# Patient Record
Sex: MALE | Race: WHITE | NOT HISPANIC OR LATINO | Employment: OTHER | URBAN - NONMETROPOLITAN AREA
[De-identification: names, ages, dates, MRNs, and addresses within clinical notes are randomized per-mention and may not be internally consistent; named-entity substitution may affect disease eponyms.]

---

## 2018-06-18 ENCOUNTER — TRANSCRIBE ORDERS (OUTPATIENT)
Dept: ADMINISTRATIVE | Facility: HOSPITAL | Age: 63
End: 2018-06-18

## 2018-06-18 ENCOUNTER — HOSPITAL ENCOUNTER (OUTPATIENT)
Dept: CT IMAGING | Facility: HOSPITAL | Age: 63
Discharge: HOME | End: 2018-06-18
Admitting: FAMILY MEDICINE

## 2018-06-18 DIAGNOSIS — N20.0 KIDNEY STONES: Primary | ICD-10-CM

## 2018-06-18 DIAGNOSIS — N20.0 KIDNEY STONES: ICD-10-CM

## 2018-06-18 PROCEDURE — 74176 CT ABD & PELVIS W/O CONTRAST: CPT

## 2023-05-02 ENCOUNTER — OFFICE VISIT (OUTPATIENT)
Dept: UROLOGY | Facility: CLINIC | Age: 68
End: 2023-05-02
Payer: MEDICARE

## 2023-05-02 VITALS
BODY MASS INDEX: 42.66 KG/M2 | SYSTOLIC BLOOD PRESSURE: 140 MMHG | HEIGHT: 72 IN | WEIGHT: 315 LBS | DIASTOLIC BLOOD PRESSURE: 86 MMHG

## 2023-05-02 DIAGNOSIS — R35.0 BENIGN PROSTATIC HYPERPLASIA WITH URINARY FREQUENCY: Primary | ICD-10-CM

## 2023-05-02 DIAGNOSIS — N40.1 BENIGN PROSTATIC HYPERPLASIA WITH URINARY FREQUENCY: Primary | ICD-10-CM

## 2023-05-02 LAB
BILIRUB BLD-MCNC: NEGATIVE MG/DL
CLARITY, POC: CLEAR
COLOR UR: ABNORMAL
EXPIRATION DATE: ABNORMAL
GLUCOSE UR STRIP-MCNC: ABNORMAL MG/DL
KETONES UR QL: NEGATIVE
LEUKOCYTE EST, POC: NEGATIVE
Lab: ABNORMAL
NITRITE UR-MCNC: NEGATIVE MG/ML
PH UR: 5.5 [PH] (ref 5–8)
PROT UR STRIP-MCNC: ABNORMAL MG/DL
RBC # UR STRIP: NEGATIVE /UL
SP GR UR: 1.02 (ref 1–1.03)
UROBILINOGEN UR QL: NORMAL

## 2023-05-02 RX ORDER — OFLOXACIN 3 MG/ML
SOLUTION/ DROPS OPHTHALMIC
COMMUNITY
Start: 2023-02-08

## 2023-05-02 RX ORDER — LISINOPRIL 20 MG/1
20 TABLET ORAL EVERY MORNING
COMMUNITY
Start: 2023-04-08

## 2023-05-02 RX ORDER — TAMSULOSIN HYDROCHLORIDE 0.4 MG/1
1 CAPSULE ORAL EVERY 12 HOURS SCHEDULED
COMMUNITY
Start: 2023-04-08

## 2023-05-02 RX ORDER — METHOCARBAMOL 500 MG/1
500 TABLET, FILM COATED ORAL
COMMUNITY
Start: 2022-11-17

## 2023-05-02 RX ORDER — PRAVASTATIN SODIUM 80 MG/1
80 TABLET ORAL
COMMUNITY
Start: 2023-04-08

## 2023-05-02 RX ORDER — CIPROFLOXACIN 500 MG/1
TABLET, FILM COATED ORAL
COMMUNITY
Start: 2023-04-13 | End: 2023-05-02

## 2023-05-02 RX ORDER — FINASTERIDE 5 MG/1
1 TABLET, FILM COATED ORAL DAILY
COMMUNITY
Start: 2023-04-08

## 2023-05-02 RX ORDER — VIBEGRON 75 MG/1
75 TABLET, FILM COATED ORAL DAILY
Qty: 90 TABLET | Refills: 3 | COMMUNITY
Start: 2023-05-02

## 2023-05-02 RX ORDER — FLUCONAZOLE 150 MG/1
TABLET ORAL
COMMUNITY
Start: 2023-04-13

## 2023-05-02 NOTE — PROGRESS NOTES
Office Visit Males LUTS      Patient Name: Dale Alas  : 1955   MRN: [id number]     Chief Complaint:   Chief Complaint   Patient presents with   • Urinary Urgency   • Urinary Incontinence     Frequent uti       Referring Provider: Guillermo Enciso*    History of Present Illness: Dale Alas is a 67 y.o. male who presents today with history of BPH who presents with dysuria frequent UTI and urinary incontinence.  Patient was treated for urinary tract infection on  UA was benign at that time patient's symptoms were back pain, burning with urination frequency and urgency.  He was given Rocephin and Cipro as well as methocarbamol and tamsulosin.  He he does report with the methocarbamol his back pain subsided and he is concerned his symptoms are related to his kidneys.  Patient had CT stone protocol in 2022 and in 2022 I do not have imaging but reports show no nephrolithiasis, hydronephrosis or ureteral stones.    Primary symptom includes: Urgency and frequency  Patient denies intermittency and straining  Onset was 6 to 7 months.  Patient desires conception in the Future no  Previous treatments include: Tamsulosin and finasteride    IPSS Questionnaire (AUA-7):  Incomplete emptying  Over the past month, how often have you had a sensation of not emptying your bladder completely after you finish?: Less than half the time (23)  Frequency  Over the past month, how often have you had to urinate again less than two hours after you finishing urinating ?: About half the time (23)  Intermittency  Over the past month, how often have you found you stopped and started again several time when you urinated ?: Not at all (23)  Urgency  Over the last month, how difficult  have you found it to postpone urination ?: more than half the time (23)  Weak Stream  Over the past month, how often have you had a weak urinary stream ?: Less than half the  time (05/02/23 1331)  Straining  Over the past month, how often have you had to push or strain to begin urination ?: Not at all (05/02/23 1331)  Nocturia  Over the past month, how many times did you most typically get up to urinate from the time you went to bed until the time you got up in the morning ?: Less than half the time (05/02/23 1331)  Quality of life due to urinary symptoms  If you were to spend the rest of your life with your urinary condition the way it is now, how would feel about that?: Mostly Satisfied (05/02/23 1331)    Scores  Total IPSS Score: 13 (05/02/23 1331)  Total Score = Symtomatic Level: moderately symptomatic: 8-19 (05/02/23 1331)       Subjective      Review of System:   As noted in HPI    Past Medical History: History reviewed. No pertinent past medical history.    Past Surgical History: History reviewed. No pertinent surgical history.    Family History: History reviewed. No pertinent family history.    Social History:   Social History     Socioeconomic History   • Marital status:        Medications:     Current Outpatient Medications:   •  methocarbamol (ROBAXIN) 500 MG tablet, Take 1 tablet by mouth., Disp: , Rfl:   •  carbidopa-levodopa (SINEMET)  MG per tablet, Take 1 tablet by mouth every night at bedtime., Disp: , Rfl:   •  finasteride (PROSCAR) 5 MG tablet, Take 1 tablet by mouth Daily., Disp: , Rfl:   •  fluconazole (DIFLUCAN) 150 MG tablet, , Disp: , Rfl:   •  lisinopril (PRINIVIL,ZESTRIL) 20 MG tablet, Take 1 tablet by mouth Every Morning., Disp: , Rfl:   •  metoprolol tartrate (LOPRESSOR) 25 MG tablet, Take 1 tablet by mouth Every 12 (Twelve) Hours., Disp: , Rfl:   •  ofloxacin (OCUFLOX) 0.3 % ophthalmic solution, INSTILL 1 DROP INTO EACH EYE 4 TIMES DAILY FOR 5 DAYS, Disp: , Rfl:   •  pravastatin (PRAVACHOL) 80 MG tablet, Take 1 tablet by mouth every night at bedtime., Disp: , Rfl:   •  tamsulosin (FLOMAX) 0.4 MG capsule 24 hr capsule, Take 1 capsule by mouth  "Every 12 (Twelve) Hours., Disp: , Rfl:     Allergies:   Allergies   Allergen Reactions   • Other Hives     \"Some kind of shot\" cannot recall name       Objective     Physical Exam:   Vital Signs:   Vitals:    05/02/23 1333   BP: 140/86   BP Location: Right arm   Patient Position: Sitting   Cuff Size: Adult   Weight: (!) 152 kg (336 lb)   Height: 182.9 cm (72\")     Body mass index is 45.57 kg/m².     Constitutional: NAD, WDWN.   Neurological: A + O x 3.    Skin: Pink, warm and dry.  No rashes noted.  Psych: Normal mood and affect     Genitourinary  Penis: circumcised penis, glans normal, no penile discharge.  No rashes/lesions.        Labs  No results found for: PSA    Brief Urine Lab Results  (Last result in the past 365 days)      Color   Clarity   Blood   Leuk Est   Nitrite   Protein   CREAT   Urine HCG        05/02/23 1336 Dark Yellow   Clear   Negative   Negative   Negative   Trace                 PVR  Post-void residual performed by staff - 0ml      Assessment / Plan      Assessment  Mr. Alas is a 67 y.o. male who presents with LUTS, primarily frequency urgency.  We reviewed previous CT reports from October and November 2022 neither show kidney stones or any abnormalities in the urinary tract.  It is unlikely his back pain is related to an underlying urologic cause I recommend he discuss back pain further with his PCP to rule out arthritis and spine causes versus muscular causes.  We are awaiting urine culture to be faxed over from PCPs office UA from visit on 4/13/2023 was benign and today UA is benign no concerns for UTI.  Patient does report mild dysuria we had an in-depth discussion about prostate symptoms and bladder health.  I recommend patient follow-up with BPH work-up to rule out prostate as an underlying cause of his urinary symptoms.  He is currently taking finasteride and tamsulosin we discussed surgical management of his prostate symptoms with UroLift versus TURP.  Patient is interested in surgical " management we also discussed many of his symptoms are OAB related symptoms we will do a trial of Gemtesa to see if this will improve urgency and frequency    Plan  1.  Follow up for cystoscopy, TRUS, Uroflow, HERNANDEZ, GE  2.  Start Gemtesa daily samples given and prescription sent      Follow Up:   Return for Dr. Ag cystoscopy, TRUS, and uroflo.    YOHANNES Bernal, NP-C  [facility]

## 2023-08-10 ENCOUNTER — PROCEDURE VISIT (OUTPATIENT)
Dept: UROLOGY | Facility: CLINIC | Age: 68
End: 2023-08-10
Payer: MEDICARE

## 2023-08-10 DIAGNOSIS — N39.41 URGE INCONTINENCE OF URINE: Primary | ICD-10-CM

## 2023-08-10 LAB
BILIRUB BLD-MCNC: NEGATIVE MG/DL
CLARITY, POC: CLEAR
COLOR UR: YELLOW
EXPIRATION DATE: ABNORMAL
GLUCOSE UR STRIP-MCNC: ABNORMAL MG/DL
KETONES UR QL: NEGATIVE
LEUKOCYTE EST, POC: NEGATIVE
Lab: ABNORMAL
NITRITE UR-MCNC: NEGATIVE MG/ML
PH UR: 6 [PH] (ref 5–8)
PROT UR STRIP-MCNC: NEGATIVE MG/DL
RBC # UR STRIP: NEGATIVE /UL
SP GR UR: 1.01 (ref 1–1.03)
UROBILINOGEN UR QL: NORMAL

## 2023-08-10 NOTE — PROGRESS NOTES
OPERATIVE NOTE:    DATE:  08/10/23    OPERATION PERFORMED:   Cystoscopy with bladder Botox (onabotulinumtoxin A) injection ( 100 units). NDC#8982-4529-86    PREOPERATIVE DIAGNOSES:   1. Refractory Overactive bladder  2. Urinary incontinence.     POSTOPERATIVE DIAGNOSES:   Same     SURGEON:   Alli Ag MD    ANESTHESIA: 30 cc of 2% viscous lidocaine, kept for 20 minutes before procedure.    SPECIMEN: none    BLOOD LOSS :  0 ml    COMPLICATIONS: none    INDICATION: Dale Alas is a 68 y.o. yrs old patient with Refractory Overactive bladder, who has failed multiple PO meds. After discussion, a decision was made to proceed with bladder botox injection.   The efficacy is close to 80% with 6% chance of retention of urine and need for self catheterization. Pt will usually need re-treatment in 6 months.    DESCRIPTION OF OPERATION:   Patient brought into the clinic procedure room, was placed in supine position. Llidocaine was placed in bladder with catheter and kept for 30 minutes before procedure. Pt was then converted to a dorsal lithotomy position. The genitalia prepped and draped in the usual sterile manner. Pt received PO antibiotics before the procedure.    We then placed a flexible cystoscope per urethra. The bladder was inspected. There were no lesions in the bladder or in the urethra. There were no tumors or calculi. There was no foreign body.     We reconstituted the Botox on a back table. 100 units were placed in 10 mL of saline to create a 10 unit/mL dilution. We used a bladder map, injecting the trigone, lateral wall, and posterior wall, injecting 1 mL into each site at a 5 mm depth. Botox was injected without any complications. We took care to avoid any significant vessels. There was no evidence of any bleeding or systemic absorption at the end of the procedure. The cystoscope was removed. The patient tolerated this procedure well.

## 2023-08-29 ENCOUNTER — OFFICE VISIT (OUTPATIENT)
Dept: UROLOGY | Facility: CLINIC | Age: 68
End: 2023-08-29
Payer: MEDICARE

## 2023-08-29 VITALS
BODY MASS INDEX: 42.66 KG/M2 | SYSTOLIC BLOOD PRESSURE: 140 MMHG | HEIGHT: 72 IN | WEIGHT: 315 LBS | DIASTOLIC BLOOD PRESSURE: 90 MMHG

## 2023-08-29 DIAGNOSIS — N39.41 URGE INCONTINENCE OF URINE: Primary | ICD-10-CM

## 2023-08-29 LAB
BILIRUB BLD-MCNC: NEGATIVE MG/DL
CLARITY, POC: CLEAR
COLOR UR: YELLOW
EXPIRATION DATE: ABNORMAL
GLUCOSE UR STRIP-MCNC: ABNORMAL MG/DL
KETONES UR QL: NEGATIVE
LEUKOCYTE EST, POC: NEGATIVE
Lab: ABNORMAL
NITRITE UR-MCNC: NEGATIVE MG/ML
PH UR: 5.5 [PH] (ref 5–8)
PROT UR STRIP-MCNC: NEGATIVE MG/DL
RBC # UR STRIP: NEGATIVE /UL
SP GR UR: 1.01 (ref 1–1.03)
UROBILINOGEN UR QL: NORMAL

## 2023-08-29 NOTE — PROGRESS NOTES
"       Office Visit Established Male Patient     Patient Name: Dale Alas  : 1955   MRN: [id number]     Chief Complaint:   Chief Complaint   Patient presents with    Follow-up     2 WEEK WITH PVR IPSS POST BOTOX         History of Present Illness: Mr. Dale Alas is a 68 y.o. male who presents today for follow up with bladder botox. He is doing well only gets up at night if he drinks before bed. He feels each day he is getting better and denies incontinence.        Subjective      Review of System:   As noted in HPI    Past Medical History: History reviewed. No pertinent past medical history.    Past Surgical History: History reviewed. No pertinent surgical history.    Family History: History reviewed. No pertinent family history.    Social History:   Social History     Socioeconomic History    Marital status:        Medications:     Current Outpatient Medications:     carbidopa-levodopa (SINEMET)  MG per tablet, Take 1 tablet by mouth every night at bedtime., Disp: , Rfl:     fluconazole (DIFLUCAN) 150 MG tablet, , Disp: , Rfl:     lisinopril (PRINIVIL,ZESTRIL) 20 MG tablet, Take 1 tablet by mouth Every Morning., Disp: , Rfl:     methocarbamol (ROBAXIN) 500 MG tablet, Take 1 tablet by mouth., Disp: , Rfl:     metoprolol tartrate (LOPRESSOR) 25 MG tablet, Take 1 tablet by mouth Every 12 (Twelve) Hours., Disp: , Rfl:     ofloxacin (OCUFLOX) 0.3 % ophthalmic solution, INSTILL 1 DROP INTO EACH EYE 4 TIMES DAILY FOR 5 DAYS, Disp: , Rfl:     pravastatin (PRAVACHOL) 80 MG tablet, Take 1 tablet by mouth every night at bedtime., Disp: , Rfl:     tamsulosin (FLOMAX) 0.4 MG capsule 24 hr capsule, Take 1 capsule by mouth Every 12 (Twelve) Hours., Disp: , Rfl:     Vibegron 75 MG tablet, Take 1 tablet by mouth Daily., Disp: 30 tablet, Rfl: 11    Allergies:   Allergies   Allergen Reactions    Other Hives     \"Some kind of shot\" cannot recall name       Objective     Physical Exam:   Vital Signs: " "  Vitals:    08/29/23 1438   BP: 140/90   BP Location: Left arm   Patient Position: Sitting   Cuff Size: Adult   Weight: (!) 152 kg (336 lb)   Height: 182.9 cm (72.01\")     Body mass index is 45.56 kg/mý.     Physical Exam  Constitutional: NAD, WDWN.   Neurological: A + O x 3.   Psychiatric:  Normal mood and affect    Labs  Brief Urine Lab Results  (Last result in the past 365 days)        Color   Clarity   Blood   Leuk Est   Nitrite   Protein   CREAT   Urine HCG        08/29/23 1444 Yellow   Clear   Negative   Negative   Negative   Negative                   No results found for: GLUCOSE, CALCIUM, NA, K, CO2, CL, BUN, CREATININE, EGFRIFAFRI, EGFRIFNONA, BCR, ANIONGAP    Lab Results   Component Value Date    WBC 6.81 11/17/2022    HGB 14.0 11/17/2022    HCT 43.0 11/17/2022    MCV 85 11/17/2022     11/17/2022            Radiographic Studies  No Images in the past 120 days found..    I have reviewed the above labs and imaging.     Assessment / Plan      Assessment/Plan:   Diagnoses and all orders for this visit:    1. Urge incontinence of urine (Primary)  -     POC Urinalysis Dipstick, Automated     67 yo make with refractory overactive bladder and urge incontinence s/p Bladder botox x 2 weeks today PVR is 0 and UA is benign. He is satisfied with results and wants to proceed with scheduling Botox again in 6 months.    Botox 6 months Dr. Ag    Follow Up:   Return in about 6 months (around 2/29/2024) for Dr. Ag cystoscopy with Botox .    YOHANNES Bernal,NP-C  [facility] [place] [place]   "

## 2024-01-31 ENCOUNTER — OFFICE VISIT (OUTPATIENT)
Dept: GASTROENTEROLOGY | Facility: CLINIC | Age: 69
End: 2024-01-31
Payer: MEDICARE

## 2024-01-31 VITALS
DIASTOLIC BLOOD PRESSURE: 80 MMHG | HEART RATE: 62 BPM | OXYGEN SATURATION: 98 % | SYSTOLIC BLOOD PRESSURE: 118 MMHG | BODY MASS INDEX: 42.66 KG/M2 | WEIGHT: 315 LBS | HEIGHT: 72 IN | RESPIRATION RATE: 18 BRPM

## 2024-01-31 DIAGNOSIS — Z12.11 ENCOUNTER FOR SCREENING FOR MALIGNANT NEOPLASM OF COLON: Primary | ICD-10-CM

## 2024-01-31 RX ORDER — TRIAMCINOLONE ACETONIDE 1 MG/G
CREAM TOPICAL
COMMUNITY
Start: 2023-11-24

## 2024-01-31 RX ORDER — METFORMIN HYDROCHLORIDE 500 MG/1
500 TABLET, EXTENDED RELEASE ORAL 2 TIMES DAILY WITH MEALS
COMMUNITY
Start: 2023-11-30

## 2024-01-31 RX ORDER — BISACODYL 5 MG/1
TABLET, DELAYED RELEASE ORAL
Qty: 4 TABLET | Refills: 0 | Status: SHIPPED | OUTPATIENT
Start: 2024-01-31

## 2024-01-31 RX ORDER — SODIUM CHLORIDE 9 MG/ML
70 INJECTION, SOLUTION INTRAVENOUS CONTINUOUS PRN
OUTPATIENT
Start: 2024-01-31

## 2024-01-31 RX ORDER — NIACIN 1000 MG/1
2000 TABLET, EXTENDED RELEASE ORAL
COMMUNITY
Start: 2023-12-02

## 2024-01-31 RX ORDER — FINASTERIDE 5 MG/1
1 TABLET, FILM COATED ORAL DAILY
COMMUNITY
Start: 2024-01-03

## 2024-01-31 NOTE — PATIENT INSTRUCTIONS
Colonoscopy: The indications, technique, alternatives and potential risk and complications were discussed with the patient including but not limited to bleeding, perforations, missing lesions and anesthetic complications. The patient understands and wishes to proceed with the procedure and has given their verbal consent. Written patient education information was given to the patient.   The patient will call if they have further questions before procedure.

## 2024-01-31 NOTE — PROGRESS NOTES
New Patient Consult      Date: 2024   Patient Name: Dale Alas  MRN: [id number]  : 1955     Primary Care Provider: Guillermo Enciso MD    Chief Complaint   Patient presents with    Colon Cancer Screening     History of Present Illness: Dale Alas is a 68 y.o. male who is here today to establish care with gastroenterology for colon cancer screening.     The patient denies recent change in bowel habits. There is no diarrhea or constipation. There is no history of abdominal pain. There is no history of overt GI bleed (hematemesis melena or hematochezia). The patient denies nausea or vomiting. There is no history of reflux. The patient denies dysphagia or odynophagia. There is no history of recent significant weight loss. There is no history of liver disease in the past. There is no family history of GI malignancy. The patient's last colonoscopy was by Dr. Coffey over 10 years ago that was normal per patient.     Subjective      Past Medical History:   Diagnosis Date    Arthritis     Hypercholesteremia      Past Surgical History:   Procedure Laterality Date    COLONOSCOPY      over 10 years ago    GALLBLADDER SURGERY N/A     REPLACEMENT TOTAL KNEE Left      Family History   Problem Relation Age of Onset    Prostate cancer Maternal Grandfather     Kidney cancer Maternal Grandfather     Cancer Son 34        [place] Sarcoma    Colon cancer Neg Hx      Social History     Socioeconomic History    Marital status:    Tobacco Use    Smoking status: Never   Substance and Sexual Activity    Alcohol use: Never    Drug use: Never       Current Outpatient Medications:     carbidopa-levodopa (SINEMET)  MG per tablet, Take 1 tablet by mouth every night at bedtime., Disp: , Rfl:     finasteride (PROSCAR) 5 MG tablet, Take 1 tablet by mouth Daily., Disp: , Rfl:     fluconazole (DIFLUCAN) 150 MG tablet, , Disp: , Rfl:     lisinopril (PRINIVIL,ZESTRIL) 20 MG tablet, Take 1 tablet by  "mouth Every Morning., Disp: , Rfl:     metFORMIN ER (GLUCOPHAGE-XR) 500 MG 24 hr tablet, Take 1 tablet by mouth 2 (Two) Times a Day With Meals., Disp: , Rfl:     niacin (NIASPAN) 1000 MG CR tablet, Take 2 tablets by mouth every night at bedtime., Disp: , Rfl:     ofloxacin (OCUFLOX) 0.3 % ophthalmic solution, INSTILL 1 DROP INTO EACH EYE 4 TIMES DAILY FOR 5 DAYS, Disp: , Rfl:     pravastatin (PRAVACHOL) 80 MG tablet, Take 1 tablet by mouth every night at bedtime., Disp: , Rfl:     triamcinolone (KENALOG) 0.1 % cream, APPLY CREAM EXTERNALLY TWICE DAILY TO AFFECTED AREA, Disp: , Rfl:     bisacodyl (DULCOLAX) 5 MG EC tablet, Take as directed for colon prep, Disp: 4 tablet, Rfl: 0    polyethylene glycol (GoLYTELY) 236 g solution, Take 4,000 mL by mouth 1 (One) Time for 1 dose. Use as directed for colonoscopy prep., Disp: 4000 mL, Rfl: 0    Vibegron 75 MG tablet, Take 1 tablet by mouth Daily. (Patient not taking: Reported on 1/31/2024), Disp: 30 tablet, Rfl: 11     Allergies   Allergen Reactions    Other Hives     \"Some kind of shot\" cannot recall name     The following portions of the patient's history were reviewed and updated as appropriate: allergies, current medications, past family history, past medical history, past social history, past surgical history and problem list.    Objective     Physical Exam  Vitals and nursing note reviewed.   Constitutional:       General: He is not in acute distress.     Appearance: Normal appearance. He is well-developed.   HENT:      Head: Normocephalic and atraumatic.      Mouth/Throat:      Mouth: Mucous membranes are not pale, not dry and not cyanotic.   Eyes:      General: Lids are normal.   Neck:      Trachea: Trachea normal.   Cardiovascular:      Rate and Rhythm: Normal rate.   Pulmonary:      Effort: Pulmonary effort is normal. No respiratory distress.      Breath sounds: Normal breath sounds.   Abdominal:      Tenderness: There is no abdominal tenderness.   Skin:     " "General: Skin is warm and dry.   Neurological:      Mental Status: He is alert and oriented to person, place, and time.   Psychiatric:         Mood and Affect: Mood normal.         Speech: Speech normal.         Behavior: Behavior normal. Behavior is cooperative.       Vitals:    01/31/24 1310   BP: 118/80   Pulse: 62   Resp: 18   SpO2: 98%   Weight: (!) 150 kg (330 lb)   Height: 182.9 cm (72\")     Body mass index is 44.76 kg/m².     Results Review:   I have reviewed the patient's new clinical and imaging results.    No visits with results within 90 Day(s) from this visit.   Latest known visit with results is:   Office Visit on 08/29/2023   Component Date Value Ref Range Status    Color 08/29/2023 Yellow  Yellow, Straw, Dark Yellow, Belkys Final    Clarity, UA 08/29/2023 Clear  Clear Final    Specific Gravity  08/29/2023 1.010  1.005 - 1.030 Final    pH, Urine 08/29/2023 5.5  5.0 - 8.0 Final    Leukocytes 08/29/2023 Negative  Negative Final    Nitrite, UA 08/29/2023 Negative  Negative Final    Protein, POC 08/29/2023 Negative  Negative mg/dL Final    Glucose, UA 08/29/2023 3+ (A)  Negative mg/dL Final    Ketones, UA 08/29/2023 Negative  Negative Final    Urobilinogen, UA 08/29/2023 Normal  Normal, 0.2 E.U./dL Final    Bilirubin 08/29/2023 Negative  Negative Final    Blood, UA 08/29/2023 Negative  Negative Final    Lot Number 08/29/2023 [id number]   Final    Expiration Date 08/29/2023 07/13/2024   Final      CT Abdomen Pelvis Without Contrast (10/24/2022 20:46)  CT Abdomen Pelvis Without Contrast (11/17/2022 15:30)    Assessment / Plan      1. Encounter for screening for malignant neoplasm of colon  The patient's last colonoscopy was by Dr. Coffey over 10 years ago that was normal per patient. No family history of colon cancer.   Colonoscopy for screening.     - Case Request  - bisacodyl (DULCOLAX) 5 MG EC tablet; Take as directed for colon prep  Dispense: 4 tablet; Refill: 0  - polyethylene glycol (GoLYTELY) 236 " g solution; Take 4,000 mL by mouth 1 (One) Time for 1 dose. Use as directed for colonoscopy prep.  Dispense: 4000 mL; Refill: 0    Patient Instructions   Colonoscopy: The indications, technique, alternatives and potential risk and complications were discussed with the patient including but not limited to bleeding, perforations, missing lesions and anesthetic complications. The patient understands and wishes to proceed with the procedure and has given their verbal consent. Written patient education information was given to the patient.   The patient will call if they have further questions before procedure.      Esteban Robert, APRN  1/31/2024    Please note that portions of this note may have been completed with a voice recognition program.

## 2024-02-06 PROBLEM — Z12.11 ENCOUNTER FOR SCREENING FOR MALIGNANT NEOPLASM OF COLON: Status: ACTIVE | Noted: 2024-01-31

## 2024-02-29 ENCOUNTER — PROCEDURE VISIT (OUTPATIENT)
Dept: UROLOGY | Facility: CLINIC | Age: 69
End: 2024-02-29
Payer: MEDICARE

## 2024-02-29 DIAGNOSIS — N39.41 URGE INCONTINENCE OF URINE: Primary | ICD-10-CM

## 2024-02-29 LAB
BILIRUB BLD-MCNC: NEGATIVE MG/DL
CLARITY, POC: CLEAR
COLOR UR: ABNORMAL
EXPIRATION DATE: ABNORMAL
GLUCOSE UR STRIP-MCNC: ABNORMAL MG/DL
KETONES UR QL: NEGATIVE
LEUKOCYTE EST, POC: NEGATIVE
Lab: ABNORMAL
NITRITE UR-MCNC: NEGATIVE MG/ML
PH UR: 5 [PH] (ref 5–8)
PROT UR STRIP-MCNC: NEGATIVE MG/DL
RBC # UR STRIP: NEGATIVE /UL
SP GR UR: 1.02 (ref 1–1.03)
UROBILINOGEN UR QL: NORMAL

## 2024-02-29 NOTE — PROGRESS NOTES
OPERATIVE NOTE:    DATE:  02/29/24    OPERATION PERFORMED:   Cystoscopy with bladder Botox (onabotulinumtoxin A) injection ( 100 units). NDC#5885-9630-42    PREOPERATIVE DIAGNOSES:   1. Refractory Overactive bladder  2. Urinary incontinence.     POSTOPERATIVE DIAGNOSES:   Same     SURGEON:   Alli Ag MD    ANESTHESIA: 30 cc of 2% viscous lidocaine, kept for 20 minutes before procedure.    SPECIMEN: none    BLOOD LOSS :  0 ml    COMPLICATIONS: none    INDICATION: Dale Alas is a 68 y.o. yrs old patient with Refractory Overactive bladder, who has failed multiple PO meds. After discussion, a decision was made to proceed with bladder botox injection.   The efficacy is close to 80% with 6% chance of retention of urine and need for self catheterization. Pt will usually need re-treatment in 6 months.    DESCRIPTION OF OPERATION:   Patient brought into the clinic procedure room, was placed in supine position. Llidocaine was placed in bladder with catheter and kept for 30 minutes before procedure. Pt was then converted to a dorsal lithotomy position. The genitalia prepped and draped in the usual sterile manner. Pt received PO antibiotics before the procedure.    We then placed a flexible cystoscope per urethra. The bladder was inspected. There were no lesions in the bladder or in the urethra. There were no tumors or calculi. There was no foreign body.     We reconstituted the Botox on a back table. 100 units were placed in 10 mL of saline to create a 10 unit/mL dilution. We used a bladder map, injecting the trigone, lateral wall, and posterior wall, injecting 1 mL into each site at a 5 mm depth. Botox was injected without any complications. We took care to avoid any significant vessels. There was no evidence of any bleeding or systemic absorption at the end of the procedure. The cystoscope was removed. The patient tolerated this procedure well.

## 2024-03-05 ENCOUNTER — TELEPHONE (OUTPATIENT)
Dept: GASTROENTEROLOGY | Facility: CLINIC | Age: 69
End: 2024-03-05
Payer: MEDICARE

## 2024-08-28 ENCOUNTER — TELEPHONE (OUTPATIENT)
Dept: UROLOGY | Facility: CLINIC | Age: 69
End: 2024-08-28
Payer: MEDICARE

## 2024-10-14 ENCOUNTER — PROCEDURE VISIT (OUTPATIENT)
Dept: UROLOGY | Facility: CLINIC | Age: 69
End: 2024-10-14
Payer: MEDICARE

## 2024-10-14 DIAGNOSIS — N39.41 URGE INCONTINENCE OF URINE: Primary | ICD-10-CM

## 2024-10-14 PROCEDURE — 52287 CYSTOSCOPY CHEMODENERVATION: CPT | Performed by: UROLOGY

## 2024-10-14 NOTE — PROGRESS NOTES
OPERATIVE NOTE:    DATE:  10/14/24    OPERATION PERFORMED:   Cystoscopy with bladder Botox (onabotulinumtoxin A) injection ( 100 units). NDC#[id number]    PREOPERATIVE DIAGNOSES:   1. Refractory Overactive bladder  2. Urinary incontinence.     POSTOPERATIVE DIAGNOSES:   Same     SURGEON:   Alli Ag MD    ANESTHESIA: 30 cc of 2% viscous lidocaine, kept for 20 minutes before procedure.    SPECIMEN: none    BLOOD LOSS :  0 ml    COMPLICATIONS: none    INDICATION: Dale Alas is a 69 y.o. yrs old patient with Refractory Overactive bladder, who has failed multiple PO meds. After discussion, a decision was made to proceed with bladder botox injection.   The efficacy is close to 80% with 6% chance of retention of urine and need for self catheterization. Pt will usually need re-treatment in 6 months.    DESCRIPTION OF OPERATION:   Patient brought into the clinic procedure room, was placed in supine position. Llidocaine was placed in bladder with catheter and kept for 30 minutes before procedure. Pt was then converted to a dorsal lithotomy position. The genitalia prepped and draped in the usual sterile manner. Pt received PO antibiotics before the procedure.    We then placed a flexible cystoscope per urethra. The bladder was inspected. There were no lesions in the bladder or in the urethra. There were no tumors or calculi. There was no foreign body.     We reconstituted the Botox on a back table. 100 units were placed in 10 mL of saline to create a 10 unit/mL dilution. We used a bladder map, injecting the trigone, lateral wall, and posterior wall, injecting 1 mL into each site at a 5 mm depth. Botox was injected without any complications. We took care to avoid any significant vessels. There was no evidence of any bleeding or systemic absorption at the end of the procedure. The cystoscope was removed. The patient tolerated this procedure well.

## 2025-04-14 ENCOUNTER — PROCEDURE VISIT (OUTPATIENT)
Dept: UROLOGY | Facility: CLINIC | Age: 70
End: 2025-04-14
Payer: MEDICARE

## 2025-04-14 DIAGNOSIS — N39.41 URGE INCONTINENCE OF URINE: Primary | ICD-10-CM

## 2025-04-14 LAB
BILIRUB BLD-MCNC: NEGATIVE MG/DL
CLARITY, POC: CLEAR
COLOR UR: YELLOW
EXPIRATION DATE: ABNORMAL
GLUCOSE UR STRIP-MCNC: ABNORMAL MG/DL
KETONES UR QL: NEGATIVE
LEUKOCYTE EST, POC: NEGATIVE
Lab: ABNORMAL
NITRITE UR-MCNC: NEGATIVE MG/ML
PH UR: 5.5 [PH] (ref 5–8)
PROT UR STRIP-MCNC: NEGATIVE MG/DL
RBC # UR STRIP: ABNORMAL /UL
SP GR UR: 1.03 (ref 1–1.03)
UROBILINOGEN UR QL: ABNORMAL

## 2025-04-14 PROCEDURE — 52287 CYSTOSCOPY CHEMODENERVATION: CPT | Performed by: UROLOGY

## 2025-04-14 PROCEDURE — 81003 URINALYSIS AUTO W/O SCOPE: CPT | Performed by: UROLOGY

## 2025-04-14 NOTE — PROGRESS NOTES
OPERATIVE NOTE:    DATE:  04/14/25    OPERATION PERFORMED:   Cystoscopy with bladder Botox (onabotulinumtoxin A) injection ( 100 units). NDC#9247-8734-70    PREOPERATIVE DIAGNOSES:   1. Refractory Overactive bladder  2. Urinary incontinence.     POSTOPERATIVE DIAGNOSES:   Same     SURGEON:   Alli Ag MD    ANESTHESIA: 30 cc of 2% viscous lidocaine, kept for 20 minutes before procedure.    SPECIMEN: none    BLOOD LOSS :  0 ml    COMPLICATIONS: none    INDICATION: Dale Alas is a 69 y.o. yrs old patient with Refractory Overactive bladder, who has failed multiple PO meds. After discussion, a decision was made to proceed with bladder botox injection.   The efficacy is close to 80% with 6% chance of retention of urine and need for self catheterization. Pt will usually need re-treatment in 6 months.    DESCRIPTION OF OPERATION:   Patient brought into the clinic procedure room, was placed in supine position. Llidocaine was placed in bladder with catheter and kept for 30 minutes before procedure. Pt was then converted to a dorsal lithotomy position. The genitalia prepped and draped in the usual sterile manner. Pt received PO antibiotics before the procedure.    We then placed a flexible cystoscope per urethra. The bladder was inspected. There were no lesions in the bladder or in the urethra. There were no tumors or calculi. There was no foreign body.     We reconstituted the Botox on a back table. 100 units were placed in 10 mL of saline to create a 10 unit/mL dilution. We used a bladder map, injecting the trigone, lateral wall, and posterior wall, injecting 1 mL into each site at a 5 mm depth. Botox was injected without any complications. We took care to avoid any significant vessels. There was no evidence of any bleeding or systemic absorption at the end of the procedure. The cystoscope was removed. The patient tolerated this procedure well.

## 2025-06-12 ENCOUNTER — APPOINTMENT (OUTPATIENT)
Dept: CARDIOLOGY | Facility: HOSPITAL | Age: 70
End: 2025-06-12
Payer: MEDICARE

## 2025-06-12 ENCOUNTER — HOSPITAL ENCOUNTER (INPATIENT)
Facility: HOSPITAL | Age: 70
LOS: 7 days | Discharge: HOME | End: 2025-06-19
Attending: FAMILY MEDICINE | Admitting: INTERNAL MEDICINE
Payer: MEDICARE

## 2025-06-12 DIAGNOSIS — E78.49 OTHER HYPERLIPIDEMIA: ICD-10-CM

## 2025-06-12 DIAGNOSIS — R73.03 PREDIABETES: ICD-10-CM

## 2025-06-12 DIAGNOSIS — Z12.11 ENCOUNTER FOR SCREENING FOR MALIGNANT NEOPLASM OF COLON: Primary | ICD-10-CM

## 2025-06-12 DIAGNOSIS — I10 PRIMARY HYPERTENSION: ICD-10-CM

## 2025-06-12 DIAGNOSIS — L03.116 CELLULITIS OF LEFT LOWER EXTREMITY: ICD-10-CM

## 2025-06-12 DIAGNOSIS — G25.81 RLS (RESTLESS LEGS SYNDROME): ICD-10-CM

## 2025-06-12 PROBLEM — I48.91 ATRIAL FIBRILLATION: Status: ACTIVE | Noted: 2025-06-12

## 2025-06-12 LAB
ALBUMIN SERPL-MCNC: 3.3 G/DL (ref 3.5–5.2)
ALBUMIN/GLOB SERPL: 1 G/DL
ALP SERPL-CCNC: 73 U/L (ref 39–117)
ALT SERPL W P-5'-P-CCNC: 16 U/L (ref 1–41)
ANION GAP SERPL CALCULATED.3IONS-SCNC: 10 MMOL/L (ref 5–15)
APTT PPP: 64 SECONDS (ref 60–90)
AST SERPL-CCNC: 21 U/L (ref 1–40)
BASOPHILS # BLD AUTO: 0.03 10*3/MM3 (ref 0–0.2)
BASOPHILS # BLD AUTO: 0.06 10*3/MM3 (ref 0–0.2)
BASOPHILS NFR BLD AUTO: 0.3 % (ref 0–1.5)
BASOPHILS NFR BLD AUTO: 0.6 % (ref 0–1.5)
BH CV LOW VAS LEFT GREATER SAPH AK VESSEL: 1
BH CV LOW VAS LEFT GREATER SAPH BK VESSEL: 1
BH CV LOW VAS RIGHT GREATER SAPH AK VESSEL: 1
BH CV LOW VAS RIGHT GREATER SAPH BK VESSEL: 1
BH CV LOWER VASCULAR LEFT COMMON FEMORAL AUGMENT: NORMAL
BH CV LOWER VASCULAR LEFT COMMON FEMORAL COMPETENT: NORMAL
BH CV LOWER VASCULAR LEFT COMMON FEMORAL COMPRESS: NORMAL
BH CV LOWER VASCULAR LEFT COMMON FEMORAL PHASIC: NORMAL
BH CV LOWER VASCULAR LEFT COMMON FEMORAL SPONT: NORMAL
BH CV LOWER VASCULAR LEFT DISTAL FEMORAL COMPRESS: NORMAL
BH CV LOWER VASCULAR LEFT GASTRONEMIUS COMPRESS: NORMAL
BH CV LOWER VASCULAR LEFT GREATER SAPH AK COMPRESS: NORMAL
BH CV LOWER VASCULAR LEFT GREATER SAPH BK COMPRESS: NORMAL
BH CV LOWER VASCULAR LEFT LESSER SAPH COMPRESS: NORMAL
BH CV LOWER VASCULAR LEFT MID FEMORAL AUGMENT: NORMAL
BH CV LOWER VASCULAR LEFT MID FEMORAL COMPETENT: NORMAL
BH CV LOWER VASCULAR LEFT MID FEMORAL COMPRESS: NORMAL
BH CV LOWER VASCULAR LEFT MID FEMORAL PHASIC: NORMAL
BH CV LOWER VASCULAR LEFT MID FEMORAL SPONT: NORMAL
BH CV LOWER VASCULAR LEFT PERONEAL COMPRESS: NORMAL
BH CV LOWER VASCULAR LEFT POPLITEAL AUGMENT: NORMAL
BH CV LOWER VASCULAR LEFT POPLITEAL COMPETENT: NORMAL
BH CV LOWER VASCULAR LEFT POPLITEAL COMPRESS: NORMAL
BH CV LOWER VASCULAR LEFT POPLITEAL PHASIC: NORMAL
BH CV LOWER VASCULAR LEFT POPLITEAL SPONT: NORMAL
BH CV LOWER VASCULAR LEFT POSTERIOR TIBIAL COMPRESS: NORMAL
BH CV LOWER VASCULAR LEFT PROFUNDA FEMORAL COMPRESS: NORMAL
BH CV LOWER VASCULAR LEFT PROXIMAL FEMORAL COMPRESS: NORMAL
BH CV LOWER VASCULAR LEFT SAPHENOFEMORAL JUNCTION COMPRESS: NORMAL
BH CV LOWER VASCULAR RIGHT COMMON FEMORAL AUGMENT: NORMAL
BH CV LOWER VASCULAR RIGHT COMMON FEMORAL COMPETENT: NORMAL
BH CV LOWER VASCULAR RIGHT COMMON FEMORAL COMPRESS: NORMAL
BH CV LOWER VASCULAR RIGHT COMMON FEMORAL PHASIC: NORMAL
BH CV LOWER VASCULAR RIGHT COMMON FEMORAL SPONT: NORMAL
BH CV LOWER VASCULAR RIGHT DISTAL FEMORAL COMPRESS: NORMAL
BH CV LOWER VASCULAR RIGHT GASTRONEMIUS COMPRESS: NORMAL
BH CV LOWER VASCULAR RIGHT GREATER SAPH AK COMPRESS: NORMAL
BH CV LOWER VASCULAR RIGHT GREATER SAPH BK COMPRESS: NORMAL
BH CV LOWER VASCULAR RIGHT LESSER SAPH COMPRESS: NORMAL
BH CV LOWER VASCULAR RIGHT MID FEMORAL AUGMENT: NORMAL
BH CV LOWER VASCULAR RIGHT MID FEMORAL COMPETENT: NORMAL
BH CV LOWER VASCULAR RIGHT MID FEMORAL COMPRESS: NORMAL
BH CV LOWER VASCULAR RIGHT MID FEMORAL PHASIC: NORMAL
BH CV LOWER VASCULAR RIGHT MID FEMORAL SPONT: NORMAL
BH CV LOWER VASCULAR RIGHT PERONEAL COMPRESS: NORMAL
BH CV LOWER VASCULAR RIGHT POPLITEAL AUGMENT: NORMAL
BH CV LOWER VASCULAR RIGHT POPLITEAL COMPETENT: NORMAL
BH CV LOWER VASCULAR RIGHT POPLITEAL COMPRESS: NORMAL
BH CV LOWER VASCULAR RIGHT POPLITEAL PHASIC: NORMAL
BH CV LOWER VASCULAR RIGHT POPLITEAL SPONT: NORMAL
BH CV LOWER VASCULAR RIGHT POSTERIOR TIBIAL COMPRESS: NORMAL
BH CV LOWER VASCULAR RIGHT PROFUNDA FEMORAL COMPRESS: NORMAL
BH CV LOWER VASCULAR RIGHT PROXIMAL FEMORAL COMPRESS: NORMAL
BH CV LOWER VASCULAR RIGHT SAPHENOFEMORAL JUNCTION COMPRESS: NORMAL
BH CV VAS PRELIMINARY FINDINGS SCRIPTING: 1
BILIRUB SERPL-MCNC: 0.3 MG/DL (ref 0–1.2)
BUN SERPL-MCNC: 11.1 MG/DL (ref 8–23)
BUN/CREAT SERPL: 13.5 (ref 7–25)
CALCIUM SPEC-SCNC: 8.2 MG/DL (ref 8.6–10.5)
CHLORIDE SERPL-SCNC: 102 MMOL/L (ref 98–107)
CO2 SERPL-SCNC: 23 MMOL/L (ref 22–29)
CREAT SERPL-MCNC: 0.82 MG/DL (ref 0.76–1.27)
CRP SERPL-MCNC: 6.09 MG/DL (ref 0–0.5)
DEPRECATED RDW RBC AUTO: 41.4 FL (ref 37–54)
DEPRECATED RDW RBC AUTO: 41.8 FL (ref 37–54)
EGFRCR SERPLBLD CKD-EPI 2021: 95.7 ML/MIN/1.73
EOSINOPHIL # BLD AUTO: 0.09 10*3/MM3 (ref 0–0.4)
EOSINOPHIL # BLD AUTO: 0.09 10*3/MM3 (ref 0–0.4)
EOSINOPHIL NFR BLD AUTO: 0.9 % (ref 0.3–6.2)
EOSINOPHIL NFR BLD AUTO: 0.9 % (ref 0.3–6.2)
ERYTHROCYTE [DISTWIDTH] IN BLOOD BY AUTOMATED COUNT: 13.4 % (ref 12.3–15.4)
ERYTHROCYTE [DISTWIDTH] IN BLOOD BY AUTOMATED COUNT: 13.5 % (ref 12.3–15.4)
GLOBULIN UR ELPH-MCNC: 3.2 GM/DL
GLUCOSE BLDC GLUCOMTR-MCNC: 265 MG/DL (ref 70–130)
GLUCOSE BLDC GLUCOMTR-MCNC: 277 MG/DL (ref 70–130)
GLUCOSE BLDC GLUCOMTR-MCNC: 294 MG/DL (ref 70–130)
GLUCOSE BLDC GLUCOMTR-MCNC: 321 MG/DL (ref 70–130)
GLUCOSE SERPL-MCNC: 295 MG/DL (ref 65–99)
HBA1C MFR BLD: 8.9 % (ref 4.8–5.6)
HCT VFR BLD AUTO: 35.7 % (ref 37.5–51)
HCT VFR BLD AUTO: 36.2 % (ref 37.5–51)
HGB BLD-MCNC: 11.9 G/DL (ref 13–17.7)
HGB BLD-MCNC: 11.9 G/DL (ref 13–17.7)
IMM GRANULOCYTES # BLD AUTO: 0.05 10*3/MM3 (ref 0–0.05)
IMM GRANULOCYTES # BLD AUTO: 0.05 10*3/MM3 (ref 0–0.05)
IMM GRANULOCYTES NFR BLD AUTO: 0.5 % (ref 0–0.5)
IMM GRANULOCYTES NFR BLD AUTO: 0.5 % (ref 0–0.5)
LYMPHOCYTES # BLD AUTO: 1.88 10*3/MM3 (ref 0.7–3.1)
LYMPHOCYTES # BLD AUTO: 1.89 10*3/MM3 (ref 0.7–3.1)
LYMPHOCYTES NFR BLD AUTO: 18.5 % (ref 19.6–45.3)
LYMPHOCYTES NFR BLD AUTO: 19 % (ref 19.6–45.3)
MCH RBC QN AUTO: 27.9 PG (ref 26.6–33)
MCH RBC QN AUTO: 27.9 PG (ref 26.6–33)
MCHC RBC AUTO-ENTMCNC: 32.9 G/DL (ref 31.5–35.7)
MCHC RBC AUTO-ENTMCNC: 33.3 G/DL (ref 31.5–35.7)
MCV RBC AUTO: 83.8 FL (ref 79–97)
MCV RBC AUTO: 84.8 FL (ref 79–97)
MONOCYTES # BLD AUTO: 0.63 10*3/MM3 (ref 0.1–0.9)
MONOCYTES # BLD AUTO: 0.74 10*3/MM3 (ref 0.1–0.9)
MONOCYTES NFR BLD AUTO: 6.4 % (ref 5–12)
MONOCYTES NFR BLD AUTO: 7.2 % (ref 5–12)
MRSA DNA SPEC QL NAA+PROBE: NEGATIVE
NEUTROPHILS NFR BLD AUTO: 7.16 10*3/MM3 (ref 1.7–7)
NEUTROPHILS NFR BLD AUTO: 7.44 10*3/MM3 (ref 1.7–7)
NEUTROPHILS NFR BLD AUTO: 72.6 % (ref 42.7–76)
NEUTROPHILS NFR BLD AUTO: 72.6 % (ref 42.7–76)
NRBC BLD AUTO-RTO: 0 /100 WBC (ref 0–0.2)
NRBC BLD AUTO-RTO: 0 /100 WBC (ref 0–0.2)
PLATELET # BLD AUTO: 182 10*3/MM3 (ref 140–450)
PLATELET # BLD AUTO: 190 10*3/MM3 (ref 140–450)
PMV BLD AUTO: 9.4 FL (ref 6–12)
PMV BLD AUTO: 9.8 FL (ref 6–12)
POTASSIUM SERPL-SCNC: 4 MMOL/L (ref 3.5–5.2)
PROCALCITONIN SERPL-MCNC: 0.71 NG/ML (ref 0–0.25)
PROT SERPL-MCNC: 6.5 G/DL (ref 6–8.5)
RBC # BLD AUTO: 4.26 10*6/MM3 (ref 4.14–5.8)
RBC # BLD AUTO: 4.27 10*6/MM3 (ref 4.14–5.8)
SODIUM SERPL-SCNC: 135 MMOL/L (ref 136–145)
UFH PPP CHRO-ACNC: 0.57 IU/ML (ref 0.3–0.7)
WBC NRBC COR # BLD AUTO: 10.24 10*3/MM3 (ref 3.4–10.8)
WBC NRBC COR # BLD AUTO: 9.87 10*3/MM3 (ref 3.4–10.8)

## 2025-06-12 PROCEDURE — 84145 PROCALCITONIN (PCT): CPT | Performed by: HOSPITALIST

## 2025-06-12 PROCEDURE — 80053 COMPREHEN METABOLIC PANEL: CPT | Performed by: HOSPITALIST

## 2025-06-12 PROCEDURE — 85025 COMPLETE CBC W/AUTO DIFF WBC: CPT | Performed by: HOSPITALIST

## 2025-06-12 PROCEDURE — 25010000002 VANCOMYCIN 10 G RECONSTITUTED SOLUTION

## 2025-06-12 PROCEDURE — 82948 REAGENT STRIP/BLOOD GLUCOSE: CPT

## 2025-06-12 PROCEDURE — 87641 MR-STAPH DNA AMP PROBE: CPT | Performed by: HOSPITALIST

## 2025-06-12 PROCEDURE — 63710000001 INSULIN LISPRO (HUMAN) PER 5 UNITS: Performed by: HOSPITALIST

## 2025-06-12 PROCEDURE — 25810000003 SODIUM CHLORIDE 0.9 % SOLUTION

## 2025-06-12 PROCEDURE — 83036 HEMOGLOBIN GLYCOSYLATED A1C: CPT | Performed by: HOSPITALIST

## 2025-06-12 PROCEDURE — 25010000002 CEFTRIAXONE PER 250 MG: Performed by: HOSPITALIST

## 2025-06-12 PROCEDURE — 25010000002 MIDAZOLAM PER 1 MG: Performed by: INTERNAL MEDICINE

## 2025-06-12 PROCEDURE — 85730 THROMBOPLASTIN TIME PARTIAL: CPT | Performed by: HOSPITALIST

## 2025-06-12 PROCEDURE — 93970 EXTREMITY STUDY: CPT

## 2025-06-12 PROCEDURE — 86140 C-REACTIVE PROTEIN: CPT | Performed by: HOSPITALIST

## 2025-06-12 PROCEDURE — 99223 1ST HOSP IP/OBS HIGH 75: CPT | Performed by: HOSPITALIST

## 2025-06-12 PROCEDURE — 25010000002 HEPARIN (PORCINE) 25000-0.45 UT/250ML-% SOLUTION

## 2025-06-12 PROCEDURE — 85520 HEPARIN ASSAY: CPT

## 2025-06-12 PROCEDURE — 87040 BLOOD CULTURE FOR BACTERIA: CPT | Performed by: HOSPITALIST

## 2025-06-12 PROCEDURE — 99222 1ST HOSP IP/OBS MODERATE 55: CPT | Performed by: INTERNAL MEDICINE

## 2025-06-12 PROCEDURE — 93970 EXTREMITY STUDY: CPT | Performed by: INTERNAL MEDICINE

## 2025-06-12 RX ORDER — FOLIC ACID 1 MG/1
1 TABLET ORAL DAILY
COMMUNITY

## 2025-06-12 RX ORDER — BISACODYL 10 MG
10 SUPPOSITORY, RECTAL RECTAL DAILY PRN
Status: DISCONTINUED | OUTPATIENT
Start: 2025-06-12 | End: 2025-06-19 | Stop reason: HOSPADM

## 2025-06-12 RX ORDER — AMOXICILLIN 250 MG
2 CAPSULE ORAL 2 TIMES DAILY PRN
Status: DISCONTINUED | OUTPATIENT
Start: 2025-06-12 | End: 2025-06-19 | Stop reason: HOSPADM

## 2025-06-12 RX ORDER — NICOTINE POLACRILEX 4 MG
15 LOZENGE BUCCAL
Status: DISCONTINUED | OUTPATIENT
Start: 2025-06-12 | End: 2025-06-13

## 2025-06-12 RX ORDER — MIDAZOLAM HYDROCHLORIDE 1 MG/ML
INJECTION, SOLUTION INTRAMUSCULAR; INTRAVENOUS
Status: COMPLETED | OUTPATIENT
Start: 2025-06-12 | End: 2025-06-12

## 2025-06-12 RX ORDER — FENTANYL CITRATE 50 UG/ML
50-200 INJECTION, SOLUTION INTRAMUSCULAR; INTRAVENOUS AS NEEDED
Refills: 0 | Status: DISCONTINUED | OUTPATIENT
Start: 2025-06-12 | End: 2025-06-16

## 2025-06-12 RX ORDER — TAMSULOSIN HYDROCHLORIDE 0.4 MG/1
0.4 CAPSULE ORAL DAILY
Status: DISCONTINUED | OUTPATIENT
Start: 2025-06-12 | End: 2025-06-19 | Stop reason: HOSPADM

## 2025-06-12 RX ORDER — DILTIAZEM HCL/D5W 125 MG/125
5-15 PLASTIC BAG, INJECTION (ML) INTRAVENOUS
Status: DISCONTINUED | OUTPATIENT
Start: 2025-06-13 | End: 2025-06-12

## 2025-06-12 RX ORDER — LISINOPRIL 20 MG/1
20 TABLET ORAL EVERY MORNING
Status: DISCONTINUED | OUTPATIENT
Start: 2025-06-12 | End: 2025-06-13

## 2025-06-12 RX ORDER — BISACODYL 5 MG/1
5 TABLET, DELAYED RELEASE ORAL DAILY PRN
Status: DISCONTINUED | OUTPATIENT
Start: 2025-06-12 | End: 2025-06-19 | Stop reason: HOSPADM

## 2025-06-12 RX ORDER — CARBIDOPA AND LEVODOPA 25; 100 MG/1; MG/1
1 TABLET ORAL NIGHTLY
Status: DISCONTINUED | OUTPATIENT
Start: 2025-06-12 | End: 2025-06-19 | Stop reason: HOSPADM

## 2025-06-12 RX ORDER — METOPROLOL TARTRATE 25 MG/1
25 TABLET, FILM COATED ORAL 2 TIMES DAILY
COMMUNITY
End: 2025-06-19 | Stop reason: HOSPADM

## 2025-06-12 RX ORDER — HEPARIN SODIUM 10000 [USP'U]/100ML
17 INJECTION, SOLUTION INTRAVENOUS
Status: DISCONTINUED | OUTPATIENT
Start: 2025-06-12 | End: 2025-06-12

## 2025-06-12 RX ORDER — PRAVASTATIN SODIUM 40 MG
80 TABLET ORAL DAILY
Status: DISCONTINUED | OUTPATIENT
Start: 2025-06-12 | End: 2025-06-19 | Stop reason: HOSPADM

## 2025-06-12 RX ORDER — SODIUM CHLORIDE 9 MG/ML
40 INJECTION, SOLUTION INTRAVENOUS AS NEEDED
Status: DISCONTINUED | OUTPATIENT
Start: 2025-06-12 | End: 2025-06-19 | Stop reason: HOSPADM

## 2025-06-12 RX ORDER — NITROGLYCERIN 0.4 MG/1
0.4 TABLET SUBLINGUAL
Status: DISCONTINUED | OUTPATIENT
Start: 2025-06-12 | End: 2025-06-19 | Stop reason: HOSPADM

## 2025-06-12 RX ORDER — METOPROLOL TARTRATE 25 MG/1
25 TABLET, FILM COATED ORAL 2 TIMES DAILY
Status: DISCONTINUED | OUTPATIENT
Start: 2025-06-12 | End: 2025-06-12

## 2025-06-12 RX ORDER — IBUPROFEN 600 MG/1
1 TABLET ORAL
Status: DISCONTINUED | OUTPATIENT
Start: 2025-06-12 | End: 2025-06-13

## 2025-06-12 RX ORDER — ASPIRIN 81 MG/1
81 TABLET ORAL DAILY
Status: DISCONTINUED | OUTPATIENT
Start: 2025-06-12 | End: 2025-06-19 | Stop reason: HOSPADM

## 2025-06-12 RX ORDER — MELOXICAM 15 MG/1
15 TABLET ORAL DAILY
COMMUNITY
End: 2025-06-19 | Stop reason: HOSPADM

## 2025-06-12 RX ORDER — INSULIN LISPRO 100 [IU]/ML
2-7 INJECTION, SOLUTION INTRAVENOUS; SUBCUTANEOUS
Status: DISCONTINUED | OUTPATIENT
Start: 2025-06-12 | End: 2025-06-13

## 2025-06-12 RX ORDER — MIDAZOLAM HYDROCHLORIDE 1 MG/ML
2-20 INJECTION, SOLUTION INTRAMUSCULAR; INTRAVENOUS AS NEEDED
Status: DISCONTINUED | OUTPATIENT
Start: 2025-06-12 | End: 2025-06-16

## 2025-06-12 RX ORDER — NALOXONE HCL 0.4 MG/ML
0.4 VIAL (ML) INJECTION ONCE AS NEEDED
Status: DISCONTINUED | OUTPATIENT
Start: 2025-06-12 | End: 2025-06-16

## 2025-06-12 RX ORDER — HEPARIN SODIUM 1000 [USP'U]/ML
4000 INJECTION, SOLUTION INTRAVENOUS; SUBCUTANEOUS AS NEEDED
Status: DISCONTINUED | OUTPATIENT
Start: 2025-06-12 | End: 2025-06-12

## 2025-06-12 RX ORDER — HEPARIN SODIUM 10000 [USP'U]/100ML
1000 INJECTION, SOLUTION INTRAVENOUS
Status: DISCONTINUED | OUTPATIENT
Start: 2025-06-12 | End: 2025-06-12

## 2025-06-12 RX ORDER — ONDANSETRON 2 MG/ML
4 INJECTION INTRAMUSCULAR; INTRAVENOUS EVERY 6 HOURS PRN
Status: DISCONTINUED | OUTPATIENT
Start: 2025-06-12 | End: 2025-06-19 | Stop reason: HOSPADM

## 2025-06-12 RX ORDER — POLYETHYLENE GLYCOL 3350 17 G/17G
17 POWDER, FOR SOLUTION ORAL DAILY PRN
Status: DISCONTINUED | OUTPATIENT
Start: 2025-06-12 | End: 2025-06-19 | Stop reason: HOSPADM

## 2025-06-12 RX ORDER — ETOMIDATE 2 MG/ML
INJECTION INTRAVENOUS
Status: COMPLETED | OUTPATIENT
Start: 2025-06-12 | End: 2025-06-12

## 2025-06-12 RX ORDER — TAMSULOSIN HYDROCHLORIDE 0.4 MG/1
1 CAPSULE ORAL DAILY
COMMUNITY

## 2025-06-12 RX ORDER — DILTIAZEM HCL/D5W 125 MG/125
5-15 PLASTIC BAG, INJECTION (ML) INTRAVENOUS
Status: DISCONTINUED | OUTPATIENT
Start: 2025-06-12 | End: 2025-06-19

## 2025-06-12 RX ORDER — SODIUM CHLORIDE 0.9 % (FLUSH) 0.9 %
10 SYRINGE (ML) INJECTION EVERY 12 HOURS SCHEDULED
Status: DISCONTINUED | OUTPATIENT
Start: 2025-06-12 | End: 2025-06-19 | Stop reason: HOSPADM

## 2025-06-12 RX ORDER — ACETAMINOPHEN 500 MG
1000 TABLET ORAL 3 TIMES DAILY
Status: COMPLETED | OUTPATIENT
Start: 2025-06-12 | End: 2025-06-13

## 2025-06-12 RX ORDER — SODIUM CHLORIDE 0.9 % (FLUSH) 0.9 %
10 SYRINGE (ML) INJECTION AS NEEDED
Status: DISCONTINUED | OUTPATIENT
Start: 2025-06-12 | End: 2025-06-19 | Stop reason: HOSPADM

## 2025-06-12 RX ORDER — HEPARIN SODIUM 1000 [USP'U]/ML
2000 INJECTION, SOLUTION INTRAVENOUS; SUBCUTANEOUS AS NEEDED
Status: DISCONTINUED | OUTPATIENT
Start: 2025-06-12 | End: 2025-06-12

## 2025-06-12 RX ORDER — METOPROLOL TARTRATE 25 MG/1
50 TABLET, FILM COATED ORAL 2 TIMES DAILY
Status: DISCONTINUED | OUTPATIENT
Start: 2025-06-12 | End: 2025-06-13

## 2025-06-12 RX ORDER — DEXTROSE MONOHYDRATE 25 G/50ML
25 INJECTION, SOLUTION INTRAVENOUS
Status: DISCONTINUED | OUTPATIENT
Start: 2025-06-12 | End: 2025-06-13

## 2025-06-12 RX ORDER — ETOMIDATE 2 MG/ML
0.1 INJECTION INTRAVENOUS AS NEEDED
Status: DISCONTINUED | OUTPATIENT
Start: 2025-06-12 | End: 2025-06-16

## 2025-06-12 RX ORDER — ETOMIDATE 2 MG/ML
0.05 INJECTION INTRAVENOUS AS NEEDED
Status: DISCONTINUED | OUTPATIENT
Start: 2025-06-12 | End: 2025-06-16

## 2025-06-12 RX ORDER — FINASTERIDE 5 MG/1
5 TABLET, FILM COATED ORAL DAILY
Status: DISCONTINUED | OUTPATIENT
Start: 2025-06-12 | End: 2025-06-19 | Stop reason: HOSPADM

## 2025-06-12 RX ORDER — FLUMAZENIL 0.1 MG/ML
0.5 INJECTION INTRAVENOUS ONCE AS NEEDED
Status: DISCONTINUED | OUTPATIENT
Start: 2025-06-12 | End: 2025-06-16

## 2025-06-12 RX ORDER — ONDANSETRON 4 MG/1
4 TABLET, ORALLY DISINTEGRATING ORAL EVERY 6 HOURS PRN
Status: DISCONTINUED | OUTPATIENT
Start: 2025-06-12 | End: 2025-06-19 | Stop reason: HOSPADM

## 2025-06-12 RX ORDER — FOLIC ACID 1 MG/1
1 TABLET ORAL DAILY
Status: DISCONTINUED | OUTPATIENT
Start: 2025-06-12 | End: 2025-06-19 | Stop reason: HOSPADM

## 2025-06-12 RX ADMIN — ASPIRIN 81 MG: 81 TABLET, COATED ORAL at 08:00

## 2025-06-12 RX ADMIN — Medication 10 MG/HR: at 07:58

## 2025-06-12 RX ADMIN — FOLIC ACID 1 MG: 1 TABLET ORAL at 08:00

## 2025-06-12 RX ADMIN — CARBIDOPA AND LEVODOPA 1 TABLET: 25; 100 TABLET ORAL at 08:25

## 2025-06-12 RX ADMIN — MIDAZOLAM HYDROCHLORIDE 2 MG: 1 INJECTION, SOLUTION INTRAMUSCULAR; INTRAVENOUS at 14:44

## 2025-06-12 RX ADMIN — INSULIN LISPRO 5 UNITS: 100 INJECTION, SOLUTION INTRAVENOUS; SUBCUTANEOUS at 07:58

## 2025-06-12 RX ADMIN — APIXABAN 5 MG: 5 TABLET, FILM COATED ORAL at 21:14

## 2025-06-12 RX ADMIN — ACETAMINOPHEN 1000 MG: 500 TABLET ORAL at 16:48

## 2025-06-12 RX ADMIN — MIDAZOLAM HYDROCHLORIDE 2 MG: 1 INJECTION, SOLUTION INTRAMUSCULAR; INTRAVENOUS at 14:38

## 2025-06-12 RX ADMIN — SODIUM CHLORIDE 2000 MG: 900 INJECTION INTRAVENOUS at 08:15

## 2025-06-12 RX ADMIN — APIXABAN 5 MG: 5 TABLET, FILM COATED ORAL at 09:58

## 2025-06-12 RX ADMIN — ETOMIDATE 10 MG: 40 INJECTION, SOLUTION INTRAVENOUS at 14:38

## 2025-06-12 RX ADMIN — METOPROLOL TARTRATE 25 MG: 25 TABLET, FILM COATED ORAL at 08:00

## 2025-06-12 RX ADMIN — ACETAMINOPHEN 1000 MG: 500 TABLET ORAL at 08:00

## 2025-06-12 RX ADMIN — LISINOPRIL 20 MG: 20 TABLET ORAL at 08:00

## 2025-06-12 RX ADMIN — ETOMIDATE 10 MG: 40 INJECTION, SOLUTION INTRAVENOUS at 14:42

## 2025-06-12 RX ADMIN — PRAVASTATIN SODIUM 80 MG: 40 TABLET ORAL at 08:00

## 2025-06-12 RX ADMIN — METOPROLOL TARTRATE 50 MG: 25 TABLET, FILM COATED ORAL at 21:14

## 2025-06-12 RX ADMIN — INSULIN LISPRO 4 UNITS: 100 INJECTION, SOLUTION INTRAVENOUS; SUBCUTANEOUS at 16:47

## 2025-06-12 RX ADMIN — VANCOMYCIN HYDROCHLORIDE 3000 MG: 10 INJECTION, POWDER, LYOPHILIZED, FOR SOLUTION INTRAVENOUS at 09:58

## 2025-06-12 RX ADMIN — TAMSULOSIN HYDROCHLORIDE 0.4 MG: 0.4 CAPSULE ORAL at 08:00

## 2025-06-12 RX ADMIN — HEPARIN SODIUM 17 UNITS/KG/HR: 10000 INJECTION, SOLUTION INTRAVENOUS at 09:11

## 2025-06-12 RX ADMIN — FINASTERIDE 5 MG: 5 TABLET, FILM COATED ORAL at 08:00

## 2025-06-12 RX ADMIN — ACETAMINOPHEN 1000 MG: 500 TABLET ORAL at 21:14

## 2025-06-12 RX ADMIN — INSULIN LISPRO 4 UNITS: 100 INJECTION, SOLUTION INTRAVENOUS; SUBCUTANEOUS at 21:00

## 2025-06-12 NOTE — PROGRESS NOTES
Procedure report:  The patient was sedated with 4 mg intravenous Versed and 20 mg of etomidate.  Multiple attempts at advancing the HELLEN probe were unsuccessful due to short stout neck and significant resistance felt.  Due to high risk of trauma the procedure was canceled.  Recommend rate control and anticoagulation for atrial fibrillation.  Can be considered for ECV after 6 weeks of anticoagulation or if earlier ECV is felt indicated patient should have EGD guided placement of transesophageal echocardiogram probe.  Discussed with Dr. Montano.    Terry Camacho MD, FACC, [facility]

## 2025-06-12 NOTE — H&P
"    [facility] Medicine Services  HISTORY AND PHYSICAL    Patient Name: Dale Alas  : 1955  MRN: [id number]  Primary Care Physician: Guillermo Enciso MD  Date of admission: 2025      Subjective   Subjective     Chief Complaint: Dyspnea/weakness    HPI:  Dale Alas is a 69 y.o. male with past medical history significant for prediabetes, HTN, HL, OA, MAREN, RLS, overactive bladder s/p botox, here with weakness. Noted chills/sweats 6- overnight with weakness/SOA following that. Felt tired/SOA/weak through -. Felt better then recurrent SOA  and weakness, noting no strength in his LE for 1-2 days prompting ED visit at [facility]. Found to have atrial fibrillation with RVR there. Notably, progressive BLE swelling/edema, markedly worse in LLE. Redness noted. Has noted \"scraping\" his leg last week.     Otherwise, denies fevers. Cough/congestion noted, but white sputum. No chest pain. No PND/orthopnea. Denies palpitations. Denies overt dizziness. No n/v/diarrhea. No blood in stool/urine. No dysphagia/dysarthria. No numbness, tingling, no focal weakness.       Personal History     Past Medical History:   Diagnosis Date    Arthritis     Diabetes     Hypercholesteremia     MI (myocardial infarction)     Sleep apnea     CPAP    Wears glasses            Past Surgical History:   Procedure Laterality Date    COLONOSCOPY      over 10 years ago    GALLBLADDER SURGERY N/A 2015    REPLACEMENT TOTAL KNEE Left        Family History: family history includes Cancer (age of onset: 34) in his son; Kidney cancer in his maternal grandfather; Prostate cancer in his maternal grandfather.     Social History:  reports that he has quit smoking. His smoking use included cigarettes. He has a 1 pack-year smoking history. He has never used smokeless tobacco. He reports that he does not drink alcohol and does not use drugs.  Social History     Social History Narrative    Not on file " "      Medications:  Available home medication information reviewed.  Cyanocobalamin, Insulin Glargine (2 Unit Dial), Melatonin, Tart Cherry, Tirzepatide, aspirin, carbidopa-levodopa, finasteride, folic acid, lisinopril, meloxicam, metFORMIN ER, metoprolol tartrate, niacin, pravastatin, and tamsulosin    Allergies   Allergen Reactions    Other Hives     \"Some kind of shot\" cannot recall name       Objective   Objective     Vital Signs:   Temp:  [97.6 °F (36.4 °C)] 97.6 °F (36.4 °C)  BP: (145)/(84) 145/84       Physical Exam   NAD, alert and oriented  OP clear, dry MM  Neck supple  No LAD  Tachy  Decreased at bases, distant BS  +BS, soft  Morbidly obese  B LE edema, but LLE markedly edematous, erythematous, and warm, notably tibial below knee to ankle. TTP.   ALEXIS  Normal affect  Speech clear, face symmetric    Result Review:  I have personally reviewed the results from the time of this admission to 6/12/2025 07:08 EDT and agree with these findings:  []  Laboratory list / accordion  []  Microbiology  []  Radiology  []  EKG/Telemetry   []  Cardiology/Vascular   []  Pathology  []  Old records  []  Other:  Most notable findings include: EKG atrial fibrillation with RVR, CTA chest with no PE noted, no thoracic aneurysm, no infiltrates/PTX. Calcified R pleural plaque, chronic appearing R 5th rib fracture, hepatomegaly    WBC 7.5  Hgb 13.1  Platelets 190    Creatinine 1.09    LA 2.2      LAB RESULTS:      Lab 06/11/25  2342   PROTIME 9.4   INR 0.96         Lab 06/11/25  2342   MAGNESIUM 1.7*                         UA          4/14/2025    13:01 6/12/2025    01:57   Urinalysis   Squamous Epithelial Cells, UA  0-2       Ketones, UA Negative     Leukocytes, UA Negative     RBC, UA  None Seen       WBC, UA  None Seen          Details          This result is from an external source.               Microbiology Results (last 10 days)       ** No results found for the last 240 hours. **            No radiology results from the last " 24 hrs        Assessment & Plan   Assessment & Plan       Atrial fibrillation    Primary hypertension    Prediabetes    Other hyperlipidemia    RLS (restless legs syndrome)    Cellulitis of left lower extremity    Dyspnea  New onset atrial fibrillation  -no PE on CTA  -on heparin  -ECHO pending  -repeat EKG  -cardiology consult    LLE cellulitis  -cannot rule out DVT, duplex pending  -rocephin/vancomycin  -notably with anterior tibial lacerations  -check MRSA PCR  -blood cultures    Prediabetes  -check A1C  -SSI    RLS  -continue home sinement    HTN/HL  -resume appropriate home meds    MAREN  -CPAP    VTE Prophylaxis:  Mechanical VTE prophylaxis orders are present.          CODE STATUS:    Code Status and Medical Interventions: CPR (Attempt to Resuscitate); Full Support   Ordered at: 06/12/25 0654     Code Status (Patient has no pulse and is not breathing):    CPR (Attempt to Resuscitate)     Medical Interventions (Patient has pulse or is breathing):    Full Support       Expected Discharge   Expected discharge date/ time has not been documented.     Andres Zavaleta MD  06/12/25

## 2025-06-12 NOTE — PLAN OF CARE
Goal Outcome Evaluation:  Plan of Care Reviewed With: patient        Progress: no change  Outcome Evaluation: just arrived                              no

## 2025-06-12 NOTE — PLAN OF CARE
Goal Outcome Evaluation:  Plan of Care Reviewed With: patient        Progress: no change  Outcome Evaluation: -VSS, RA, A&Ox4. Pt was unable to complete HELLEN today. Heparin D/c and started on eliquis this morning. Continue Cardizem gtt at 10       Problem: Adult Inpatient Plan of Care  Goal: Plan of Care Review  Outcome: Progressing  Flowsheets (Taken 6/12/2025 1813)  Progress: no change  Outcome Evaluation: -VSS, RA, A&Ox4. Pt was unable to complete HELLEN today. Heparin D/c and started on eliquis this morning. Continue Cardizem gtt at 10  Plan of Care Reviewed With: patient  Goal: Patient-Specific Goal (Individualized)  Outcome: Progressing  Goal: Absence of Hospital-Acquired Illness or Injury  Outcome: Progressing  Intervention: Identify and Manage Fall Risk  Recent Flowsheet Documentation  Taken 6/12/2025 1400 by Yesi Arboleda RN  Safety Promotion/Fall Prevention: patient off unit  Taken 6/12/2025 1200 by Yesi Arboleda RN  Safety Promotion/Fall Prevention:   activity supervised   assistive device/personal items within reach   clutter free environment maintained   fall prevention program maintained   safety round/check completed  Taken 6/12/2025 1000 by Yesi Arboleda RN  Safety Promotion/Fall Prevention:   activity supervised   assistive device/personal items within reach   clutter free environment maintained   fall prevention program maintained   safety round/check completed  Taken 6/12/2025 0800 by Yesi Arboleda RN  Safety Promotion/Fall Prevention:   assistive device/personal items within reach   activity supervised   clutter free environment maintained   fall prevention program maintained   safety round/check completed  Intervention: Prevent Skin Injury  Recent Flowsheet Documentation  Taken 6/12/2025 1200 by Yesi Arboleda RN  Body Position: side-lying  Taken 6/12/2025 0800 by Yesi Arboleda RN  Body Position: position changed independently  Intervention: Prevent and Manage VTE (Venous  Thromboembolism) Risk  Recent Flowsheet Documentation  Taken 6/12/2025 0800 by Yesi Arboleda RN  VTE Prevention/Management: (See MAR) other (see comments)  Goal: Optimal Comfort and Wellbeing  Outcome: Progressing  Goal: Readiness for Transition of Care  Outcome: Progressing     Problem: Comorbidity Management  Goal: Blood Glucose Level Within Target Range  Outcome: Progressing  Intervention: Monitor and Manage Glycemia  Recent Flowsheet Documentation  Taken 6/12/2025 0800 by Yesi Arboleda RN  Medication Review/Management: medications reviewed  Goal: Blood Pressure in Desired Range  Outcome: Progressing  Intervention: Maintain Blood Pressure Management  Recent Flowsheet Documentation  Taken 6/12/2025 0800 by Yesi Arboleda RN  Medication Review/Management: medications reviewed

## 2025-06-12 NOTE — CASE MANAGEMENT/SOCIAL WORK
Discharge Planning Assessment  [facility]     Patient Name: Dale Alas  MRN: [id number]  Today's Date: 6/12/2025    Admit Date: 6/12/2025    Plan: home   Discharge Needs Assessment       Row Name 06/12/25 0918       Living Environment    People in Home child(elva), adult    Current Living Arrangements home    Primary Care Provided by self       Transition Planning    Patient/Family Anticipates Transition to home with family       Discharge Needs Assessment    Readmission Within the Last 30 Days no previous admission in last 30 days    Equipment Currently Used at Home cpap;walker, rolling    Concerns to be Addressed basic needs;discharge planning                   Discharge Plan       Row Name 06/12/25 0919       Plan    Plan home    Patient/Family in Agreement with Plan yes    Plan Comments I met with this patient bedside. He lives with his son in [place]. He is independent with activities of daily living and mobility with the use of a rolling walker. He does use a cpap at home. PT and OT have been consulted. He anticipates returning home after this hospitalization, and his son can transport. Case management will follow.    Final Discharge Disposition Code 01 - home or self-care                       Demographic Summary       Row Name 06/12/25 0917       General Information    General Information Comments I confinrmed that Guillermo Enciso is Mr Alas's PCP and he has Medicare AB and [employer]                   Functional Status       Row Name 06/12/25 0918       Functional Status, IADL    Medications independent    Meal Preparation independent    Housekeeping independent    Laundry independent    Shopping independent                   Psychosocial    No documentation.                  Abuse/Neglect    No documentation.                  Legal    No documentation.                  Substance Abuse    No documentation.                  Patient Forms    No documentation.                     Jeanette  DEBBI Langley

## 2025-06-12 NOTE — NURSING NOTE
Woc consulted for left lower extremity cellulitis.     There is a superficial scab on the lower left shin but does not look infected. The hole lower leg is swollen, tight, red and hot to touch.                       There is a purple are around the lateral knee.     Pt. Has knee hardware and a plate in the shin.     I recommend imaging as well as possible ortho/ infectious disease consult.     No wounds so woc will sign off, reconsult if needed.

## 2025-06-12 NOTE — CONSULTS
"[facility] at [facility]  Cardiovascular Consultation Note    Reason for consultation: A-fib RVR    History of Present Illness:  69-year-old male with prediabetes, hypertension, hyperlipidemia, RLS, MAREN who recently had symptoms of fever and chills.  He states he felt better on Monday.  But then on Tuesday he began to feel fatigued and have increased lower extremity edema and some shortness of breath and he presented to [facility] for evaluation.  There the patient was found to be in A-fib RVR.  The patient states he was at  about 6 or 7 years ago and \"fluid around his heart\".  States he had a cardiac cath which showed no coronary artery disease and normal myocardial function.  The patient does not feel his heart racing.  He states he felt well up until over the weekend when he developed symptoms.  Prior to that he had no problems with shortness of breath or exertional chest pain.  He also has noted significant edema of his left lower extremity and erythema consistent with cellulitis    Cardiac risk factors: #1 male sex #2 age greater than 55 #3 hypertension #4 hyperlipidemia #5 prediabetes    Past medical and surgical history, social and family history reviewed in EMR.    REVIEW OF SYSTEMS:     Past Medical History:   Diagnosis Date    Arthritis     Diabetes     Hypercholesteremia     MI (myocardial infarction) 2015    Sleep apnea     CPAP    Wears glasses      Past Surgical History:   Procedure Laterality Date    COLONOSCOPY      over 10 years ago    GALLBLADDER SURGERY N/A 2015    REPLACEMENT TOTAL KNEE Left 2013     Social History     Socioeconomic History    Marital status:    Tobacco Use    Smoking status: Former     Current packs/day: 1.00     Average packs/day: 1 pack/day for 1 year (1.0 ttl pk-yrs)     Types: Cigarettes    Smokeless tobacco: Never   Vaping Use    Vaping status: Never Used   Substance and Sexual Activity    Alcohol use: Never    Drug use: Never    " "Sexual activity: Defer     Family History   Problem Relation Age of Onset    Prostate cancer Maternal Grandfather     Kidney cancer Maternal Grandfather     Cancer Son 34        Montano Sarcoma    Colon cancer Neg Hx        H&P ROS reviewed and pertinent CV ROS as noted in HPI.    Cardiac: Patient complains of shortness of breath, weakness and increased lower extremity edema found to be in A-fib  Respiratory: Prior to the onset of these symptoms he had no problem dyspnea on exertion.  He has sleep apnea and faithfully wears a CPAP.  Has noted some shortness of breath  Lower Extremities: History of left-sided lower extremity edema after he had his knee surgery.  He hit his leg had a scrape and now has significant erythema  GI: No nausea vomiting diarrhea bright red blood per rectum or melena  Neuro: No history of stroke TIA or neurologic event  Hematology: No bleeding diathesis ecchymosis or petechiae  Renal: No evidence of renal disease  Musculoskeletal: Has osteoarthritis  Endocrine: Has prediabetes he takes Mounjaro and Glucophage  Constitutional: Had recent fever and chills around June 6 through 7  Psych: No luisana depression or suicide ideation      Physical Exam: General Very pleasant overweight male in bed heart rate 102 nondyspneic nontachypneic       HEENT: No JVP.  No bruits.  Dentition good.  No icterus       Respiratory: Equal bilateral symmetrical expansion\" bilaterally       Cardiovascular: Irregular rate and rhythm without any obvious murmur.  Trace edema right lower extremity significant edema with erythema of the left lower extremity       GI: Obese and soft       Lower Extremities: Lesion noted on the left leg with diffuse significant erythema up to the level of the knee       Neuro: Facial expression symmetric moves all 4 extremities       Skin: Warm and dry with significant erythema and edema of the left lower extremity       Psych: Pleasant affect oriented x 3    Results Review: Review of telemetry " shows A-fib RVR.  Hemoglobin is 11.9.  CRP is elevated 6.09.  Potassium 4.0.  GFR is 95.  Also has obesity           Vital Sign Min/Max for last 24 hours  Temp  Min: 97.6 °F (36.4 °C)  Max: 97.6 °F (36.4 °C)   BP  Min: 145/84  Max: 145/84   No data recorded   No data recorded   SpO2  Min: 97 %  Max: 97 %   No data recorded      Intake/Output Summary (Last 24 hours) at 6/12/2025 0851  Last data filed at 6/12/2025 0700  Gross per 24 hour   Intake --   Output 200 ml   Net -200 ml             Current Facility-Administered Medications:     acetaminophen (TYLENOL) tablet 1,000 mg, 1,000 mg, Oral, TID, Andres Zavaleta MD, 1,000 mg at 06/12/25 0800    aspirin EC tablet 81 mg, 81 mg, Oral, Daily, Andres Zavaleta MD, 81 mg at 06/12/25 0800    sennosides-docusate (PERICOLACE) 8.6-50 MG per tablet 2 tablet, 2 tablet, Oral, BID PRN **AND** polyethylene glycol (MIRALAX) packet 17 g, 17 g, Oral, Daily PRN **AND** bisacodyl (DULCOLAX) EC tablet 5 mg, 5 mg, Oral, Daily PRN **AND** bisacodyl (DULCOLAX) suppository 10 mg, 10 mg, Rectal, Daily PRN, Andres Zavaleta MD    Calcium Replacement - Follow Nurse / BPA Driven Protocol, , Not Applicable, PRN, Andres Zavaleta MD    carbidopa-levodopa (SINEMET)  MG per tablet 1 tablet, 1 tablet, Oral, Nightly, Andres Zavaleta MD, 1 tablet at 06/12/25 0825    cefTRIAXone (ROCEPHIN) 2,000 mg in sodium chloride 0.9 % 100 mL MBP, 2,000 mg, Intravenous, Q24H, Andres Zavaleta MD, Last Rate: 200 mL/hr at 06/12/25 0815, 2,000 mg at 06/12/25 0815    dextrose (D50W) (25 g/50 mL) IV injection 25 g, 25 g, Intravenous, Q15 Min PRN, Andres Zavaleta MD    dextrose (GLUTOSE) oral gel 15 g, 15 g, Oral, Q15 Min PRN, Andres Zavaleta MD    dilTIAZem (CARDIZEM) 125 mg in 125 mL D5W infusion, 5-15 mg/hr, Intravenous, Titrated, Andres Zavaleta MD, Last Rate: 10 mL/hr at 06/12/25 0758, 10 mg/hr at 06/12/25 0758    finasteride (PROSCAR) tablet 5 mg, 5 mg, Oral, Daily, Andres Zavaleta MD, 5 mg at 06/12/25  0800    folic acid (FOLVITE) tablet 1 mg, 1 mg, Oral, Daily, Andres Zavaleta MD, 1 mg at 06/12/25 0800    glucagon (GLUCAGEN) injection 1 mg, 1 mg, Intramuscular, Q15 Min PRN, Andres Zavaleta MD    heparin 61042 units/250 mL (100 units/mL) in 0.45 % NaCl infusion, 17 Units/kg/hr, Intravenous, Titrated, Meg Brown, RPH    Insulin Lispro (humaLOG) injection 2-7 Units, 2-7 Units, Subcutaneous, 4x Daily AC & at Bedtime, Andres Zavaleta MD, 5 Units at 06/12/25 0758    lisinopril (PRINIVIL,ZESTRIL) tablet 20 mg, 20 mg, Oral, QAM, Andres Zavaleta MD, 20 mg at 06/12/25 0800    Magnesium Standard Dose Replacement - Follow Nurse / BPA Driven Protocol, , Not Applicable, PRN, Andres Zavaleta MD    metoprolol tartrate (LOPRESSOR) tablet 25 mg, 25 mg, Oral, BID, Andres Zavaleta MD, 25 mg at 06/12/25 0800    nitroglycerin (NITROSTAT) SL tablet 0.4 mg, 0.4 mg, Sublingual, Q5 Min PRN, Andres Zavaleta MD    ondansetron ODT (ZOFRAN-ODT) disintegrating tablet 4 mg, 4 mg, Oral, Q6H PRN **OR** ondansetron (ZOFRAN) injection 4 mg, 4 mg, Intravenous, Q6H PRN, Andres Zavaleta MD    Pharmacy to Dose Heparin, , Not Applicable, Continuous PRNWaqar Marc P, MD    Pharmacy to dose vancomycin, , Not Applicable, Continuous PRWaqar NOONAN Marc P, MD    Phosphorus Replacement - Follow Nurse / BPA Driven Protocol, , Not Applicable, PRWaqar NOONAN Marc P, MD    Potassium Replacement - Follow Nurse / BPA Driven Protocol, , Not Applicable, Waqar CASTILLO Marc P, MD    pravastatin (PRAVACHOL) tablet 80 mg, 80 mg, Oral, Daily, Andres Zavaleta MD, 80 mg at 06/12/25 0800    sodium chloride 0.9 % flush 10 mL, 10 mL, Intravenous, Q12H, Andres Zavaleta MD    sodium chloride 0.9 % flush 10 mL, 10 mL, Intravenous, PRN, Andres Zavaleta MD    sodium chloride 0.9 % infusion 40 mL, 40 mL, Intravenous, PRN, Andres Zavaleta MD    tamsulosin (FLOMAX) 24 hr capsule 0.4 mg, 0.4 mg, Oral, Daily, Andres Zavaleta MD, 0.4 mg at 06/12/25 0800    vancomycin  3000 mg/500 mL 0.9% NS IVPB ([facility]), 3,000 mg, Intravenous, Once, Meg Brown, [facility]    Lab Review:   Results from last 7 days   Lab Units 06/12/25  0744   WBC 10*3/mm3 9.87   HEMOGLOBIN g/dL 11.9*   PLATELETS 10*3/mm3 182     Results from last 7 days   Lab Units 06/12/25  0744 06/11/25  2342   SODIUM mmol/L 135*  --    POTASSIUM mmol/L 4.0  --    CO2 mmol/L 23.0  --    BUN mg/dL 11.1  --    CREATININE mg/dL 0.82  --    MAGNESIUM mg/dL  --  1.7*   GLUCOSE mg/dL 295*  --      CrCl cannot be calculated (Unknown ideal weight.).        .lipd  Lab Results   Component Value Date    AST 21 06/12/2025    ALT 16 06/12/2025       Radiology Reports:  Imaging Results (Last 72 Hours)       ** No results found for the last 72 hours. **            Assessment/Plan: 1 patient presents complaining of shortness of breath weakness and edema especially of the left lower extremity.  His symptoms have been present for at least 48 hours.  His rate is better on diltiazem drip.  I recommend the patient undergo HELLEN cardioversion.  Will try to get patient on the schedule today or tomorrow.  He will need Eliquis and probable titration of his beta-blocker  2 patient has significant erythema and cellulitis of the left lower extremity.  He is on antibiotics.  He is currently given venous duplex to rule out DVT.  Patient has a history of negative cardiac cath 6 or 7 years ago        Zen Montano MD  06/12/25  08:51 EDT

## 2025-06-12 NOTE — PROGRESS NOTES
Pharmacy to Dose Heparin Infusion Note    Dale Alas is a 69 y.o. male receiving heparin infusion.     Therapy for (VTE/Cardiac): Cardiac (Atrial fibrillation)  Patient Weight: 149 kg  Initial Bolus (Y/N): No  Any Bolus (Y/N): Yes     Signs or Symptoms of Bleeding: BEATRICE RN - None at present.    Cardiac or Other (Not VTE)  Anti-Xa Bolus   Dose Infusion Hold   Time Infusion Rate Change (units/kg/hr) Repeat  Anti-Xa    < 0.11 50 units/kg  (4000 units Max) None Increase by  3 units/kg/hr 6 hours   0.11- 0.19 25 units/kg  (2000 units Max) None Increase by  2 units/kg/hr 6 hours   0.2 - 0.29 0 None Increase by  1 units/kg/hr 6 hours   0.3 - 0.5 0 None No Change 6 hours (after 2 consecutive levels in range check qAM)   0.51 - 0.6 0 None Decrease by  1 units/kg/hr 6 hours   0.61 - 0.8 0 30 minutes Decrease by  2 units/kg/hr 6 hours   0.81 - 1 0 60 minutes Decrease by  3 units/kg/hr 6 hours   >1 0 Hold  After Anti-Xa less than 0.5 decrease previous rate by  4 units/kg/hr  Every 2 hours until Anti-Xa  less than 0.5 then when infusion restarts in 6 hours     Results from last 7 days   Lab Units 06/12/25  0744 06/11/25  2342   INR   --  0.96   HEMOGLOBIN g/dL 11.9*  --    HEMATOCRIT % 35.7*  --    PLATELETS 10*3/mm3 182  --        Date   Time   Anti-Xa Current Rate (units/kg/hr) Bolus   (units) Rate Change   (units/kg/hr) New Rate (units/kg/hr) Repeat  Anti-Xa Comments /  Pump Check    6/12 0744 0.57 18 -- -1 17 1500 BEATRICE RN Yesi; Pt arrived to  [place] w/ heparin infusing at 18 units/kg/hr and no s/sx of bleeding. Will decrease by 1  unit/kg/hr based on protocol.                                                                                                                                                                                                                                  Meg Brown, [facility]  6/12/2025  08:33 EDT

## 2025-06-13 ENCOUNTER — APPOINTMENT (OUTPATIENT)
Dept: GENERAL RADIOLOGY | Facility: HOSPITAL | Age: 70
End: 2025-06-13
Payer: MEDICARE

## 2025-06-13 ENCOUNTER — APPOINTMENT (OUTPATIENT)
Dept: CT IMAGING | Facility: HOSPITAL | Age: 70
End: 2025-06-13
Payer: MEDICARE

## 2025-06-13 LAB
ANION GAP SERPL CALCULATED.3IONS-SCNC: 8 MMOL/L (ref 5–15)
BASOPHILS # BLD AUTO: 0.03 10*3/MM3 (ref 0–0.2)
BASOPHILS NFR BLD AUTO: 0.3 % (ref 0–1.5)
BUN SERPL-MCNC: 16.6 MG/DL (ref 8–23)
BUN/CREAT SERPL: 15.5 (ref 7–25)
CALCIUM SPEC-SCNC: 8.6 MG/DL (ref 8.6–10.5)
CHLORIDE SERPL-SCNC: 102 MMOL/L (ref 98–107)
CO2 SERPL-SCNC: 24 MMOL/L (ref 22–29)
CREAT SERPL-MCNC: 1.07 MG/DL (ref 0.76–1.27)
DEPRECATED RDW RBC AUTO: 42.2 FL (ref 37–54)
EGFRCR SERPLBLD CKD-EPI 2021: 75.6 ML/MIN/1.73
EOSINOPHIL # BLD AUTO: 0.09 10*3/MM3 (ref 0–0.4)
EOSINOPHIL NFR BLD AUTO: 1 % (ref 0.3–6.2)
ERYTHROCYTE [DISTWIDTH] IN BLOOD BY AUTOMATED COUNT: 13.4 % (ref 12.3–15.4)
GLUCOSE BLDC GLUCOMTR-MCNC: 204 MG/DL (ref 70–130)
GLUCOSE BLDC GLUCOMTR-MCNC: 271 MG/DL (ref 70–130)
GLUCOSE BLDC GLUCOMTR-MCNC: 286 MG/DL (ref 70–130)
GLUCOSE BLDC GLUCOMTR-MCNC: 394 MG/DL (ref 70–130)
GLUCOSE SERPL-MCNC: 346 MG/DL (ref 65–99)
HCT VFR BLD AUTO: 35.5 % (ref 37.5–51)
HGB BLD-MCNC: 11.4 G/DL (ref 13–17.7)
IMM GRANULOCYTES # BLD AUTO: 0.05 10*3/MM3 (ref 0–0.05)
IMM GRANULOCYTES NFR BLD AUTO: 0.5 % (ref 0–0.5)
LYMPHOCYTES # BLD AUTO: 1.59 10*3/MM3 (ref 0.7–3.1)
LYMPHOCYTES NFR BLD AUTO: 16.9 % (ref 19.6–45.3)
MCH RBC QN AUTO: 27.5 PG (ref 26.6–33)
MCHC RBC AUTO-ENTMCNC: 32.1 G/DL (ref 31.5–35.7)
MCV RBC AUTO: 85.5 FL (ref 79–97)
MONOCYTES # BLD AUTO: 0.57 10*3/MM3 (ref 0.1–0.9)
MONOCYTES NFR BLD AUTO: 6.1 % (ref 5–12)
NEUTROPHILS NFR BLD AUTO: 7.07 10*3/MM3 (ref 1.7–7)
NEUTROPHILS NFR BLD AUTO: 75.2 % (ref 42.7–76)
NRBC BLD AUTO-RTO: 0 /100 WBC (ref 0–0.2)
PLATELET # BLD AUTO: 192 10*3/MM3 (ref 140–450)
PMV BLD AUTO: 9.4 FL (ref 6–12)
POTASSIUM SERPL-SCNC: 4.3 MMOL/L (ref 3.5–5.2)
RBC # BLD AUTO: 4.15 10*6/MM3 (ref 4.14–5.8)
SODIUM SERPL-SCNC: 134 MMOL/L (ref 136–145)
WBC NRBC COR # BLD AUTO: 9.4 10*3/MM3 (ref 3.4–10.8)

## 2025-06-13 PROCEDURE — 97162 PT EVAL MOD COMPLEX 30 MIN: CPT

## 2025-06-13 PROCEDURE — 97530 THERAPEUTIC ACTIVITIES: CPT

## 2025-06-13 PROCEDURE — 82948 REAGENT STRIP/BLOOD GLUCOSE: CPT

## 2025-06-13 PROCEDURE — 25010000002 CEFTRIAXONE PER 250 MG: Performed by: HOSPITALIST

## 2025-06-13 PROCEDURE — 25010000002 DIGOXIN PER 500 MCG: Performed by: INTERNAL MEDICINE

## 2025-06-13 PROCEDURE — 63710000001 INSULIN GLARGINE PER 5 UNITS: Performed by: HOSPITALIST

## 2025-06-13 PROCEDURE — 99232 SBSQ HOSP IP/OBS MODERATE 35: CPT | Performed by: HOSPITALIST

## 2025-06-13 PROCEDURE — 87205 SMEAR GRAM STAIN: CPT | Performed by: INTERNAL MEDICINE

## 2025-06-13 PROCEDURE — 80048 BASIC METABOLIC PNL TOTAL CA: CPT | Performed by: HOSPITALIST

## 2025-06-13 PROCEDURE — 63710000001 INSULIN LISPRO (HUMAN) PER 5 UNITS: Performed by: HOSPITALIST

## 2025-06-13 PROCEDURE — 25010000002 DAPTOMYCIN PER 1 MG: Performed by: INTERNAL MEDICINE

## 2025-06-13 PROCEDURE — 73560 X-RAY EXAM OF KNEE 1 OR 2: CPT

## 2025-06-13 PROCEDURE — 85025 COMPLETE CBC W/AUTO DIFF WBC: CPT | Performed by: HOSPITALIST

## 2025-06-13 PROCEDURE — 73700 CT LOWER EXTREMITY W/O DYE: CPT

## 2025-06-13 PROCEDURE — 97166 OT EVAL MOD COMPLEX 45 MIN: CPT

## 2025-06-13 PROCEDURE — 99232 SBSQ HOSP IP/OBS MODERATE 35: CPT | Performed by: INTERNAL MEDICINE

## 2025-06-13 PROCEDURE — 25010000002 CEFAZOLIN PER 500 MG: Performed by: INTERNAL MEDICINE

## 2025-06-13 PROCEDURE — 87070 CULTURE OTHR SPECIMN AEROBIC: CPT | Performed by: INTERNAL MEDICINE

## 2025-06-13 RX ORDER — IBUPROFEN 600 MG/1
1 TABLET ORAL
Status: DISCONTINUED | OUTPATIENT
Start: 2025-06-13 | End: 2025-06-19 | Stop reason: HOSPADM

## 2025-06-13 RX ORDER — METOPROLOL TARTRATE 100 MG/1
100 TABLET ORAL 2 TIMES DAILY
Qty: 60 TABLET | Refills: 3 | Status: CANCELLED | OUTPATIENT
Start: 2025-06-13

## 2025-06-13 RX ORDER — METOPROLOL TARTRATE 100 MG/1
100 TABLET ORAL 2 TIMES DAILY
Status: DISCONTINUED | OUTPATIENT
Start: 2025-06-13 | End: 2025-06-19 | Stop reason: HOSPADM

## 2025-06-13 RX ORDER — DIGOXIN 0.25 MG/ML
250 INJECTION INTRAMUSCULAR; INTRAVENOUS EVERY 4 HOURS
Status: COMPLETED | OUTPATIENT
Start: 2025-06-13 | End: 2025-06-13

## 2025-06-13 RX ORDER — DEXTROSE MONOHYDRATE 25 G/50ML
25 INJECTION, SOLUTION INTRAVENOUS
Status: DISCONTINUED | OUTPATIENT
Start: 2025-06-13 | End: 2025-06-19 | Stop reason: HOSPADM

## 2025-06-13 RX ORDER — INSULIN LISPRO 100 [IU]/ML
2-9 INJECTION, SOLUTION INTRAVENOUS; SUBCUTANEOUS
Status: DISCONTINUED | OUTPATIENT
Start: 2025-06-13 | End: 2025-06-19 | Stop reason: HOSPADM

## 2025-06-13 RX ORDER — NICOTINE POLACRILEX 4 MG
15 LOZENGE BUCCAL
Status: DISCONTINUED | OUTPATIENT
Start: 2025-06-13 | End: 2025-06-19 | Stop reason: HOSPADM

## 2025-06-13 RX ADMIN — INSULIN LISPRO 4 UNITS: 100 INJECTION, SOLUTION INTRAVENOUS; SUBCUTANEOUS at 17:14

## 2025-06-13 RX ADMIN — ACETAMINOPHEN 1000 MG: 500 TABLET ORAL at 15:37

## 2025-06-13 RX ADMIN — PRAVASTATIN SODIUM 80 MG: 40 TABLET ORAL at 08:01

## 2025-06-13 RX ADMIN — LISINOPRIL 20 MG: 20 TABLET ORAL at 08:00

## 2025-06-13 RX ADMIN — DIGOXIN 250 MCG: 0.25 INJECTION INTRAMUSCULAR; INTRAVENOUS at 09:38

## 2025-06-13 RX ADMIN — FOLIC ACID 1 MG: 1 TABLET ORAL at 08:01

## 2025-06-13 RX ADMIN — DIGOXIN 250 MCG: 0.25 INJECTION INTRAMUSCULAR; INTRAVENOUS at 21:35

## 2025-06-13 RX ADMIN — DIGOXIN 250 MCG: 0.25 INJECTION INTRAMUSCULAR; INTRAVENOUS at 17:14

## 2025-06-13 RX ADMIN — Medication 10 MG/HR: at 00:09

## 2025-06-13 RX ADMIN — SODIUM CHLORIDE 2000 MG: 900 INJECTION INTRAVENOUS at 08:00

## 2025-06-13 RX ADMIN — DIGOXIN 250 MCG: 0.25 INJECTION INTRAMUSCULAR; INTRAVENOUS at 12:38

## 2025-06-13 RX ADMIN — DAPTOMYCIN 450 MG: 500 INJECTION, POWDER, LYOPHILIZED, FOR SOLUTION INTRAVENOUS at 15:37

## 2025-06-13 RX ADMIN — TAMSULOSIN HYDROCHLORIDE 0.4 MG: 0.4 CAPSULE ORAL at 08:00

## 2025-06-13 RX ADMIN — INSULIN LISPRO 8 UNITS: 100 INJECTION, SOLUTION INTRAVENOUS; SUBCUTANEOUS at 12:39

## 2025-06-13 RX ADMIN — INSULIN LISPRO 6 UNITS: 100 INJECTION, SOLUTION INTRAVENOUS; SUBCUTANEOUS at 21:36

## 2025-06-13 RX ADMIN — METOPROLOL TARTRATE 100 MG: 100 TABLET, FILM COATED ORAL at 21:35

## 2025-06-13 RX ADMIN — INSULIN GLARGINE 15 UNITS: 100 INJECTION, SOLUTION SUBCUTANEOUS at 09:39

## 2025-06-13 RX ADMIN — FINASTERIDE 5 MG: 5 TABLET, FILM COATED ORAL at 08:01

## 2025-06-13 RX ADMIN — ACETAMINOPHEN 1000 MG: 500 TABLET ORAL at 21:35

## 2025-06-13 RX ADMIN — APIXABAN 5 MG: 5 TABLET, FILM COATED ORAL at 08:01

## 2025-06-13 RX ADMIN — INSULIN LISPRO 4 UNITS: 100 INJECTION, SOLUTION INTRAVENOUS; SUBCUTANEOUS at 08:00

## 2025-06-13 RX ADMIN — APIXABAN 5 MG: 5 TABLET, FILM COATED ORAL at 21:35

## 2025-06-13 RX ADMIN — CARBIDOPA AND LEVODOPA 1 TABLET: 25; 100 TABLET ORAL at 21:35

## 2025-06-13 RX ADMIN — SODIUM CHLORIDE 2000 MG: 900 INJECTION INTRAVENOUS at 21:35

## 2025-06-13 RX ADMIN — SODIUM CHLORIDE 2000 MG: 900 INJECTION INTRAVENOUS at 12:39

## 2025-06-13 RX ADMIN — ASPIRIN 81 MG: 81 TABLET, COATED ORAL at 08:01

## 2025-06-13 RX ADMIN — ACETAMINOPHEN 1000 MG: 500 TABLET ORAL at 08:00

## 2025-06-13 RX ADMIN — METOPROLOL TARTRATE 50 MG: 25 TABLET, FILM COATED ORAL at 08:01

## 2025-06-13 RX ADMIN — Medication 10 ML: at 08:01

## 2025-06-13 NOTE — CONSULTS
Orthopedic Consult      Patient: Dale Alas    Date of Admission: 6/12/2025  5:54 AM    YOB: 1955    Medical Record Number: [id number]    Attending Physician: Andres Zavaleta MD    Consulting Physician: Simba Araujo MD      Chief Complaints: Atrial fibrillation [I48.91]      History of Present Illness: 69 y.o. male admitted to [facility] with Atrial fibrillation [I48.91].  past medical history significant for obesity, diabetes, and prior left knee arthroplasty who was admitted to [facility] on 6/12/2025 after presenting to emergency department in [place] with complaints of left leg pain and swelling.   Patient states that around 1 week ago, he developed subjective fevers and sweats.  Along with fatigue.  Over the next couple of days, he noticed new left leg swelling with progressive redness.  He did have a skin abrasion on his left leg around a week ago prior to onset of symptoms.  In the emergency department at [place], he was found to be in A-fib with RVR.  He was transferred to [facility] for higher level of care.  HELLEN with cardioversion was attempted yesterday but the probe was unable to be passed.  He is currently on diltiazem drip for rate control.  He has never had A-fib in the past.    For his infection, he was seen by infectious disease who is requested that we evaluate to determine whether or not aspiration of his left knee was indicated given the presence of a total knee arthroplasty.  He denies any pain in his left knee and has been up ambulating.  He denies any effusion through the knee.  He denies any loss of motion since the lower extremity swelling began.  He reports he has had 3 surgeries on his left knee, the first in 2008 and the last around 2013 with Dr. Busby at [facility].  He also reports a prior plate in his left lower leg due to fracture many years ago but that has been removed.  He has had a vein procedure on his left lower extremity in  "the past as well.          Allergies   Allergen Reactions    Other Hives     \"Some kind of shot\" cannot recall name        Home Medications:  Medications Prior to Admission   Medication Sig Dispense Refill Last Dose/Taking    aspirin 81 MG EC tablet Take 1 tablet by mouth Daily.   6/11/2025 Evening    carbidopa-levodopa (SINEMET)  MG per tablet Take 1 tablet by mouth every night at bedtime.   6/11/2025    Cyanocobalamin (VITAMIN B-12 PO) Take 1 tablet by mouth 2 (Two) Times a Day.   6/11/2025    finasteride (PROSCAR) 5 MG tablet Take 1 tablet by mouth Daily.   6/11/2025    folic acid (FOLVITE) 1 MG tablet Take 1 tablet by mouth Daily.   6/11/2025 Morning    Insulin Glargine, 2 Unit Dial, (Toujeo Max SoloStar) 300 UNIT/ML solution pen-injector injection Inject 20 Units under the skin into the appropriate area as directed Daily.   6/11/2025    lisinopril (PRINIVIL,ZESTRIL) 20 MG tablet Take 1 tablet by mouth Every Morning.   6/11/2025    Melatonin 10 MG tablet Take 1 tablet by mouth Daily.   6/11/2025    meloxicam (MOBIC) 15 MG tablet Take 1 tablet by mouth Daily.   6/11/2025    metFORMIN ER (GLUCOPHAGE-XR) 500 MG 24 hr tablet Take 1 tablet by mouth 2 (Two) Times a Day With Meals.   6/11/2025 Evening    metoprolol tartrate (LOPRESSOR) 25 MG tablet Take 1 tablet by mouth 2 (Two) Times a Day. Patient unsure of dosage   6/11/2025 Evening    niacin (NIASPAN) 1000 MG CR tablet Take 1 tablet by mouth 2 (Two) Times a Day.   6/11/2025    pravastatin (PRAVACHOL) 80 MG tablet Take 1 tablet by mouth every night at bedtime.   6/11/2025 Evening    tamsulosin (FLOMAX) 0.4 MG capsule 24 hr capsule Take 1 capsule by mouth Daily.   6/11/2025    TART CHERRY PO Take 1 tablet by mouth Daily.   6/11/2025    Tirzepatide (Mounjaro) 2.5 MG/0.5ML solution pen-injector pen Inject 0.5 mL under the skin into the appropriate area as directed 1 (One) Time Per Week.   Past Week         Past Medical History:   Diagnosis Date    Arthritis     " Diabetes     Hypercholesteremia     MI (myocardial infarction) 2015    Sleep apnea     CPAP    Wears glasses         Past Surgical History:   Procedure Laterality Date    COLONOSCOPY      over 10 years ago    GALLBLADDER SURGERY N/A 2015    REPLACEMENT TOTAL KNEE Left 2013        Social History     Occupational History    Not on file   Tobacco Use    Smoking status: Former     Current packs/day: 1.00     Average packs/day: 1 pack/day for 1 year (1.0 ttl pk-yrs)     Types: Cigarettes    Smokeless tobacco: Never   Vaping Use    Vaping status: Never Used   Substance and Sexual Activity    Alcohol use: Never    Drug use: Never    Sexual activity: Defer      Social History     Social History Narrative    Not on file        Family History   Problem Relation Age of Onset    Prostate cancer Maternal Grandfather     Kidney cancer Maternal Grandfather     Cancer Son 34        [place] Sarcoma    Colon cancer Neg Hx          Review of Systems:   As above    Physical Exam: 69 y.o. male  General Appearance:    Alert, cooperative, in no acute distress                   Vitals:    06/13/25 0900 06/13/25 1113 06/13/25 1300 06/13/25 1516   BP:  108/61  129/75   BP Location:  Right arm     Patient Position:  Lying     Pulse: 111 74 92 94   Resp:  18  18   Temp:  97.8 °F (36.6 °C)  98.2 °F (36.8 °C)   TempSrc:  Oral  Oral   SpO2: 94% 95%  93%   Weight:       Height:            Head:    Normocephalic, without obvious abnormality, atraumatic        Left Lower Extremity: Patient has significant redness and edema centered at the lower aspect of the lower leg anteriorly and laterally.  This does track proximally to the level of the inferior pole of the patella.  At the level of the knee he has no effusion present.  He is well-healed previous scars.  He denies significant tenderness palpation about the knee.  Stable to varus valgus stress.  He is able to straight leg raise with good quad activation.  He has a slight extensor lag and is only  able to bend actively to about 45 degrees of flexion.  He reports this is his range of motion has had since his most recent surgery.          Diagnostic Tests:    I have reviewed the labs, radiology results and diagnostic studies:  X-rays of the left knee are available for review which show no evidence of fractures or loosening.  Revision total knee arthroplasty is in place.    Results from last 7 days   Lab Units 06/13/25  1158   WBC 10*3/mm3 9.40   HEMOGLOBIN g/dL 11.4*   PLATELETS 10*3/mm3 192     Results from last 7 days   Lab Units 06/13/25  1158   SODIUM mmol/L 134*   POTASSIUM mmol/L 4.3   CO2 mmol/L 24.0   CREATININE mg/dL 1.07   GLUCOSE mg/dL 346*           Assessment:    Atrial fibrillation    Primary hypertension    Prediabetes    Other hyperlipidemia    RLS (restless legs syndrome)    Cellulitis of left lower extremity      69-year-old with left lower extremity cellulitis.  I was consulted for concern for possible involvement of the total knee arthroplasty in his left knee.  His exam today does not suggest that there is intra-articular involvement of the infection.  He has no effusion he has got no pain with palpation or with range of motion or with weightbearing.  Given that the cellulitis does extend close to the level of the joint I do think that the risk of aspiration would outweigh the potential benefit.  I would recommend continued observation.  If there is any change in his exam or symptoms then we could always reconsider in the future.  No further treatment towards the knee is indicated at this time.  He can follow-up regarding his knee with Dr. Chan's team as needed or as previously scheduled.      Simba Araujo MD  06/13/25  16:59 EDT

## 2025-06-13 NOTE — PLAN OF CARE
Goal Outcome Evaluation:  Plan of Care Reviewed With: (P) patient        Progress: (P) no change  Outcome Evaluation: (P) Pt presented with increased pain, decreased activity tolerance and SOA limiting ADLs and mobility warranting OT services. Pt will benefit from strengthening UE exercises and activity tolerance to increase ADLs and mobility. Rec DC IPR.    Anticipated Discharge Disposition (OT): (P) inpatient rehabilitation facility

## 2025-06-13 NOTE — THERAPY EVALUATION
Patient Name: Dale Alas  : 1955    MRN: [id number]                              Today's Date: 2025       Admit Date: 2025    Visit Dx: No diagnosis found.  Patient Active Problem List   Diagnosis    Encounter for screening for malignant neoplasm of colon    Atrial fibrillation    Primary hypertension    Prediabetes    Other hyperlipidemia    RLS (restless legs syndrome)    Cellulitis of left lower extremity     Past Medical History:   Diagnosis Date    Arthritis     Diabetes     Hypercholesteremia     MI (myocardial infarction)     Sleep apnea     CPAP    Wears glasses      Past Surgical History:   Procedure Laterality Date    COLONOSCOPY      over 10 years ago    GALLBLADDER SURGERY N/A 2015    REPLACEMENT TOTAL KNEE Left       General Information       Row Name 25 1453          Physical Therapy Time and Intention    Document Type evaluation  -SUNSHINE     Mode of Treatment physical therapy  -SUNSHINE       Row Name 25 1453          General Information    Patient Profile Reviewed yes  -SUNSHINE     Prior Level of Function independent:;gait;transfer;ADL's;bed mobility  -SUNSHINE     Existing Precautions/Restrictions fall  -SUNSHINE     Barriers to Rehab medically complex  -SUNSHINE       Row Name 25 1453          Living Environment    Current Living Arrangements home  -SUNSHINE     People in Home child(elva), adult  -SUNSHINE       Row Name 25 1453          Home Main Entrance    Number of Stairs, Main Entrance one  -SUNSHINE     Stair Railings, Main Entrance none  -SUNSHINE       Row Name 25 1453          Cognition    Orientation Status (Cognition) oriented x 4  -SUNSHINE       Row Name 25 1453          Safety Issues/Impairments Affecting Functional Mobility    Safety Issues Affecting Function (Mobility) safety precautions follow-through/compliance  -SUNSHINE     Impairments Affecting Function (Mobility) balance;endurance/activity tolerance;strength  -SUNSHINE               User Key  (r) = Recorded By, (t) = Taken By, (c) =  Cosigned By      Initials Name Provider Type    Marissa Morelos PT Physical Therapist                   Mobility       Row Name 06/13/25 1454          Bed Mobility    Bed Mobility rolling left;rolling right;scooting/bridging;supine-sit;sit-supine  -SUNSHINE     Rolling Left [place] (Bed Mobility) modified independence  -SUNSHINE     Rolling Right [place] (Bed Mobility) modified independence  -SUNSHINE     Scooting/Bridging [place] (Bed Mobility) verbal cues;standby assist  -SUNSHINE     Supine-Sit [place] (Bed Mobility) standby assist;verbal cues  -SUNSHINE     Sit-Supine [place] (Bed Mobility) contact guard  -SUNSHINE     Assistive Device (Bed Mobility) bed rails;head of bed elevated  -SUNSHINE     Comment, (Bed Mobility) patient takes increased time and effort  -SUNSHINE       Row Name 06/13/25 1454          Bed-Chair Transfer    Bed-Chair [place] (Transfers) contact guard  -SUNSHINE     Assistive Device (Bed-Chair Transfers) walker, front-wheeled  -SUNSHINE       Row Name 06/13/25 1454          Sit-Stand Transfer    Sit-Stand [place] (Transfers) contact guard  -SUNSHINE     Assistive Device (Sit-Stand Transfers) walker, front-wheeled  -SUNSHINE       Row Name 06/13/25 1454          Gait/Stairs (Locomotion)    [place] Level (Gait) contact guard  -SUNSHINE     Assistive Device (Gait) walker, front-wheeled  -SUNSHINE     Distance in Feet (Gait) 120  -SUNSHINE     Deviations/Abnormal Patterns (Gait) robi decreased;stride length decreased;base of support, wide  -SUNSHINE     Left Sided Gait Deviations weight shift ability decreased  -SUNSHINE     Comment, (Gait/Stairs) patient with redness to left LE with pain and swelling patient has decreased weight shift to the left, and needs rolling walker for support which is below his baseline of no assistive device  -SUNSHINE               User Key  (r) = Recorded By, (t) = Taken By, (c) = Cosigned By      Initials Name Provider Type    Marissa Morelos PT Physical Therapist                   Obj/Interventions       Row Name  06/13/25 1454          Range of Motion Comprehensive    Comment, General Range of Motion slight decrease in left knee flexion due to pain  -       Row Name 06/13/25 1457          Strength Comprehensive (MMT)    Comment, General Manual Muscle Testing (MMT) Assessment bilat LE strength 4-/5 grossly  -[facility] Name 06/13/25 1457          Balance    Balance Assessment sitting static balance;sitting dynamic balance;standing static balance;standing dynamic balance  -SUNSHINE     Static Sitting Balance independent  -SUNSHINE     Dynamic Sitting Balance independent  -SUNSHINE     Position, Sitting Balance unsupported;sitting edge of bed  -SUNSHINE     Static Standing Balance contact guard  -SUNSHINE     Dynamic Standing Balance contact guard  -SUNSHINE     Position/Device Used, Standing Balance supported;walker, front-wheeled  -SUNSHINE               User Key  (r) = Recorded By, (t) = Taken By, (c) = Cosigned By      Initials Name Provider Type    Marissa Moreols A, PT Physical Therapist                   Goals/Plan       [facility] Name 06/13/25 1502          Bed Mobility Goal 1 (PT)    Activity/Assistive Device (Bed Mobility Goal 1, PT) bed mobility activities, all  -SUNSHINE     [place] Level/Cues Needed (Bed Mobility Goal 1, PT) independent  -SUNSHINE     Time Frame (Bed Mobility Goal 1, PT) short term goal (STG);3 days  -SUNSHINE     Progress/Outcomes (Bed Mobility Goal 1, PT) goal ongoing  -[facility] Name 06/13/25 1502          Transfer Goal 1 (PT)    Activity/Assistive Device (Transfer Goal 1, PT) sit-to-stand/stand-to-sit  -SUNSHINE     [place] Level/Cues Needed (Transfer Goal 1, PT) independent  -SUNSHINE     Time Frame (Transfer Goal 1, PT) long term goal (LTG);10 days  -SUNSHINE     Progress/Outcome (Transfer Goal 1, PT) goal ongoing  -[facility] Name 06/13/25 1502          Gait Training Goal 1 (PT)    Activity/Assistive Device (Gait Training Goal 1, PT) gait (walking locomotion)  -SUNSHINE     [place] Level (Gait Training Goal 1, PT) independent  -SUNSHINE     Distance (Gait  Training Goal 1, PT) 400  -SUNSHINE     Time Frame (Gait Training Goal 1, PT) long term goal (LTG);10 days  -SUNSHINE     Progress/Outcome (Gait Training Goal 1, PT) goal ongoing  -SUNSHINE       Row Name 06/13/25 1502          Therapy Assessment/Plan (PT)    Planned Therapy Interventions (PT) balance training;bed mobility training;gait training;home exercise program;strengthening;stair training;transfer training  -SUNSHINE               User Key  (r) = Recorded By, (t) = Taken By, (c) = Cosigned By      Initials Name Provider Type    Marissa Morelos, PT Physical Therapist                   Clinical Impression       Row Name 06/13/25 3718          Pain    Pretreatment Pain Rating 2/10  -SUNSHINE     Posttreatment Pain Rating 4/10  -SUNSHINE     Pain Location extremity  -SUNSHINE     Pain Side/Orientation left;lower  -SUNSHINE     Pain Management Interventions activity modification encouraged;exercise or physical activity utilized;nursing notified  -SUNSHINE     Response to Pain Interventions activity level improved;mobility function improved;activity participation with tolerable pain  -SUNSHINE       Row Name 06/13/25 7639          Plan of Care Review    Plan of Care Reviewed With patient  -SUNSHINE     Progress improving  -SUNSHINE     Outcome Evaluation PT eval is completed. patient presents with left LE edema and redness patient also found to be in a-fib with RVR, patient now demonstrates impaired bed mobility transfers and gait compared to his baseline status patient was able to ambulate 120 ft with walker and CGA anticipate patient to need IPR at D/C possible improveing to home with family if patient is able to progress with activity  -SUNSHINE       Row Name 06/13/25 7159          Therapy Assessment/Plan (PT)    Patient/Family Therapy Goals Statement (PT) return to PLOF  -SUNSHINE     Rehab Potential (PT) good  -SUNSHINE     Criteria for Skilled Interventions Met (PT) yes;skilled treatment is necessary  -SUNSHINE     Therapy Frequency (PT) daily  -SUNSHINE       Row Name 06/13/25 6556          Vital  Signs    Pre Systolic BP Rehab 110  -SUNSHINE     Pre Treatment Diastolic BP 62  -SUNSHINE     Post Systolic BP Rehab 132  -SUNSHINE     Post Treatment Diastolic BP 70  -SUNSHINE     Pretreatment Heart Rate (beats/min) 85  -SUNSHINE     Posttreatment Heart Rate (beats/min) 92  -SUNSHINE     Pre SpO2 (%) 97  -SUNSHINE     O2 Delivery Pre Treatment room air  -SUNSHINE     Post SpO2 (%) 96  -SUNSHINE     O2 Delivery Post Treatment room air  -SUNSHINE     Pre Patient Position Supine  -SUNSHINE     Intra Patient Position Standing  -SUNSHINE     Post Patient Position Supine  -SUNSHINE       Row Name 06/13/25 1458          Positioning and Restraints    Pre-Treatment Position in bed  -SUNSHINE     Post Treatment Position bed  -SUNSHINE     In Bed notified nsg;supine;call light within reach;encouraged to call for assist;exit alarm on;legs elevated  -SUNSHINE               User Key  (r) = Recorded By, (t) = Taken By, (c) = Cosigned By      Initials Name Provider Type    Marissa Morelos, PT Physical Therapist                   Outcome Measures       Row Name 06/13/25 1503 06/13/25 0800       How much help from another person do you currently need...    Turning from your back to your side while in flat bed without using bedrails? 4  -SUNSHINE 4  -AB    Moving from lying on back to sitting on the side of a flat bed without bedrails? 4  -SUNSHINE 4  -AB    Moving to and from a bed to a chair (including a wheelchair)? 3  -SUNSHINE 4  -AB    Standing up from a chair using your arms (e.g., wheelchair, bedside chair)? 3  -SUNSHINE 4  -AB    Climbing 3-5 steps with a railing? 3  -SUNSHINE 4  -AB    To walk in hospital room? 3  -SUNSHINE 4  -AB    AM-PAC 6 Clicks Score (PT) 20  -SUNSHINE 24  -AB      Row Name 06/13/25 1159          Functional Assessment    Outcome Measure Options AM-PAC 6 Clicks Daily Activity (OT)  -AN (r) KW (t) AN (c)               User Key  (r) = Recorded By, (t) = Taken By, (c) = Cosigned By      Initials Name Provider Type    Marissa Morelos, PT Physical Therapist    Lydia Smith, OT Occupational Therapist    Yesi Vo  S, RN Registered Nurse    Candice Brar, OT Student OT Student                                 Physical Therapy Education       Title: PT OT SLP Therapies (In Progress)       Topic: Physical Therapy (In Progress)       Point: Mobility training (In Progress)       Learning Progress Summary            Patient Acceptance, E, NR by SUNSHINE at 6/13/2025 1337                      Point: Home exercise program (In Progress)       Learning Progress Summary            Patient Acceptance, E, NR by SUNSHINE at 6/13/2025 1337                      Point: Body mechanics (In Progress)       Learning Progress Summary            Patient Acceptance, E, NR by SUNSHINE at 6/13/2025 1337                      Point: Precautions (In Progress)       Learning Progress Summary            Patient Acceptance, E, NR by SUNSHINE at 6/13/2025 1337                                      User Key       Initials Effective Dates Name Provider Type Discipline    SUNSHINE 02/03/23 -  Marissa Costa, PT Physical Therapist PT                  PT Recommendation and Plan  Planned Therapy Interventions (PT): balance training, bed mobility training, gait training, home exercise program, strengthening, stair training, transfer training  Progress: improving  Outcome Evaluation: PT eval is completed. patient presents with left LE edema and redness patient also found to be in a-fib with RVR, patient now demonstrates impaired bed mobility transfers and gait compared to his baseline status patient was able to ambulate 120 ft with walker and CGA anticipate patient to need IPR at D/C possible improveing to home with family if patient is able to progress with activity     Time Calculation:   PT Evaluation Complexity  History, PT Evaluation Complexity: 3 or more personal factors and/or comorbidities  Examination of Body Systems (PT Eval Complexity): total of 4 or more elements  Clinical Presentation (PT Evaluation Complexity): evolving  Clinical Decision Making (PT Evaluation  Complexity): moderate complexity  Overall Complexity (PT Evaluation Complexity): moderate complexity     PT Charges       Row Name 06/13/25 1504             Time Calculation    Start Time 1337  -SUNSHINE      PT Received On 06/13/25  -SUNSHINE      PT Goal Re-Cert Due Date 06/23/25  -SUNSHINE         Untimed Charges    PT Eval/Re-eval Minutes 46  -SUNSHINE         Total Minutes    Untimed Charges Total Minutes 46  -SUNSHINE       Total Minutes 46  -SUNSHINE                User Key  (r) = Recorded By, (t) = Taken By, (c) = Cosigned By      Initials Name Provider Type    Marissa Morelos, PT Physical Therapist                  Therapy Charges for Today       Code Description Service Date Service Provider Modifiers Qty    81380382849 HC PT EVAL MOD COMPLEXITY 4 6/13/2025 Marissa Costa, PT GP 1            PT G-Codes  Outcome Measure Options: AM-PAC 6 Clicks Daily Activity (OT)  AM-PAC 6 Clicks Score (PT): 20  AM-PAC 6 Clicks Score (OT): 20  PT Discharge Summary  Anticipated Discharge Disposition (PT): inpatient rehabilitation facility    Marissa Costa PT  6/13/2025

## 2025-06-13 NOTE — CASE MANAGEMENT/SOCIAL WORK
Continued Stay Note  KENNEDY Sparks     Patient Name: Dale Alas  MRN: [id number]  Today's Date: 6/13/2025    Admit Date: 6/12/2025    Plan: home   Discharge Plan       Row Name 06/13/25 1518       Plan    Patient/Family in Agreement with Plan yes    Plan Comments I spoke with this patient regarding therapy recommending rehab. He ambulated 120'. He wants to find out some more answers from CT and cultures on left leg  before deciding on rehab vs home health vs OPPT. CM to follow up on Monday.                   Discharge Codes    No documentation.                       Jeanette Langley RN

## 2025-06-13 NOTE — CONSULTS
INFECTIOUS DISEASES  INPATIENT CONSULT NOTE / INITIAL HOSPITAL VISIT      PATIENT NAME:  Dale Alas  YOB: 1955  MEDICAL RECORD NUMBER:  [id number]    Date of Admission:  6/12/2025  Date of Consult: 6/13/2025    Requesting Provider: MARIELA Zavaleta MD  Evaluating Physician: Carlos Dumont MD    CC:  left leg redness    Reason for Consultation:   left left cellulitis    History of Present Illness:  Patient is a 69 y.o. male with a past medical history significant for obesity, diabetes, and prior left knee arthroplasty who was admitted to  on 6/12/2025 after presenting to emergency department in [place] with complaints of left leg pain and swelling.   Patient states that around 1 week ago, he developed subjective fevers and sweats.  Along with fatigue.  Over the next couple of days, he noticed new left leg swelling with progressive redness.  He did have a skin abrasion on his left leg around a week ago prior to onset of symptoms.  In the emergency department at [place], he was found to be in A-fib with RVR.  He was transferred to [facility] for higher level of care.  HELLEN with cardioversion was attempted yesterday but the probe was unable to be passed.  He is currently on diltiazem drip for rate control.  He has never had A-fib in the past.  For his infection, he was placed on IV vancomycin and ceftriaxone.  He has been afebrile.  Blood cultures no growth so far.  He denies any pain in his left knee and has been up ambulating.  He reports he has had 3 surgeries on his left knee, the first in 2008 and the last around 2013 with Dr. Busby at [facility].  He also reports a prior plate in his left lower leg due to fracture many years ago but that has been removed.  He has had a vein procedure on his left lower extremity in the past as well.    I have been consulted to assist in workup and antimicrobial management of left leg cellulitis in the setting of remote left knee  "arthroplasty.    Past Medical History:   Diagnosis Date    Arthritis     Diabetes     Hypercholesteremia     MI (myocardial infarction) 2015    Sleep apnea     CPAP    Wears glasses        Past Surgical History:   Procedure Laterality Date    COLONOSCOPY      over 10 years ago    GALLBLADDER SURGERY N/A 2015    REPLACEMENT TOTAL KNEE Left 2013       Family History   Problem Relation Age of Onset    Prostate cancer Maternal Grandfather     Kidney cancer Maternal Grandfather     Cancer Son 34        Montano Sarcoma    Colon cancer Neg Hx        Social History     Socioeconomic History    Marital status:    Tobacco Use    Smoking status: Former     Current packs/day: 1.00     Average packs/day: 1 pack/day for 1 year (1.0 ttl pk-yrs)     Types: Cigarettes    Smokeless tobacco: Never   Vaping Use    Vaping status: Never Used   Substance and Sexual Activity    Alcohol use: Never    Drug use: Never    Sexual activity: Defer         Allergies:  Allergies   Allergen Reactions    Other Hives     \"Some kind of shot\" cannot recall name       Home Medications:  No current facility-administered medications on file prior to encounter.     Current Outpatient Medications on File Prior to Encounter   Medication Sig Dispense Refill    aspirin 81 MG EC tablet Take 1 tablet by mouth Daily.      carbidopa-levodopa (SINEMET)  MG per tablet Take 1 tablet by mouth every night at bedtime.      Cyanocobalamin (VITAMIN B-12 PO) Take 1 tablet by mouth 2 (Two) Times a Day.      finasteride (PROSCAR) 5 MG tablet Take 1 tablet by mouth Daily.      folic acid (FOLVITE) 1 MG tablet Take 1 tablet by mouth Daily.      Insulin Glargine, 2 Unit Dial, (Toujeo Max SoloStar) 300 UNIT/ML solution pen-injector injection Inject 20 Units under the skin into the appropriate area as directed Daily.      lisinopril (PRINIVIL,ZESTRIL) 20 MG tablet Take 1 tablet by mouth Every Morning.      Melatonin 10 MG tablet Take 1 tablet by mouth Daily.      " meloxicam (MOBIC) 15 MG tablet Take 1 tablet by mouth Daily.      metFORMIN ER (GLUCOPHAGE-XR) 500 MG 24 hr tablet Take 1 tablet by mouth 2 (Two) Times a Day With Meals.      metoprolol tartrate (LOPRESSOR) 25 MG tablet Take 1 tablet by mouth 2 (Two) Times a Day. Patient unsure of dosage      niacin (NIASPAN) 1000 MG CR tablet Take 1 tablet by mouth 2 (Two) Times a Day.      pravastatin (PRAVACHOL) 80 MG tablet Take 1 tablet by mouth every night at bedtime.      tamsulosin (FLOMAX) 0.4 MG capsule 24 hr capsule Take 1 capsule by mouth Daily.      TART CHERRY PO Take 1 tablet by mouth Daily.      Tirzepatide (Mounjaro) 2.5 MG/0.5ML solution pen-injector pen Inject 0.5 mL under the skin into the appropriate area as directed 1 (One) Time Per Week.         Current Hospital Medications:  Current Facility-Administered Medications   Medication Dose Route Frequency Provider Last Rate Last Admin    acetaminophen (TYLENOL) tablet 1,000 mg  1,000 mg Oral TID Andres Zavaleta MD   1,000 mg at 06/13/25 0800    apixaban (ELIQUIS) tablet 5 mg  5 mg Oral Q12H Zen Montano MD   5 mg at 06/13/25 0801    aspirin EC tablet 81 mg  81 mg Oral Daily Andres Zavaleta MD   81 mg at 06/13/25 0801    sennosides-docusate (PERICOLACE) 8.6-50 MG per tablet 2 tablet  2 tablet Oral BID PRN Andres Zavaleta MD        And    polyethylene glycol (MIRALAX) packet 17 g  17 g Oral Daily PRN Andres Zavaleta MD        And    bisacodyl (DULCOLAX) EC tablet 5 mg  5 mg Oral Daily PRN Andres Zavaleta MD        And    bisacodyl (DULCOLAX) suppository 10 mg  10 mg Rectal Daily PRN Andres Zavaleta MD        Calcium Replacement - Follow Nurse / BPA Driven Protocol   Not Applicable PRN Andres Zavaleta MD        carbidopa-levodopa (SINEMET)  MG per tablet 1 tablet  1 tablet Oral Nightly Andres Zavaleta MD   1 tablet at 06/12/25 0825    cefTRIAXone (ROCEPHIN) 2,000 mg in sodium chloride 0.9 % 100 mL MBP  2,000 mg Intravenous Q24H Andres Zavaleta   mL/hr at 06/13/25 0800 2,000 mg at 06/13/25 0800    dextrose (D50W) (25 g/50 mL) IV injection 25 g  25 g Intravenous Q15 Min PRN Andres Zavaleta MD        dextrose (GLUTOSE) oral gel 15 g  15 g Oral Q15 Min PRN Andres Zavaleta MD        digoxin (LANOXIN) injection 250 mcg  250 mcg Intravenous Q4H Terry Camacho MD   250 mcg at 06/13/25 0938    dilTIAZem (CARDIZEM) 125 mg in 125 mL D5W infusion  5-15 mg/hr Intravenous Titrated Andres Zavaleta MD 10 mL/hr at 06/13/25 0009 10 mg/hr at 06/13/25 0009    etomidate (AMIDATE) injection 15 mg  0.1 mg/kg Intravenous PRN Terry Camacho MD        etomidate (AMIDATE) injection 7 mg  0.05 mg/kg Intravenous PRN Terry Camacho MD        fentaNYL citrate (PF) (SUBLIMAZE) injection  mcg   mcg Intravenous PRN Terry Camacho MD        finasteride (PROSCAR) tablet 5 mg  5 mg Oral Daily Andres Zavaleta MD   5 mg at 06/13/25 0801    flumazenil (ROMAZICON) injection 0.5 mg  0.5 mg Intravenous Once PRN Teryr Camacho MD        folic acid (FOLVITE) tablet 1 mg  1 mg Oral Daily Andres Zavaleta MD   1 mg at 06/13/25 0801    glucagon (GLUCAGEN) injection 1 mg  1 mg Intramuscular Q15 Min PRN Andres Zavaleta MD        insulin glargine (LANTUS, SEMGLEE) injection 15 Units  15 Units Subcutaneous Daily Andres Zavaleta MD   15 Units at 06/13/25 0939    Insulin Lispro (humaLOG) injection 2-7 Units  2-7 Units Subcutaneous 4x Daily AC & at Bedtime Andres Zavaleta MD   4 Units at 06/13/25 0800    Magnesium Standard Dose Replacement - Follow Nurse / BPA Driven Protocol   Not Applicable PRN Andres Zavaleta MD        metoprolol tartrate (LOPRESSOR) tablet 100 mg  100 mg Oral BID Terry Camacho MD        midazolam (VERSED) injection 2-20 mg  2-20 mg Intravenous PRN Terry Camacho MD        naloxone (NARCAN) injection 0.4 mg  0.4 mg Intravenous Once PRN Terry Camacho MD        nitroglycerin (NITROSTAT) SL tablet 0.4 mg  0.4 mg Sublingual Q5 Min PRN Andres Zavaleta MD        ondansetron  ODT (ZOFRAN-ODT) disintegrating tablet 4 mg  4 mg Oral Q6H PRN Andres Zavaleta MD        Or    ondansetron (ZOFRAN) injection 4 mg  4 mg Intravenous Q6H PRN Andres Zavaleta MD        Phosphorus Replacement - Follow Nurse / BPA Driven Protocol   Not Applicable PRN Andres Zavaleta MD        Potassium Replacement - Follow Nurse / BPA Driven Protocol   Not Applicable PRN Andres Zavaleta MD        pravastatin (PRAVACHOL) tablet 80 mg  80 mg Oral Daily Andres Zavaleta MD   80 mg at 06/13/25 0801    sodium chloride 0.9 % flush 10 mL  10 mL Intravenous Q12H Andres Zavaleta MD   10 mL at 06/13/25 0801    sodium chloride 0.9 % flush 10 mL  10 mL Intravenous PRN Andres Zavaleta MD        sodium chloride 0.9 % infusion 40 mL  40 mL Intravenous PRN Andres Zavaleta MD        tamsulosin (FLOMAX) 24 hr capsule 0.4 mg  0.4 mg Oral Daily Andres Zavaleta MD   0.4 mg at 06/13/25 0800       Antimicrobials:  Anti-Infectives (From admission, onward)      Ordered     Dose/Rate Route Frequency Start Stop    06/12/25 0804  vancomycin 3000 mg/500 mL 0.9% NS IVPB ([facility])        Ordering Provider: Meg Brown RPH    3,000 mg  over 180 Minutes Intravenous Once 06/12/25 0900 06/12/25 1258    06/12/25 0658  cefTRIAXone (ROCEPHIN) 2,000 mg in sodium chloride 0.9 % 100 mL MBP        Ordering Provider: Andres Zavaleta MD    2,000 mg  200 mL/hr over 30 Minutes Intravenous Every 24 Hours 06/12/25 0800 06/17/25 0759    06/12/25 0658  Pharmacy to dose vancomycin  Status:  Discontinued        Ordering Provider: Andres Zavaleta MD     Not Applicable Continuous PRN 06/12/25 0658 06/12/25 1210            Review of Systems:   Constitutional: Subjective fever, chills, and sweats.  Positive fatigue.  HEENT:  No new vision, hearing or throat complaints.  No oral sores.  No headache, photophobia or neck stiffness.  CV:  No chest pain, palpitations, or syncope.  New onset A fib.  Respiratory:  No SOB, cough, or hemoptysis.  GI:  No nausea, vomiting,  "or diarrhea.  No hematochezia, melena, or hematemesis. No jaundice or liver disease.  :  No dysuria, hematuria, urgency, or flank pain.  Lymph:  No swollen lymph nodes in neck, axilla, or groin.   Hematologic:  No easy bruising or bleeding.  Endo: Uncontrolled diabetes.   Musculoskeletal:  No new swelling or pain of joints.  No new back pain.  Neurologic:  No acute focal weakness or numbness.  No seizures.  Skin:  No rashes, ulcers, or lesions.       Vital Signs:  Temp (24hrs), Av.4 °F (36.9 °C), Min:97.4 °F (36.3 °C), Max:99.1 °F (37.3 °C)    /75 (BP Location: Right arm, Patient Position: Lying)   Pulse 111   Temp 98.9 °F (37.2 °C) (Oral)   Resp 16   Ht 189.2 cm (74.49\")   Wt (!) 149 kg (328 lb 7.8 oz)   SpO2 94%   BMI 41.62 kg/m²     Physical Examination:  GENERAL: Acutely ill-appearing male.  Awake and alert, in no acute distress. Obese.  HEENT: Normocephalic, atraumatic.  EOMI. No conjunctival injection or subconjunctival hemorrhage.   NECK: Supple   CV: Irregularly irregular. No murmur, rubs, gallops.  Normal S1S2.  LUNGS: Clear to auscultation bilaterally without wheezing, rales, rhonchi. Normal respiratory effort.  ABDOMEN: Positive bowel sounds.  Soft, nontender, nondistended.  No rebound or guarding.  Obese  EXT:  +left leg edema  MSK: Good movement of left knee.  SKIN: No lesions of the left foot.  There is redness and warmth of the left lower extremity up to the left mid knee.  There are scabbed lesions of the left knee and the left anterior shin, with small amount of purulence from the left shin wound.  This was collected for culture.  Photos as noted of the left leg.  NEURO: A&Ox4. No focal deficits.  Face symmetric.  Speech fluent.  Moves all extremities well.   PSYCHIATRIC: Normal insight and judgement.  Cooperative.  Normal affect.    Left knee:      Left shin:      Left leg:          Laboratory Data:    Results from last 7 days   Lab Units 25  0744   WBC 10*3/mm3 10.24  " 9.87   HEMOGLOBIN g/dL 11.9*  11.9*   HEMATOCRIT % 36.2*  35.7*   PLATELETS 10*3/mm3 190  182     Results from last 7 days   Lab Units 06/12/25  0744   SODIUM mmol/L 135*   POTASSIUM mmol/L 4.0   CHLORIDE mmol/L 102   CO2 mmol/L 23.0   BUN mg/dL 11.1   CREATININE mg/dL 0.82   GLUCOSE mg/dL 295*   CALCIUM mg/dL 8.2*     Results from last 7 days   Lab Units 06/12/25  0744   ALK PHOS U/L 73   BILIRUBIN mg/dL 0.3   ALT (SGPT) U/L 16   AST (SGOT) U/L 21         Results from last 7 days   Lab Units 06/12/25 0744   CRP mg/dL 6.09*     Estimated Creatinine Clearance: 134.1 mL/min (by C-G formula based on SCr of 0.82 mg/dL).                    Microbiology:  Microbiology Results (last 10 days)       Procedure Component Value - Date/Time    Blood Culture - Blood, Hand, Right [[id number]]  (Normal) Collected: 06/12/25 0750    Lab Status: Preliminary result Specimen: Blood from Hand, Right Updated: 06/13/25 0815     Blood Culture No growth at 24 hours    Narrative:      Less than seven (7) mL's of blood was collected.  Insufficient quantity may yield false negative results.    Blood Culture - Blood, Hand, Left [[id number]]  (Normal) Collected: 06/12/25 0744    Lab Status: Preliminary result Specimen: Blood from Hand, Left Updated: 06/13/25 0815     Blood Culture No growth at 24 hours    Narrative:      Less than seven (7) mL's of blood was collected.  Insufficient quantity may yield false negative results.    MRSA Screen, PCR (Inpatient) - Swab, Nares [[id number]]  (Normal) Collected: 06/12/25 0724    Lab Status: Final result Specimen: Swab from Nares Updated: 06/12/25 0858     MRSA PCR Negative    Narrative:      The negative predictive value of this diagnostic test is high and should only be used to consider de-escalating anti-MRSA therapy. A positive result may indicate colonization with MRSA and must be correlated clinically.  MRSA Negative                Radiology:  Imaging Results (Last 72 Hours)       ** No results  found for the last 72 hours. **            Venous duplex 6/12/2025.    The patient is status post bilateral endovascular laser treatment of greater saphenous veins which are occluded and not well-visualized.  Otherwise normal bilateral lower extremity venous duplex scan.         IMPRESSION:  69 y.o. male with history of obesity, uncontrolled diabetes, and remote left knee arthroplasty admitted with left leg cellulitis.       PROBLEM LIST:   -- Left leg cellulitis.  Seems to have started after abrasion on the shin.  Small mild purulence able to be expressed from that wound.  Likely staphylococcal or streptococcal infection.  Risk to develop left knee periprosthetic joint infection.  -- History of remote left knee arthroplasty, last in 2013 by Dr. Matias at [facility].  He reports he has had 3 total surgeries on his left knee, the first arthroplasty followed by 2 revisions due to mechanical issues and not infection.    -- History of left lower leg orthopedic hardware, status post removal several years ago.  Details unknown.  -- History of left leg vein procedure.  Duplex this admission negative for DVT.  -- Atrial fibrillation, new onset.  Exacerbated by current infection.  -- Uncontrolled diabetes mellitus type 2.  Contributing to propensity for infection and poor wound healing.      PLAN:  -- Discontinue IV vancomycin and ceftriaxone  -- Start daptomycin 4 mg/kg IV daily and check baseline CK  -- Cefazolin 2 g IV every 8 hours  -- CT of the left leg to evaluate for left knee effusion or other fluid collection  -- Orthopedics consult to evaluate the left knee and consider arthrocentesis to send for cell count and culture if safe/able to avoid cellulitis to aspirate  -- I collected wound culture from left lower leg abrasion/drainage  -- Assess for adverse drug reactions from antimicrobials.  -- Follow vitals, exam, labs, and cultures.  -- Follow results of pending culture data and tailor antibiotics  as appropriate.  -- Final plans pending clinical course.    Complex case.  Thank you for the consultation.  I will continue to follow along with you.  Plan discussed with patient, nurse, and orthopedics.    This visit included the following complex service elements:  Complex medical decision-making associated with antimicrobial prescribing.  In-depth chart review with high level synthesis for complex diagnoses.        Carlos Dumont MD  6/13/2025  11:57 EDT

## 2025-06-13 NOTE — PROGRESS NOTES
"[facility] at [facility]  IP Progress Note      Chief Complaint: Follow-up of atrial fibrillation.    Subjective   Sitting on the side of bed eating breakfast.  Still has significant redness and pain in his left leg.  Denies chest pain or shortness of breath.  Not aware of his rate or rhythm.  HELLEN was attempted and could not be performed due to failure to pass the probe.    Objective     Blood pressure 122/75, pulse 82, temperature 98.9 °F (37.2 °C), temperature source Oral, resp. rate 16, height 189.2 cm (74.49\"), weight (!) 149 kg (328 lb 7.8 oz), SpO2 96%.   No intake or output data in the 24 hours ending 06/13/25 0837      Physical Exam:  General: No acute distress.   Neck: no JVD.  Chest:No respiratory distress, breath sounds are normal. No wheezes,  rhonchi or rales.  Cardiovascular: Normal S1 and S2,/distant.  Extremities: Left leg cellulitis, trace bilateral edema.    Results Review:     I reviewed the patient's new clinical results.    Results from last 7 days   Lab Units 06/12/25  0744   WBC 10*3/mm3 10.24  9.87   HEMOGLOBIN g/dL 11.9*  11.9*   HEMATOCRIT % 36.2*  35.7*   PLATELETS 10*3/mm3 190  182     Results from last 7 days   Lab Units 06/12/25  0744   SODIUM mmol/L 135*   POTASSIUM mmol/L 4.0   CHLORIDE mmol/L 102   CO2 mmol/L 23.0   BUN mg/dL 11.1   CREATININE mg/dL 0.82   CALCIUM mg/dL 8.2*   BILIRUBIN mg/dL 0.3   ALK PHOS U/L 73   ALT (SGPT) U/L 16   AST (SGOT) U/L 21   GLUCOSE mg/dL 295*     Results from last 7 days   Lab Units 06/12/25  0744   SODIUM mmol/L 135*   POTASSIUM mmol/L 4.0   CHLORIDE mmol/L 102   CO2 mmol/L 23.0   BUN mg/dL 11.1   CREATININE mg/dL 0.82   GLUCOSE mg/dL 295*   CALCIUM mg/dL 8.2*     Results from last 7 days   Lab Units 06/11/25  2342   INR  0.96     acetaminophen, 1,000 mg, Oral, TID  apixaban, 5 mg, Oral, Q12H  aspirin, 81 mg, Oral, Daily  carbidopa-levodopa, 1 tablet, Oral, Nightly  cefTRIAXone, 2,000 mg, Intravenous, Q24H  finasteride, 5 " mg, Oral, Daily  folic acid, 1 mg, Oral, Daily  insulin lispro, 2-7 Units, Subcutaneous, 4x Daily AC & at Bedtime  lisinopril, 20 mg, Oral, QAM  metoprolol tartrate, 50 mg, Oral, BID  pravastatin, 80 mg, Oral, Daily  sodium chloride, 10 mL, Intravenous, Q12H  tamsulosin, 0.4 mg, Oral, Daily         Tele: Atrial Fib with Controlled Rate    Assessment:  Atrial fibrillation with variable rate control, unclear duration.  Hypertension.  Obesity  Left lower extremity cellulitis.  Plan:  Discontinue lisinopril to allow further increasing the dose of metoprolol.  Increase metoprolol to 100 mg twice daily for better rate control.  Will also give loading dose of digoxin.  Continue Eliquis for anticoagulation.  Echocardiogram today to assess cardiac chamber size and LV function/valve function.  Management of medical conditions per hospitalist.  For management of atrial fibrillation we recommend rate control and anticoagulation strategy for now.  Patient will be reassessed in 6 weeks to to decide whether cardioversion is a reasonable option.  I will revisit on Monday if he is still here, if discharged he can follow-up with me in 6 weeks.    Terry Camacho MD, FACC, [facility]

## 2025-06-13 NOTE — PROGRESS NOTES
[facility]  PROGRESS NOTE    Patient Name: Dale Alas  : 1955  MRN: [id number]    Date of Admission: 2025  Primary Care Physician: Guillermo Enciso MD    Subjective   Subjective     CC: LLE pain    HPI: LLE pain/redness. No f/c. Dizzy when up. No palpitations. No SOA.       Objective   Objective     Vital Signs:   Temp:  [97.4 °F (36.3 °C)-99.1 °F (37.3 °C)] 98.9 °F (37.2 °C)  Heart Rate:  [] 82  Resp:  [12-18] 16  BP: ()/(41-75) 122/75  Flow (L/min) (Oxygen Therapy):  [3-15] 3     Physical Exam:  NAD, alert and oriented  OP clear, dry MM  Neck supple  No LAD  RRR  CTAB  LLE markedly red/warm, has not receded from marked line, TTP    Results Reviewed:  LAB RESULTS:      Lab 25  0744 25  2342   WBC 10.24  9.87  --    HEMOGLOBIN 11.9*  11.9*  --    HEMATOCRIT 36.2*  35.7*  --    PLATELETS 190  182  --    NEUTROS ABS 7.44*  7.16*  --    IMMATURE GRANS (ABS) 0.05  0.05  --    LYMPHS ABS 1.89  1.88  --    MONOS ABS 0.74  0.63  --    EOS ABS 0.09  0.09  --    MCV 84.8  83.8  --    CRP 6.09*  --    PROCALCITONIN 0.71*  --    PROTIME  --  9.4   APTT 64.0  --    HEPARIN ANTI-XA 0.57  --          Lab 25  0744 25  2342   SODIUM 135*  --    POTASSIUM 4.0  --    CHLORIDE 102  --    CO2 23.0  --    ANION GAP 10.0  --    BUN 11.1  --    CREATININE 0.82  --    EGFR 95.7  --    GLUCOSE 295*  --    CALCIUM 8.2*  --    MAGNESIUM  --  1.7*   HEMOGLOBIN A1C 8.90*  --          Lab 25  0744   TOTAL PROTEIN 6.5   ALBUMIN 3.3*   GLOBULIN 3.2   ALT (SGPT) 16   AST (SGOT) 21   BILIRUBIN 0.3   ALK PHOS 73         Lab 25  2342   PROTIME 9.4   INR 0.96                 Brief Urine Lab Results  (Last result in the past 365 days)        Color   Clarity   Blood   Leuk Est   Nitrite   Protein   CREAT   Urine HCG        25 1301 Yellow   Clear   Trace   Negative   Negative   Negative                   Microbiology Results  Abnormal       None            Duplex Venous Lower Extremity - Bilateral CAR  Result Date: 6/12/2025    The patient is status post bilateral endovascular laser treatment of greater saphenous veins which are occluded and not well-visualized.  Otherwise normal bilateral lower extremity venous duplex scan.       Results for orders placed during the hospital encounter of 06/12/25    Adult Transesophageal Echo (HELLEN) W/ Cont if Necessary Per Protocol    Interpretation Summary    HELLEN was attempted and was unsuccessful due to failure to advance the probe.      Current medications:  Scheduled Meds:acetaminophen, 1,000 mg, Oral, TID  apixaban, 5 mg, Oral, Q12H  aspirin, 81 mg, Oral, Daily  carbidopa-levodopa, 1 tablet, Oral, Nightly  cefTRIAXone, 2,000 mg, Intravenous, Q24H  digoxin, 250 mcg, Intravenous, Q4H  finasteride, 5 mg, Oral, Daily  folic acid, 1 mg, Oral, Daily  insulin lispro, 2-7 Units, Subcutaneous, 4x Daily AC & at Bedtime  metoprolol tartrate, 100 mg, Oral, BID  pravastatin, 80 mg, Oral, Daily  sodium chloride, 10 mL, Intravenous, Q12H  tamsulosin, 0.4 mg, Oral, Daily      Continuous Infusions:dilTIAZem, 5-15 mg/hr, Last Rate: 10 mg/hr (06/13/25 0009)      PRN Meds:.  senna-docusate sodium **AND** polyethylene glycol **AND** bisacodyl **AND** bisacodyl    Calcium Replacement - Follow Nurse / BPA Driven Protocol    dextrose    dextrose    etomidate    etomidate    fentaNYL citrate (PF)    flumazenil    glucagon (human recombinant)    Magnesium Standard Dose Replacement - Follow Nurse / BPA Driven Protocol    midazolam    naloxone    nitroglycerin    ondansetron ODT **OR** ondansetron    Phosphorus Replacement - Follow Nurse / BPA Driven Protocol    Potassium Replacement - Follow Nurse / BPA Driven Protocol    sodium chloride    sodium chloride    Assessment & Plan   Assessment & Plan     Active Hospital Problems    Diagnosis  POA    **Atrial fibrillation [I48.91]  Yes    Primary hypertension [I10]  Unknown     Prediabetes [R73.03]  Unknown    Other hyperlipidemia [E78.49]  Unknown    RLS (restless legs syndrome) [G25.81]  Unknown    Cellulitis of left lower extremity [L03.116]  Unknown      Resolved Hospital Problems   No resolved problems to display.        Brief Hospital Course to date:  Dale Alas is a 69 y.o. male here with dyspnea/LLE cellulitis    Dyspnea  New onset atrial fibrillation  -no PE on CTA  -on Eliquis  -rate control with BB per cardiology  -ECHO pending    LLE cellulitis  -MRSA PCR negative   -Rocephin  -ID consult    DM  -SSI    RLS  -on sinemet    HTN  -monitor        Expected Discharge Location and Transportation: Home  Expected Discharge TBD  Expected discharge date/ time has not been documented.     VTE Prophylaxis:  Pharmacologic & mechanical VTE prophylaxis orders are present.         AM-PAC 6 Clicks Score (PT): 24 (06/12/25 2000)    CODE STATUS:   Code Status and Medical Interventions: CPR (Attempt to Resuscitate); Full Support   Ordered at: 06/12/25 0654     Code Status (Patient has no pulse and is not breathing):    CPR (Attempt to Resuscitate)     Medical Interventions (Patient has pulse or is breathing):    Full Support       Andres Zavaleta MD  06/13/25

## 2025-06-13 NOTE — PLAN OF CARE
Goal Outcome Evaluation:  Plan of Care Reviewed With: patient        Progress: improving  Outcome Evaluation: PT eval is completed. patient presents with left LE edema and redness patient also found to be in a-fib with RVR, patient now demonstrates impaired bed mobility transfers and gait compared to his baseline status patient was able to ambulate 120 ft with walker and CGA anticipate patient to need IPR at D/C possible improveing to home with family if patient is able to progress with activity    Anticipated Discharge Disposition (PT): inpatient rehabilitation facility

## 2025-06-13 NOTE — THERAPY EVALUATION
Patient Name: Dale Alas  : 1955    MRN: [id number]                              Today's Date: 2025       Admit Date: 2025    Visit Dx: No diagnosis found.  Patient Active Problem List   Diagnosis    Encounter for screening for malignant neoplasm of colon    Atrial fibrillation    Primary hypertension    Prediabetes    Other hyperlipidemia    RLS (restless legs syndrome)    Cellulitis of left lower extremity     Past Medical History:   Diagnosis Date    Arthritis     Diabetes     Hypercholesteremia     MI (myocardial infarction)     Sleep apnea     CPAP    Wears glasses      Past Surgical History:   Procedure Laterality Date    COLONOSCOPY      over 10 years ago    GALLBLADDER SURGERY N/A 2015    REPLACEMENT TOTAL KNEE Left       General Information       Row Name 25 1141          OT Time and Intention    Subjective Information pain (P)   -KW     Document Type evaluation (P)   -KW     Mode of Treatment occupational therapy (P)   -KW     Patient Effort good (P)   -KW     Symptoms Noted During/After Treatment increased pain (P)   -KW     Comment Pt reports pain increased in his legs upon ambulation to a pain level 4. (P)   -KW       Row Name 25 1141          General Information    Patient Profile Reviewed yes (P)   -KW     Prior Level of Function independent:;all household mobility;community mobility;gait;feeding;grooming;dressing;bathing (P)   -KW     Existing Precautions/Restrictions fall (P)   BLE edema, overaall weakness and dyspnea  -KW     Barriers to Rehab medically complex (P)   -KW       Row Name 25 1141          Occupational Profile    Environmental Supports and Barriers (Occupational Profile) Pt reports walk-in shower, uses a 2 wheel RW, no falls within the last 3 months and eager to get home to his yard work and swimming pool. (P)   -KW       Row Name 25 1141          Living Environment    Current Living Arrangements home (P)   -KW     People in Home  child(elva), adult (P)   -KW       [facility] Name 06/13/25 1141          Home Main Entrance    Number of Stairs, Main Entrance one (P)   -KW     Stair Railings, Main Entrance none (P)   -KW       Row Name 06/13/25 1141          Stairs Within Home, Primary    Number of Stairs, Within Home, Primary none (P)   -KW     Stair Railings, Within Home, Primary none (P)   -KW       Row Name 06/13/25 1141          Cognition    Orientation Status (Cognition) oriented x 4 (P)   -KW       Row Name 06/13/25 1141          Safety Issues/Impairments Affecting Functional Mobility    Safety Issues Affecting Function (Mobility) awareness of need for assistance;insight into deficits/self-awareness;safety precaution awareness;safety precautions follow-through/compliance (P)   -KW     Impairments Affecting Function (Mobility) balance;coordination;endurance/activity tolerance;pain;shortness of breath;strength (P)   -KW               User Key  (r) = Recorded By, (t) = Taken By, (c) = Cosigned By      Initials Name Provider Type    Candice Brar OT Student OT Student                     Mobility/ADL's       [facility] 06/13/25 1144          Bed Mobility    Bed Mobility scooting/bridging;supine-sit (P)   -KW     Scooting/Bridging [place] (Bed Mobility) verbal cues;standby assist (P)   -KW     Supine-Sit [place] (Bed Mobility) standby assist;verbal cues (P)   -KW     Assistive Device (Bed Mobility) bed rails;head of bed elevated (P)   -KW       Row Name 06/13/25 1144          Transfers    Transfers sit-stand transfer;stand-sit transfer (P)   -KW       Row Name 06/13/25 1144          Sit-Stand Transfer    Sit-Stand [place] (Transfers) contact guard;verbal cues (P)   -KW     Assistive Device (Sit-Stand Transfers) walker, front-wheeled (P)   -KW       Row Name 06/13/25 1144          Stand-Sit Transfer    Stand-Sit [place] (Transfers) verbal cues;contact guard (P)   -KW     Assistive Device (Stand-Sit Transfers) walker,  front-wheeled (P)   -KW       Row Name 06/13/25 1144          Functional Mobility    Functional Mobility- Ind. Level contact guard assist;verbal cues required;nonverbal cues required (demo/gesture) (P)   -KW     Functional Mobility- Device walker, front-wheeled (P)   -KW     Functional Mobility-Distance (Feet) -- (P)   <household distance  -KW     Functional Mobility- Safety Issues balance decreased during turns;steps too close front assistive device (P)   -KW       Row Name 06/13/25 1144          Activities of Daily Living    BADL Assessment/Intervention upper body dressing;lower body dressing;grooming;bathing (P)   -KW       Row Name 06/13/25 1144          Upper Body Dressing Assessment/Training    [place] Level (Upper Body Dressing) don;pajama/robe;minimum assist (75% patient effort);verbal cues (P)   -KW     Position (Upper Body Dressing) edge of bed sitting (P)   -KW       Row Name 06/13/25 1144          Lower Body Dressing Assessment/Training    [place] Level (Lower Body Dressing) doff;don;verbal cues;nonverbal cues (demo/gesture);minimum assist (75% patient effort) (P)   Pt was able to doff socks, needed min A for don socks  -KW     Assistive Devices (Lower Body Dressing) reacher (P)   -KW     Position (Lower Body Dressing) unsupported sitting (P)   -KW       Row Name 06/13/25 1144          Grooming Assessment/Training    [place] Level (Grooming) wash face, hands;set up (P)   -KW     Position (Grooming) unsupported sitting (P)   -KW       Row Name 06/13/25 1144          Bathing Assessment/Intervention    Assistive Devices (Bathing) long-handled sponge (P)   -KW     Comment, (Bathing) Issued longhandled sponge and education. (P)   -KW               User Key  (r) = Recorded By, (t) = Taken By, (c) = Cosigned By      Initials Name Provider Type    Candice Brar, OT Student OT Student                   Obj/Interventions       Row Name 06/13/25 1149          Sensory Assessment  (Somatosensory)    Sensory Assessment (Somatosensory) sensation intact (P)   -KW       Row Name 06/13/25 1149          Vision Assessment/Intervention    Visual Impairment/Limitations WNL (P)   -[facility] Name 06/13/25 1149          Range of Motion Comprehensive    General Range of Motion no range of motion deficits identified (P)   -KW       Row Name 06/13/25 1149          Strength Comprehensive (MMT)    Comment, General Manual Muscle Testing (MMT) Assessment LUE 4-/5, RUE 4+/5 (P)   -KW       Row Name 06/13/25 1149          Hand (Therapeutic Exercise)    Hand (Therapeutic Exercise) strengthening exercise (P)   -KW     Hand Strengthening (Therapeutic Exercise) hand gripper;10 repetitions;other (see comments) (P)   Blue sponge hand gripper utilized  -KW       Row Name 06/13/25 1149          Motor Skills    Therapeutic Exercise hand (P)   -KW       Row Name 06/13/25 1149          Balance    Balance Assessment sitting static balance;sitting dynamic balance;sit to stand dynamic balance;standing static balance;standing dynamic balance (P)   -KW     Static Sitting Balance standby assist;verbal cues (P)   -KW     Dynamic Sitting Balance standby assist;verbal cues (P)   -KW     Position, Sitting Balance sitting edge of bed;sitting in chair;unsupported (P)   -KW     Sit to Stand Dynamic Balance contact guard;verbal cues (P)   -KW     Static Standing Balance verbal cues;contact guard (P)   -KW     Dynamic Standing Balance verbal cues;contact guard (P)   -KW     Position/Device Used, Standing Balance walker, front-wheeled (P)   -KW     Balance Interventions sitting;standing;sit to stand;supported;static;dynamic;minimal challenge (P)   -KW               User Key  (r) = Recorded By, (t) = Taken By, (c) = Cosigned By      Initials Name Provider Type    Candice Brar, OT Student OT Student                   Goals/Plan       Row Name 06/13/25 1156          Bathing Goal 1 (OT)    Activity/Device (Bathing Goal 1, OT)  lower body bathing (P)   -KW     [place] Level/Cues Needed (Bathing Goal 1, OT) minimum assist (75% or more patient effort) (P)   -KW     Time Frame (Bathing Goal 1, OT) long term goal (LTG);10 days (P)   -KW       Row Name 06/13/25 1156          Dressing Goal 1 (OT)    Activity/Device (Dressing Goal 1, OT) lower body dressing;reacher (P)   -KW     [place]/Cues Needed (Dressing Goal 1, OT) contact guard required (P)   -KW     Time Frame (Dressing Goal 1, OT) short term goal (STG);5 days (P)   -KW       Row Name 06/13/25 1156          Toileting Goal 1 (OT)    Activity/Device (Toileting Goal 1, OT) commode (P)   -KW     [place] Level/Cues Needed (Toileting Goal 1, OT) standby assist (P)   -KW     Time Frame (Toileting Goal 1, OT) short term goal (STG);5 days (P)   -KW       Row Name 06/13/25 1156          Therapy Assessment/Plan (OT)    Planned Therapy Interventions (OT) activity tolerance training;adaptive equipment training;BADL retraining;edema control/reduction;functional balance retraining;occupation/activity based interventions;patient/caregiver education/training;strengthening exercise (P)   -KW               User Key  (r) = Recorded By, (t) = Taken By, (c) = Cosigned By      Initials Name Provider Type    Candice Brar, OT Student OT Student                   Clinical Impression       Row Name 06/13/25 1151          Pain Assessment    Pretreatment Pain Rating 2/10 (P)   -KW     Posttreatment Pain Rating 3/10 (P)   -KW     Pain Location other (see comments) (P)   Legs increased in pain  -KW     Pain Side/Orientation bilateral (P)   -KW     Pain Management Interventions activity modification encouraged;exercise or physical activity utilized;positioning techniques utilized (P)   -KW     Response to Pain Interventions activity participation with increased pain (P)   -KW       Row Name 06/13/25 1151          Plan of Care Review    Plan of Care Reviewed With patient (P)   -KW     Progress  no change (P)   -KW     Outcome Evaluation Pt presented with increased pain, decreased activity tolerance and SOA limiting ADLs and mobility warranting OT services. Pt will benefit from strengthening UE exercises and activity tolerance to increase ADLs and mobility. Rec DC IPR. (P)   -KW       Row Name 06/13/25 1151          Therapy Assessment/Plan (OT)    Patient/Family Therapy Goal Statement (OT) Return to PLOF (P)   -KW     Rehab Potential (OT) good (P)   -KW     Criteria for Skilled Therapeutic Interventions Met (OT) yes;meets criteria;skilled treatment is necessary (P)   -KW     Therapy Frequency (OT) daily (P)   -KW     Predicted Duration of Therapy Intervention (OT) 10 days (P)   -KW       Row Name 06/13/25 1151          Therapy Plan Review/Discharge Plan (OT)    Anticipated Discharge Disposition (OT) inpatient rehabilitation facility (P)   -KW       Row Name 06/13/25 1151          Vital Signs    Pre Systolic BP Rehab 122 (P)   -KW     Pre Treatment Diastolic BP 75 (P)   -KW     Post Systolic BP Rehab 146 (P)   -KW     Post Treatment Diastolic BP 76 (P)   -KW     Pretreatment Heart Rate (beats/min) 99 (P)   -KW     Intratreatment Resp Rate (breaths/min) 99 (P)   -KW     Pre SpO2 (%) 96 (P)   -KW     O2 Delivery Pre Treatment room air (P)   -KW     Post SpO2 (%) 96 (P)   -KW     O2 Delivery Post Treatment room air (P)   -KW     Pre Patient Position Supine (P)   -KW     Intra Patient Position Standing (P)   -KW     Post Patient Position Sitting (P)   -KW       Row Name 06/13/25 1151          Positioning and Restraints    Pre-Treatment Position in bed (P)   -KW     Post Treatment Position chair (P)   -KW     In Chair notified nsg;sitting;call light within reach;with other staff;encouraged to call for assist;exit alarm on;waffle cushion (P)   -KW               User Key  (r) = Recorded By, (t) = Taken By, (c) = Cosigned By      Initials Name Provider Type    Candice Brar, OT Student OT Student                    Outcome Measures       Row Name 06/13/25 1159          How much help from another is currently needed...    Putting on and taking off regular lower body clothing? 3 (P)   -KW     Bathing (including washing, rinsing, and drying) 3 (P)   -KW     Toileting (which includes using toilet bed pan or urinal) 3 (P)   -KW     Putting on and taking off regular upper body clothing 3 (P)   -KW     Taking care of personal grooming (such as brushing teeth) 4 (P)   -KW     Eating meals 4 (P)   -KW     AM-PAC 6 Clicks Score (OT) 20 (P)   -KW       Row Name 06/13/25 0800          How much help from another person do you currently need...    Turning from your back to your side while in flat bed without using bedrails? 4  -AB     Moving from lying on back to sitting on the side of a flat bed without bedrails? 4  -AB     Moving to and from a bed to a chair (including a wheelchair)? 4  -AB     Standing up from a chair using your arms (e.g., wheelchair, bedside chair)? 4  -AB     Climbing 3-5 steps with a railing? 4  -AB     To walk in hospital room? 4  -AB     AM-PAC 6 Clicks Score (PT) 24  -AB       Row Name 06/13/25 1159          Functional Assessment    Outcome Measure Options AM-PAC 6 Clicks Daily Activity (OT) (P)   -KW               User Key  (r) = Recorded By, (t) = Taken By, (c) = Cosigned By      Initials Name Provider Type    AB Yesi Arboleda, RN Registered Nurse    Candice Brar, OT Student OT Student                    Occupational Therapy Education       Title: PT OT SLP Therapies (Done)       Topic: Occupational Therapy (Done)       Point: ADL training (Done)       Learning Progress Summary            Patient Acceptance, E, VU by SEGUNDO at 6/13/2025 1200                      Point: Precautions (Done)       Learning Progress Summary            Patient Acceptance, E, VU by SEGUNDO at 6/13/2025 1200                      Point: Body mechanics (Done)       Learning Progress Summary            Patient Acceptance,  E, VU by  at 6/13/2025 1200                                      User Key       Initials Effective Dates Name Provider Type Discipline     04/23/25 -  Candice Burgess, OT Student OT Student OT                  OT Recommendation and Plan  Planned Therapy Interventions (OT): (P) activity tolerance training, adaptive equipment training, BADL retraining, edema control/reduction, functional balance retraining, occupation/activity based interventions, patient/caregiver education/training, strengthening exercise  Therapy Frequency (OT): (P) daily  Plan of Care Review  Plan of Care Reviewed With: (P) patient  Progress: (P) no change  Outcome Evaluation: (P) Pt presented with increased pain, decreased activity tolerance and SOA limiting ADLs and mobility warranting OT services. Pt will benefit from strengthening UE exercises and activity tolerance to increase ADLs and mobility. Rec DC IPR.     Time Calculation:   Evaluation Complexity (OT)  Review Occupational Profile/Medical/Therapy History Complexity: (P) expanded/moderate complexity  Assessment, Occupational Performance/Identification of Deficit Complexity: (P) 3-5 performance deficits  Clinical Decision Making Complexity (OT): (P) detailed assessment/moderate complexity  Overall Complexity of Evaluation (OT): (P) moderate complexity     Time Calculation- OT       Row Name 06/13/25 1200             Time Calculation- OT    OT Start Time 0850 (P)   -KW      OT Received On 06/13/25 (P)   -KW      OT Goal Re-Cert Due Date 06/23/25 (P)   -KW         Timed Charges    95294 - OT Therapeutic Activity Minutes 12 (P)   -KW         Untimed Charges    OT Eval/Re-eval Minutes 50 (P)   -KW         Total Minutes    Timed Charges Total Minutes 12 (P)   -KW      Untimed Charges Total Minutes 50 (P)   -KW       Total Minutes 62 (P)   -KW                User Key  (r) = Recorded By, (t) = Taken By, (c) = Cosigned By      Initials Name Provider Type    KW Candice Burgess, OT  Student OT Student                  Therapy Charges for Today       Code Description Service Date Service Provider Modifiers Qty    [id number]  OT THERAPEUTIC ACT EA 15 MIN 6/13/2025 Candice Burgess, OT Student GO 1    [id number]  OT EVAL MOD COMPLEXITY 4 6/13/2025 Candice Burgess, OT Student GO 1                 Candice Burgess OT Student  6/13/2025

## 2025-06-13 NOTE — PLAN OF CARE
Problem: Adult Inpatient Plan of Care  Goal: Absence of Hospital-Acquired Illness or Injury  Intervention: Identify and Manage Fall Risk  Recent Flowsheet Documentation  Taken 6/13/2025 0200 by Carlos Traylor, RN  Safety Promotion/Fall Prevention:   nonskid shoes/slippers when out of bed   safety round/check completed  Taken 6/12/2025 2200 by Carlos Traylor, RN  Safety Promotion/Fall Prevention:   nonskid shoes/slippers when out of bed   safety round/check completed  Taken 6/12/2025 2000 by Carlos Traylor, RN  Safety Promotion/Fall Prevention:   activity supervised   assistive device/personal items within reach   clutter free environment maintained   fall prevention program maintained   lighting adjusted   nonskid shoes/slippers when out of bed   room organization consistent   safety round/check completed   Goal Outcome Evaluation:

## 2025-06-13 NOTE — CASE MANAGEMENT/SOCIAL WORK
Continued Stay Note   [place]     Patient Name: Dale Alas  MRN: [id number]  Today's Date: 6/13/2025    Admit Date: 6/12/2025    Plan: home   Discharge Plan       Row Name 06/13/25 0838       Plan    Plan home    Patient/Family in Agreement with Plan yes    Plan Comments I met with this patient bedside. His plan remains home at discharge and his son can transport. Therapy still needs to evaluate this patient. CM to follow.    Final Discharge Disposition Code 01 - home or self-care                   Discharge Codes    No documentation.                       Jeanette Langley RN

## 2025-06-13 NOTE — PLAN OF CARE
Goal Outcome Evaluation:  Plan of Care Reviewed With: patient        Progress: improving  Outcome Evaluation: -VSS, A&Ox4, RA. Pt up in the chair with PT today. Redness has improved to LLE. Cardizem continues at 10gtt, and Digoxin added q4h. CT/Xray of LLE completed today, both negative for any concerns. Plan is to wait on cx of LLE and continue IV ATBs.         Problem: Adult Inpatient Plan of Care  Goal: Plan of Care Review  Outcome: Progressing  Flowsheets (Taken 6/13/2025 1749)  Progress: improving  Outcome Evaluation: -VSS, A&Ox4, RA. Pt up in the chair with PT today. Redness has improved to LLE. Cardizem continues at 10gtt, and Digoxin added q4h. CT/Xray of LLE completed today, both negative for any concerns. Plan is to wait on cx of LLE and continue IV ATBs.  Plan of Care Reviewed With: patient  Goal: Patient-Specific Goal (Individualized)  Outcome: Progressing  Goal: Absence of Hospital-Acquired Illness or Injury  Outcome: Progressing  Intervention: Identify and Manage Fall Risk  Recent Flowsheet Documentation  Taken 6/13/2025 1600 by Yesi Arboleda RN  Safety Promotion/Fall Prevention:   activity supervised   clutter free environment maintained   assistive device/personal items within reach   fall prevention program maintained   safety round/check completed  Taken 6/13/2025 1400 by Yesi Arboleda RN  Safety Promotion/Fall Prevention:   activity supervised   assistive device/personal items within reach   clutter free environment maintained   fall prevention program maintained   safety round/check completed  Taken 6/13/2025 1200 by Yesi Arboleda RN  Safety Promotion/Fall Prevention:   activity supervised   assistive device/personal items within reach   clutter free environment maintained   fall prevention program maintained   safety round/check completed  Taken 6/13/2025 1000 by Yesi Arboleda RN  Safety Promotion/Fall Prevention:   activity supervised   assistive device/personal items within  reach   clutter free environment maintained   fall prevention program maintained   safety round/check completed  Taken 6/13/2025 0800 by Yesi Arboleda RN  Safety Promotion/Fall Prevention:   activity supervised   assistive device/personal items within reach   clutter free environment maintained   fall prevention program maintained   safety round/check completed  Intervention: Prevent Skin Injury  Recent Flowsheet Documentation  Taken 6/13/2025 1600 by Yesi Arboleda RN  Body Position: position changed independently  Taken 6/13/2025 1400 by Yesi Arboleda RN  Body Position: position changed independently  Taken 6/13/2025 1000 by Yesi Arboleda RN  Body Position: legs elevated  Taken 6/13/2025 0800 by Yesi Arboleda RN  Body Position: position changed independently  Goal: Optimal Comfort and Wellbeing  Outcome: Progressing  Goal: Readiness for Transition of Care  Outcome: Progressing     Problem: Comorbidity Management  Goal: Blood Glucose Level Within Target Range  Outcome: Progressing  Goal: Blood Pressure in Desired Range  Outcome: Progressing

## 2025-06-14 ENCOUNTER — APPOINTMENT (OUTPATIENT)
Dept: CARDIOLOGY | Facility: HOSPITAL | Age: 70
End: 2025-06-14
Payer: MEDICARE

## 2025-06-14 LAB
ALBUMIN SERPL-MCNC: 3.2 G/DL (ref 3.5–5.2)
ALBUMIN/GLOB SERPL: 0.8 G/DL
ALP SERPL-CCNC: 84 U/L (ref 39–117)
ALT SERPL W P-5'-P-CCNC: 29 U/L (ref 1–41)
ANION GAP SERPL CALCULATED.3IONS-SCNC: 10 MMOL/L (ref 5–15)
AST SERPL-CCNC: 32 U/L (ref 1–40)
BILIRUB SERPL-MCNC: 0.3 MG/DL (ref 0–1.2)
BUN SERPL-MCNC: 13.1 MG/DL (ref 8–23)
BUN/CREAT SERPL: 11.9 (ref 7–25)
CALCIUM SPEC-SCNC: 8.9 MG/DL (ref 8.6–10.5)
CHLORIDE SERPL-SCNC: 102 MMOL/L (ref 98–107)
CK SERPL-CCNC: 90 U/L (ref 20–200)
CO2 SERPL-SCNC: 24 MMOL/L (ref 22–29)
CREAT SERPL-MCNC: 1.1 MG/DL (ref 0.76–1.27)
EGFRCR SERPLBLD CKD-EPI 2021: 73.1 ML/MIN/1.73
GLOBULIN UR ELPH-MCNC: 3.9 GM/DL
GLUCOSE BLDC GLUCOMTR-MCNC: 204 MG/DL (ref 70–130)
GLUCOSE BLDC GLUCOMTR-MCNC: 234 MG/DL (ref 70–130)
GLUCOSE BLDC GLUCOMTR-MCNC: 237 MG/DL (ref 70–130)
GLUCOSE BLDC GLUCOMTR-MCNC: 243 MG/DL (ref 70–130)
GLUCOSE SERPL-MCNC: 217 MG/DL (ref 65–99)
POTASSIUM SERPL-SCNC: 4.5 MMOL/L (ref 3.5–5.2)
PROT SERPL-MCNC: 7.1 G/DL (ref 6–8.5)
SODIUM SERPL-SCNC: 136 MMOL/L (ref 136–145)

## 2025-06-14 PROCEDURE — 63710000001 INSULIN LISPRO (HUMAN) PER 5 UNITS: Performed by: HOSPITALIST

## 2025-06-14 PROCEDURE — 80053 COMPREHEN METABOLIC PANEL: CPT | Performed by: HOSPITALIST

## 2025-06-14 PROCEDURE — 82948 REAGENT STRIP/BLOOD GLUCOSE: CPT

## 2025-06-14 PROCEDURE — 25010000002 DAPTOMYCIN PER 1 MG: Performed by: INTERNAL MEDICINE

## 2025-06-14 PROCEDURE — 63710000001 INSULIN GLARGINE PER 5 UNITS: Performed by: INTERNAL MEDICINE

## 2025-06-14 PROCEDURE — 82550 ASSAY OF CK (CPK): CPT | Performed by: INTERNAL MEDICINE

## 2025-06-14 PROCEDURE — 99233 SBSQ HOSP IP/OBS HIGH 50: CPT | Performed by: INTERNAL MEDICINE

## 2025-06-14 PROCEDURE — 25010000002 CEFAZOLIN PER 500 MG: Performed by: INTERNAL MEDICINE

## 2025-06-14 RX ADMIN — EMPAGLIFLOZIN 10 MG: 10 TABLET, FILM COATED ORAL at 08:50

## 2025-06-14 RX ADMIN — INSULIN GLARGINE 25 UNITS: 100 INJECTION, SOLUTION SUBCUTANEOUS at 08:50

## 2025-06-14 RX ADMIN — Medication 5 MG/HR: at 11:43

## 2025-06-14 RX ADMIN — PRAVASTATIN SODIUM 80 MG: 40 TABLET ORAL at 08:50

## 2025-06-14 RX ADMIN — INSULIN LISPRO 4 UNITS: 100 INJECTION, SOLUTION INTRAVENOUS; SUBCUTANEOUS at 17:08

## 2025-06-14 RX ADMIN — FOLIC ACID 1 MG: 1 TABLET ORAL at 08:50

## 2025-06-14 RX ADMIN — INSULIN LISPRO 4 UNITS: 100 INJECTION, SOLUTION INTRAVENOUS; SUBCUTANEOUS at 11:42

## 2025-06-14 RX ADMIN — APIXABAN 5 MG: 5 TABLET, FILM COATED ORAL at 08:50

## 2025-06-14 RX ADMIN — METOPROLOL TARTRATE 100 MG: 100 TABLET, FILM COATED ORAL at 21:57

## 2025-06-14 RX ADMIN — METOPROLOL TARTRATE 100 MG: 100 TABLET, FILM COATED ORAL at 08:50

## 2025-06-14 RX ADMIN — ASPIRIN 81 MG: 81 TABLET, COATED ORAL at 08:50

## 2025-06-14 RX ADMIN — CARBIDOPA AND LEVODOPA 1 TABLET: 25; 100 TABLET ORAL at 21:57

## 2025-06-14 RX ADMIN — FINASTERIDE 5 MG: 5 TABLET, FILM COATED ORAL at 08:50

## 2025-06-14 RX ADMIN — SODIUM CHLORIDE 2000 MG: 900 INJECTION INTRAVENOUS at 21:57

## 2025-06-14 RX ADMIN — TAMSULOSIN HYDROCHLORIDE 0.4 MG: 0.4 CAPSULE ORAL at 08:50

## 2025-06-14 RX ADMIN — Medication 10 MG/HR: at 03:53

## 2025-06-14 RX ADMIN — SODIUM CHLORIDE 2000 MG: 900 INJECTION INTRAVENOUS at 05:46

## 2025-06-14 RX ADMIN — APIXABAN 5 MG: 5 TABLET, FILM COATED ORAL at 21:57

## 2025-06-14 RX ADMIN — SODIUM CHLORIDE 2000 MG: 900 INJECTION INTRAVENOUS at 13:41

## 2025-06-14 RX ADMIN — DAPTOMYCIN 450 MG: 500 INJECTION, POWDER, LYOPHILIZED, FOR SOLUTION INTRAVENOUS at 15:01

## 2025-06-14 RX ADMIN — INSULIN LISPRO 4 UNITS: 100 INJECTION, SOLUTION INTRAVENOUS; SUBCUTANEOUS at 08:50

## 2025-06-14 NOTE — PROGRESS NOTES
"  INFECTIOUS DISEASES  INPATIENT PROGRESS NOTE  2025      PATIENT NAME: Dale Alas  :  1955  MRN:  [id number]  Date of Admission:  2025      Antimicrobials:  ceFAZolin 2000 mg IVPB in 100 mL NS (MBP)  DAPTOmycin (CUBICIN)  IV      MAR reviewed.     Reason for consultation: Left leg cellulitis    Interval history: Patient reports ongoing swelling in his left leg.  He thinks the redness is a bit better.  No drainage.  No fever.  Tolerating antibiotics.  Orthopedics evaluated his knee yesterday.  Low suspicion for PJI and no aspiration done at this point, especially with risk for infecting the joint with overlying cellulitis.  He continues to deny any pain in his knee.  CT yesterday without effusion or fluid collection of knee/leg.    ROS:  No fevers/chills overnight.  No new rashes.  No SOB or chest pain.  No nausea/vomiting/diarrhea.  Denies side effects from antimicrobials.     Objective:  Temp (24hrs), Av.7 °F (37.1 °C), Min:98.1 °F (36.7 °C), Max:99.2 °F (37.3 °C)    /81   Pulse 74   Temp 98.6 °F (37 °C) (Oral)   Resp 18   Ht 189.2 cm (74.49\")   Wt (!) 149 kg (328 lb 7.8 oz)   SpO2 95%   BMI 41.62 kg/m²     Physical Examination:  GENERAL: Acutely ill-appearing male.  Awake and alert, in no acute distress. Obese.  HEENT: Normocephalic, atraumatic.  LUNGS: Normal respiratory effort.  ABDOMEN: Soft, nontender, nondistended.  No rebound or guarding.  Obese  EXT:  +left leg edema - stable  MSK: Good movement of left knee.  SKIN: No lesions of the left foot.  Slight improvement of redness and warmth of the left lower extremity up to the left mid knee.  There are scabbed lesions of the left knee and the left anterior shin.  No drainage.  NEURO: A&Ox4. No focal deficits.  Face symmetric.  Speech fluent.  Moves all extremities well.   PSYCHIATRIC: Normal insight and judgement.  Cooperative.  Normal affect.    Laboratory Data:    Results from last 7 days   Lab Units 25  1158 " 06/12/25  0744   WBC 10*3/mm3 9.40 10.24  9.87   HEMOGLOBIN g/dL 11.4* 11.9*  11.9*   HEMATOCRIT % 35.5* 36.2*  35.7*   PLATELETS 10*3/mm3 192 190  182     Results from last 7 days   Lab Units 06/13/25  1158   SODIUM mmol/L 134*   POTASSIUM mmol/L 4.3   CHLORIDE mmol/L 102   CO2 mmol/L 24.0   BUN mg/dL 16.6   CREATININE mg/dL 1.07   GLUCOSE mg/dL 346*   CALCIUM mg/dL 8.6     Results from last 7 days   Lab Units 06/12/25  0744   ALK PHOS U/L 73   BILIRUBIN mg/dL 0.3   ALT (SGPT) U/L 16   AST (SGOT) U/L 21         Results from last 7 days   Lab Units 06/12/25  0744   CRP mg/dL 6.09*     Estimated Creatinine Clearance: 102.8 mL/min (by C-G formula based on SCr of 1.07 mg/dL).                    Microbiology:    Microbiology Results (last 10 days)       Procedure Component Value - Date/Time    Wound Culture - Wound, Leg, Left [[id number]] Collected: 06/13/25 1153    Lab Status: Preliminary result Specimen: Wound from Leg, Left Updated: 06/14/25 0812     Wound Culture Moderate growth (3+) Normal Skin Cortney     Gram Stain Few (2+) WBCs seen      Moderate (3+) Gram positive cocci in pairs    Blood Culture - Blood, Hand, Right [[id number]]  (Normal) Collected: 06/12/25 0750    Lab Status: Preliminary result Specimen: Blood from Hand, Right Updated: 06/14/25 0815     Blood Culture No growth at 2 days    Narrative:      Less than seven (7) mL's of blood was collected.  Insufficient quantity may yield false negative results.    Blood Culture - Blood, Hand, Left [[id number]]  (Normal) Collected: 06/12/25 0744    Lab Status: Preliminary result Specimen: Blood from Hand, Left Updated: 06/14/25 0815     Blood Culture No growth at 2 days    Narrative:      Less than seven (7) mL's of blood was collected.  Insufficient quantity may yield false negative results.    MRSA Screen, PCR (Inpatient) - Swab, Nares [[id number]]  (Normal) Collected: 06/12/25 0724    Lab Status: Final result Specimen: Swab from Nares Updated: 06/12/25 6792      MRSA PCR Negative    Narrative:      The negative predictive value of this diagnostic test is high and should only be used to consider de-escalating anti-MRSA therapy. A positive result may indicate colonization with MRSA and must be correlated clinically.  MRSA Negative              Radiology:  CT Lower Extremity Left Without Contrast  Addendum Date: 6/13/2025  ADDENDUM #1 Upon further review and comparison with subsequent radiograph, there appears to be some cortical irregularity along the posterior lateral proximal tibia (sagittal series image 83) suggestive of a subacute to chronic periprosthetic fracture. This may be resulted from knee arthroplasty revision surgery. Electronically Signed: Reagan Carson MD  6/13/2025 5:08 PM EDT  Workstation ID: [id number] ORIGINAL REPORT: CT LOWER EXTREMITY LEFT WO CONTRAST Date of Exam: 6/13/2025 1:27 PM EDT Indication: left leg to knee celluitis, h/o knee arthroplasty. Comparison: Knee radiographs 4/21/2014 Technique: Axial CT images were obtained of the left lower extremity without contrast administration.  Reconstructed coronal and sagittal images were also obtained. Automated exposure control and iterative construction methods were used. Findings: Bones: Postoperative changes of total knee arthroplasty with long stem tibial and femoral components. No evidence of hardware complication. No evidence of fracture. No suspicious osseous lesions and no periosteal reaction or cortical deformity. Evaluation of bowel is limited due to streak artifact from the hardware. Soft tissues: Diffuse subcutaneous edema seen predominantly along the lower leg though there is a small amount in the thigh as well. No soft tissue mass or fluid collection seen. Atrophy of the gastrocnemius muscles. Otherwise muscles and tendons appear grossly intact. Impression: 1.Diffuse subcutaneous edema predominantly along the lower leg as well as a small amount in the thigh as well. No soft tissue mass or  fluid collection seen. 2.Postoperative changes of total knee arthroplasty with long stem tibial and femoral components. No evidence of hardware complication. Electronically Signed: Reagan Carson MD  6/13/2025 2:17 PM EDT  Workstation ID: [id number]    Result Date: 6/13/2025  Impression: 1.Diffuse subcutaneous edema predominantly along the lower leg as well as a small amount in the thigh as well. No soft tissue mass or fluid collection seen. 2.Postoperative changes of total knee arthroplasty with long stem tibial and femoral components. No evidence of hardware complication. Electronically Signed: Reagan Carson MD  6/13/2025 2:17 PM EDT  Workstation ID: [id number]    XR Knee 1 or 2 View Left  Result Date: 6/13/2025  Impression: Postoperative changes of knee arthroplasty with longstem tibial and femoral components. There is some mild irregularity seen along the posterolateral proximal tibia which may reflect a nondisplaced fracture. Electronically Signed: Reagan Carson MD  6/13/2025 5:07 PM EDT  Workstation ID: [id number]          DISCUSSION:  69 y.o. male with history of obesity, uncontrolled diabetes, and remote left knee arthroplasty admitted with left leg cellulitis.      PROBLEM LIST:   -- Left leg cellulitis.  Seems to have started after abrasion on the shin.  Small mild purulence able to be expressed from that wound.  Likely staphylococcal or streptococcal infection.  Wound culture with normal skin nathanael.  Risk to develop left knee periprosthetic joint infection but no effusion or pain.  No fluid collection on CT.  -- History of remote left knee arthroplasty, last in 2013 by Dr. Matias at [facility].  He reports he has had 3 total surgeries on his left knee, the first arthroplasty followed by 2 revisions due to mechanical issues and not infection.  Orthopedics evaluated and low suspicion for PJI as no pain or effusion; avoid aspiration with overlying cellulitis.  -- History of left lower  leg orthopedic hardware, status post removal several years ago.  Details unknown.  -- History of left leg vein procedure.  Duplex this admission negative for DVT.  -- Atrial fibrillation, new onset.  Exacerbated by current infection.  Failed HELLEN, inability to pass probe.  -- Uncontrolled diabetes mellitus type 2.  Contributing to propensity for infection and poor wound healing.    PLAN:  -- Continue daptomycin and cefazolin  -- Follow exam.  Low suspicion for PJI at this point though remains at risk.  Ortho evaluated yesterday.  -- Keep leg elevated  -- Probably need 2-3 more days IV antibiotics then plan to transition to oral linezolid at discharge.    Plan discussed with patient.  Case discussed with Dr. Araujo yesterday.      Carlos Dumont MD  6/14/2025  14:04 EDT

## 2025-06-14 NOTE — PROGRESS NOTES
[facility] Medicine Services  PROGRESS NOTE    Patient Name: Dale Alas  : 1955  MRN: [id number]    Date of Admission: 2025  Primary Care Physician: Guillermo Enciso MD    Subjective   Subjective     CC: LLE pain    HPI:    No complaints, has walked, says LLE is improving, has no chest pain or dyspnea       Objective   Objective     Vital Signs:   Temp:  [97.8 °F (36.6 °C)-99.2 °F (37.3 °C)] 99.2 °F (37.3 °C)  Heart Rate:  [] 62  Resp:  [16-18] 18  BP: (108-129)/(51-75) 110/55     Gen:  in bed, NAD and alert   Neuro: alert and oriented, clear speech, follows commands, grossly nonfocal  HEENT:  NC/AT   Neck:  Supple, no LAD  Heart   irreg irreg.  Tele shows Afib/flutter, rate controlled   Abd:  Soft, nontender  Extrem:  No c/c.  LLE 3+ edema, redness, mod tender at ankle       Results Reviewed:  LAB RESULTS:      Lab 25  1158 25  0744 25  2342   WBC 9.40 10.24  9.87  --    HEMOGLOBIN 11.4* 11.9*  11.9*  --    HEMATOCRIT 35.5* 36.2*  35.7*  --    PLATELETS 192 190  182  --    NEUTROS ABS 7.07* 7.44*  7.16*  --    IMMATURE GRANS (ABS) 0.05 0.05  0.05  --    LYMPHS ABS 1.59 1.89  1.88  --    MONOS ABS 0.57 0.74  0.63  --    EOS ABS 0.09 0.09  0.09  --    MCV 85.5 84.8  83.8  --    CRP  --  6.09*  --    PROCALCITONIN  --  0.71*  --    PROTIME  --   --  9.4   APTT  --  64.0  --    HEPARIN ANTI-XA  --  0.57  --          Lab 25  1158 25  0744 25  2342   SODIUM 134* 135*  --    POTASSIUM 4.3 4.0  --    CHLORIDE 102 102  --    CO2 24.0 23.0  --    ANION GAP 8.0 10.0  --    BUN 16.6 11.1  --    CREATININE 1.07 0.82  --    EGFR 75.6 95.7  --    GLUCOSE 346* 295*  --    CALCIUM 8.6 8.2*  --    MAGNESIUM  --   --  1.7*   HEMOGLOBIN A1C  --  8.90*  --          Lab 25  0744   TOTAL PROTEIN 6.5   ALBUMIN 3.3*   GLOBULIN 3.2   ALT (SGPT) 16   AST (SGOT) 21   BILIRUBIN 0.3   ALK PHOS 73         Lab 25  2342   PROTIME 9.4    INR 0.96                 Brief Urine Lab Results  (Last result in the past 365 days)        Color   Clarity   Blood   Leuk Est   Nitrite   Protein   CREAT   Urine HCG        04/14/25 1301 Yellow   Clear   Trace   Negative   Negative   Negative                   Microbiology Results Abnormal       None            CT Lower Extremity Left Without Contrast  Addendum Date: 6/13/2025  ADDENDUM #1 Upon further review and comparison with subsequent radiograph, there appears to be some cortical irregularity along the posterior lateral proximal tibia (sagittal series image 83) suggestive of a subacute to chronic periprosthetic fracture. This may be resulted from knee arthroplasty revision surgery. Electronically Signed: Reagna Carson MD  6/13/2025 5:08 PM EDT  Workstation ID: [id number] ORIGINAL REPORT: CT LOWER EXTREMITY LEFT WO CONTRAST Date of Exam: 6/13/2025 1:27 PM EDT Indication: left leg to knee celluitis, h/o knee arthroplasty. Comparison: Knee radiographs 4/21/2014 Technique: Axial CT images were obtained of the left lower extremity without contrast administration.  Reconstructed coronal and sagittal images were also obtained. Automated exposure control and iterative construction methods were used. Findings: Bones: Postoperative changes of total knee arthroplasty with long stem tibial and femoral components. No evidence of hardware complication. No evidence of fracture. No suspicious osseous lesions and no periosteal reaction or cortical deformity. Evaluation of bowel is limited due to streak artifact from the hardware. Soft tissues: Diffuse subcutaneous edema seen predominantly along the lower leg though there is a small amount in the thigh as well. No soft tissue mass or fluid collection seen. Atrophy of the gastrocnemius muscles. Otherwise muscles and tendons appear grossly intact. Impression: 1.Diffuse subcutaneous edema predominantly along the lower leg as well as a small amount in the thigh as well. No soft  tissue mass or fluid collection seen. 2.Postoperative changes of total knee arthroplasty with long stem tibial and femoral components. No evidence of hardware complication. Electronically Signed: Reagan Carson MD  6/13/2025 2:17 PM EDT  Workstation ID: [id number]    Result Date: 6/13/2025  CT LOWER EXTREMITY LEFT WO CONTRAST Date of Exam: 6/13/2025 1:27 PM EDT Indication: left leg to knee celluitis, h/o knee arthroplasty. Comparison: Knee radiographs 4/21/2014 Technique: Axial CT images were obtained of the left lower extremity without contrast administration.  Reconstructed coronal and sagittal images were also obtained. Automated exposure control and iterative construction methods were used. Findings: Bones: Postoperative changes of total knee arthroplasty with long stem tibial and femoral components. No evidence of hardware complication. No evidence of fracture. No suspicious osseous lesions and no periosteal reaction or cortical deformity. Evaluation of bowel is limited due to streak artifact from the hardware. Soft tissues: Diffuse subcutaneous edema seen predominantly along the lower leg though there is a small amount in the thigh as well. No soft tissue mass or fluid collection seen. Atrophy of the gastrocnemius muscles. Otherwise muscles and tendons appear grossly intact.     Impression: Impression: 1.Diffuse subcutaneous edema predominantly along the lower leg as well as a small amount in the thigh as well. No soft tissue mass or fluid collection seen. 2.Postoperative changes of total knee arthroplasty with long stem tibial and femoral components. No evidence of hardware complication. Electronically Signed: Reagan Carson MD  6/13/2025 2:17 PM EDT  Workstation ID: [id number]    XR Knee 1 or 2 View Left  Result Date: 6/13/2025  XR KNEE 1 OR 2 VW LEFT Date of Exam: 6/13/2025 4:29 PM EDT Indication: evaluate total knee arthroplasty Comparison: Same day CT lower extremity Findings: Postoperative changes of  knee arthroplasty with longstem tibial and femoral component. There is some mild irregularity seen along the posterolateral proximal tibia. Alignment appears intact otherwise. No substantial joint effusion. No periprosthetic lucency.     Impression: Impression: Postoperative changes of knee arthroplasty with longstem tibial and femoral components. There is some mild irregularity seen along the posterolateral proximal tibia which may reflect a nondisplaced fracture. Electronically Signed: Reagan Carson MD  6/13/2025 5:07 PM EDT  Workstation ID: [id number]    Duplex Venous Lower Extremity - Bilateral CAR  Result Date: 6/12/2025    The patient is status post bilateral endovascular laser treatment of greater saphenous veins which are occluded and not well-visualized.  Otherwise normal bilateral lower extremity venous duplex scan.       Results for orders placed during the hospital encounter of 06/12/25    Adult Transesophageal Echo (HELLEN) W/ Cont if Necessary Per Protocol    Interpretation Summary    HELLEN was attempted and was unsuccessful due to failure to advance the probe.      Current medications:  Scheduled Meds:apixaban, 5 mg, Oral, Q12H  aspirin, 81 mg, Oral, Daily  carbidopa-levodopa, 1 tablet, Oral, Nightly  ceFAZolin, 2,000 mg, Intravenous, Q8H  DAPTOmycin, 4 mg/kg (Adjusted), Intravenous, Q24H  finasteride, 5 mg, Oral, Daily  folic acid, 1 mg, Oral, Daily  insulin glargine, 15 Units, Subcutaneous, Daily  insulin lispro, 2-9 Units, Subcutaneous, 4x Daily AC & at Bedtime  metoprolol tartrate, 100 mg, Oral, BID  pravastatin, 80 mg, Oral, Daily  sodium chloride, 10 mL, Intravenous, Q12H  tamsulosin, 0.4 mg, Oral, Daily      Continuous Infusions:dilTIAZem, 5-15 mg/hr, Last Rate: 10 mg/hr (06/14/25 0353)      PRN Meds:.  senna-docusate sodium **AND** polyethylene glycol **AND** bisacodyl **AND** bisacodyl    Calcium Replacement - Follow Nurse / BPA Driven Protocol    dextrose    dextrose    etomidate    etomidate     fentaNYL citrate (PF)    flumazenil    glucagon (human recombinant)    Magnesium Standard Dose Replacement - Follow Nurse / BPA Driven Protocol    midazolam    naloxone    nitroglycerin    ondansetron ODT **OR** ondansetron    Phosphorus Replacement - Follow Nurse / BPA Driven Protocol    Potassium Replacement - Follow Nurse / BPA Driven Protocol    sodium chloride    sodium chloride    Assessment & Plan   Assessment & Plan     Active Hospital Problems    Diagnosis  POA    **Atrial fibrillation [I48.91]  Yes    Primary hypertension [I10]  Unknown    Prediabetes [R73.03]  Unknown    Other hyperlipidemia [E78.49]  Unknown    RLS (restless legs syndrome) [G25.81]  Unknown    Cellulitis of left lower extremity [L03.116]  Unknown      Resolved Hospital Problems   No resolved problems to display.        Brief Hospital Course to date:  Dale Alas is a 69 y.o. male w history of HTN MAREN, here with chills and dyspnea for a week, newly diagnosed Afib, and new leg swelling and LLE cellulitis    All problems are new to me     Dyspnea  New onset atrial fibrillation  -no PE on CTA  -on Eliquis  -rate control with BB per cardiology  -ECHO pending  - FU with Dr Camacho in 6 weeks     LLE cellulitis  In setting of prosthetic knee   -MRSA PCR negative   -Rocephin  -ID consult:  IV abx ongoing   - Dr Houston does not think arthrocentesis necessary    DM A1C 8.9   -SSI  - on metformin alone at home.  Added glargine; likely DC on basal insulin  - add SGLT2i    RLS  -on sinemet    HTN  -monitor        Expected Discharge Location and Transportation: Home  Expected Discharge TBD  Expected discharge date/ time has not been documented.     VTE Prophylaxis:  Pharmacologic & mechanical VTE prophylaxis orders are present.         AM-PAC 6 Clicks Score (PT): 18 (06/14/25 0500)    CODE STATUS:   Code Status and Medical Interventions: CPR (Attempt to Resuscitate); Full Support   Ordered at: 06/12/25 0642     Code Status (Patient has no pulse  and is not breathing):    CPR (Attempt to Resuscitate)     Medical Interventions (Patient has pulse or is breathing):    Full Support       Gerri Greenfield MD  06/14/25

## 2025-06-14 NOTE — PLAN OF CARE
Problem: Adult Inpatient Plan of Care  Goal: Plan of Care Review  Outcome: Progressing  Goal: Patient-Specific Goal (Individualized)  Outcome: Progressing  Goal: Absence of Hospital-Acquired Illness or Injury  Outcome: Progressing  Intervention: Identify and Manage Fall Risk  Recent Flowsheet Documentation  Taken 6/14/2025 1600 by Nery Banks RN  Safety Promotion/Fall Prevention:   activity supervised   safety round/check completed  Taken 6/14/2025 1400 by Nery Banks RN  Safety Promotion/Fall Prevention:   activity supervised   safety round/check completed  Taken 6/14/2025 1200 by Nery Banks RN  Safety Promotion/Fall Prevention:   activity supervised   safety round/check completed  Taken 6/14/2025 1000 by Nery Banks RN  Safety Promotion/Fall Prevention:   activity supervised   safety round/check completed  Taken 6/14/2025 0854 by Nery Banks RN  Safety Promotion/Fall Prevention:   activity supervised   safety round/check completed  Intervention: Prevent Skin Injury  Recent Flowsheet Documentation  Taken 6/14/2025 1600 by Nery Banks RN  Body Position: sitting up in bed  Taken 6/14/2025 1400 by Nery Banks RN  Body Position: position changed independently  Skin Protection:   transparent dressing maintained   silicone foam dressing in place   incontinence pads utilized  Taken 6/14/2025 1200 by Nery Banks RN  Body Position: dangle, side of bed  Taken 6/14/2025 1000 by Nery Banks RN  Body Position: dangle, side of bed  Taken 6/14/2025 0854 by Nery Banks RN  Body Position: sitting up in bed  Intervention: Prevent and Manage VTE (Venous Thromboembolism) Risk  Recent Flowsheet Documentation  Taken 6/14/2025 0854 by Nery Banks RN  VTE Prevention/Management: (See MAR) other (see comments)  Goal: Optimal Comfort and Wellbeing  Outcome: Progressing  Intervention: Provide Person-Centered Care  Recent Flowsheet Documentation  Taken 6/14/2025 0854 by  Nery Banks RN  Trust Relationship/Rapport:   care explained   choices provided  Goal: Readiness for Transition of Care  Outcome: Progressing     Problem: Comorbidity Management  Goal: Blood Glucose Level Within Target Range  Outcome: Progressing  Intervention: Monitor and Manage Glycemia  Recent Flowsheet Documentation  Taken 6/14/2025 1600 by Nery Banks RN  Medication Review/Management: medications reviewed  Taken 6/14/2025 1400 by Nery Banks RN  Medication Review/Management: medications reviewed  Taken 6/14/2025 1200 by Nery Banks RN  Medication Review/Management: medications reviewed  Taken 6/14/2025 1000 by Nery Banks RN  Medication Review/Management: medications reviewed  Taken 6/14/2025 0854 by Nery Banks RN  Medication Review/Management: medications reviewed  Goal: Blood Pressure in Desired Range  Outcome: Progressing  Intervention: Maintain Blood Pressure Management  Recent Flowsheet Documentation  Taken 6/14/2025 1600 by Nery Banks RN  Medication Review/Management: medications reviewed  Taken 6/14/2025 1400 by Nery Banks RN  Medication Review/Management: medications reviewed  Taken 6/14/2025 1200 by Nery Banks RN  Medication Review/Management: medications reviewed  Taken 6/14/2025 1000 by Nery Banks RN  Medication Review/Management: medications reviewed  Taken 6/14/2025 0854 by Nery Banks RN  Medication Review/Management: medications reviewed     Problem: Skin Injury Risk Increased  Goal: Skin Health and Integrity  Outcome: Progressing  Intervention: Optimize Skin Protection  Recent Flowsheet Documentation  Taken 6/14/2025 1600 by Nery Banks RN  Activity Management: activity encouraged  Head of Bed (HOB) Positioning: HOB elevated  Taken 6/14/2025 1400 by Nery Banks RN  Activity Management: up in chair  Pressure Reduction Techniques:   frequent weight shift encouraged   heels elevated off bed   positioned off  wounds  Head of Bed (HOB) Positioning: HOB elevated  Pressure Reduction Devices:   heel offloading device utilized   positioning supports utilized   pressure-redistributing mattress utilized  Skin Protection:   transparent dressing maintained   silicone foam dressing in place   incontinence pads utilized  Taken 6/14/2025 1200 by Nery Banks, RN  Activity Management: activity encouraged  Head of Bed (HOB) Positioning: HOB elevated  Taken 6/14/2025 1000 by Nery Banks, RN  Activity Management: activity encouraged  Head of Bed (HOB) Positioning: HOB elevated  Taken 6/14/2025 0854 by Nery Banks, RN  Activity Management: activity encouraged  Head of Bed (HOB) Positioning: HOB elevated   Goal Outcome Evaluation:            -VSS. RA/CPAP @ night.  -No complaints of pain during the shift.  -IV abx infused per order.  -Cardizem gtt infusing. Pt in a-fib and rate is controlled.  -Pt up in chair during the shift and ambulated into hallway.

## 2025-06-14 NOTE — PLAN OF CARE
Problem: Adult Inpatient Plan of Care  Goal: Absence of Hospital-Acquired Illness or Injury  Intervention: Identify and Manage Fall Risk  Recent Flowsheet Documentation  Taken 6/14/2025 0200 by Carlos Traylor, RN  Safety Promotion/Fall Prevention:   nonskid shoes/slippers when out of bed   safety round/check completed  Taken 6/14/2025 0000 by Carlos Traylor, RN  Safety Promotion/Fall Prevention:   nonskid shoes/slippers when out of bed   safety round/check completed  Taken 6/13/2025 2200 by Carlos Traylor, RN  Safety Promotion/Fall Prevention:   nonskid shoes/slippers when out of bed   safety round/check completed   Goal Outcome Evaluation:

## 2025-06-15 ENCOUNTER — APPOINTMENT (OUTPATIENT)
Dept: CARDIOLOGY | Facility: HOSPITAL | Age: 70
End: 2025-06-15
Payer: MEDICARE

## 2025-06-15 LAB
AORTIC DIMENSIONLESS INDEX: 0.98 (DI)
AV MEAN PRESS GRAD SYS DOP V1V2: 4 MMHG
AV VMAX SYS DOP: 127 CM/SEC
BACTERIA SPEC AEROBE CULT: NORMAL
BH CV ECHO MEAS - AO MAX PG: 6.5 MMHG
BH CV ECHO MEAS - AO ROOT DIAM: 3.3 CM
BH CV ECHO MEAS - AO V2 VTI: 24.1 CM
BH CV ECHO MEAS - AVA(I,D): 3.7 CM2
BH CV ECHO MEAS - EDV(CUBED): 74.1 ML
BH CV ECHO MEAS - EDV(MOD-SP2): 97.8 ML
BH CV ECHO MEAS - EDV(MOD-SP4): 105 ML
BH CV ECHO MEAS - EF(MOD-SP2): 71.1 %
BH CV ECHO MEAS - EF(MOD-SP4): 68.6 %
BH CV ECHO MEAS - ESV(CUBED): 27 ML
BH CV ECHO MEAS - ESV(MOD-SP2): 28.3 ML
BH CV ECHO MEAS - ESV(MOD-SP4): 33 ML
BH CV ECHO MEAS - FS: 28.6 %
BH CV ECHO MEAS - IVS/LVPW: 1.33 CM
BH CV ECHO MEAS - IVSD: 1.2 CM
BH CV ECHO MEAS - LA DIMENSION: 4.1 CM
BH CV ECHO MEAS - LAT PEAK E' VEL: 14.6 CM/SEC
BH CV ECHO MEAS - LV DIASTOLIC VOL/BSA (35-75): 615.1 CM2
BH CV ECHO MEAS - LV MASS(C)D: 147 GRAMS
BH CV ECHO MEAS - LV MAX PG: 7.1 MMHG
BH CV ECHO MEAS - LV MEAN PG: 4 MMHG
BH CV ECHO MEAS - LV SYSTOLIC VOL/BSA (12-30): 193.3 CM2
BH CV ECHO MEAS - LV V1 MAX: 133 CM/SEC
BH CV ECHO MEAS - LV V1 VTI: 23.7 CM
BH CV ECHO MEAS - LVIDD: 4.2 CM
BH CV ECHO MEAS - LVIDS: 3 CM
BH CV ECHO MEAS - LVOT AREA: 3.8 CM2
BH CV ECHO MEAS - LVOT DIAM: 2.2 CM
BH CV ECHO MEAS - LVPWD: 0.9 CM
BH CV ECHO MEAS - MED PEAK E' VEL: 7.7 CM/SEC
BH CV ECHO MEAS - MV A MAX VEL: 12.3 CM/SEC
BH CV ECHO MEAS - MV DEC SLOPE: 430 CM/SEC2
BH CV ECHO MEAS - MV DEC TIME: 0.24 SEC
BH CV ECHO MEAS - MV E MAX VEL: 102 CM/SEC
BH CV ECHO MEAS - MV E/A: 8.3
BH CV ECHO MEAS - MV MAX PG: 5 MMHG
BH CV ECHO MEAS - MV MEAN PG: 2 MMHG
BH CV ECHO MEAS - MV V2 VTI: 23.9 CM
BH CV ECHO MEAS - MVA(VTI): 3.8 CM2
BH CV ECHO MEAS - PA ACC TIME: 0.1 SEC
BH CV ECHO MEAS - PA V2 MAX: 116 CM/SEC
BH CV ECHO MEAS - SV(LVOT): 90.1 ML
BH CV ECHO MEAS - SV(MOD-SP2): 69.5 ML
BH CV ECHO MEAS - SV(MOD-SP4): 72 ML
BH CV ECHO MEAS - SVI(LVOT): 527.8 ML/M2
BH CV ECHO MEAS - SVI(MOD-SP2): 407.1 ML/M2
BH CV ECHO MEAS - SVI(MOD-SP4): 421.8 ML/M2
BH CV ECHO MEAS - TAPSE (>1.6): 2.7 CM
BH CV ECHO MEAS - TR MAX PG: 20.8 MMHG
BH CV ECHO MEAS - TR MAX VEL: 228 CM/SEC
BH CV ECHO MEASUREMENTS AVERAGE E/E' RATIO: 9.15
BH CV XLRA - RV BASE: 2.9 CM
BH CV XLRA - RV LENGTH: 7.1 CM
BH CV XLRA - RV MID: 1.9 CM
BH CV XLRA - TDI S': 11.9 CM/SEC
DEPRECATED RDW RBC AUTO: 40.6 FL (ref 37–54)
ERYTHROCYTE [DISTWIDTH] IN BLOOD BY AUTOMATED COUNT: 13.2 % (ref 12.3–15.4)
GLUCOSE BLDC GLUCOMTR-MCNC: 166 MG/DL (ref 70–130)
GLUCOSE BLDC GLUCOMTR-MCNC: 205 MG/DL (ref 70–130)
GLUCOSE BLDC GLUCOMTR-MCNC: 218 MG/DL (ref 70–130)
GLUCOSE BLDC GLUCOMTR-MCNC: 325 MG/DL (ref 70–130)
GRAM STN SPEC: NORMAL
GRAM STN SPEC: NORMAL
HCT VFR BLD AUTO: 38.8 % (ref 37.5–51)
HGB BLD-MCNC: 12.8 G/DL (ref 13–17.7)
IVRT: 92 MS
LV EF BIPLANE MOD: 69.8 %
MCH RBC QN AUTO: 27.9 PG (ref 26.6–33)
MCHC RBC AUTO-ENTMCNC: 33 G/DL (ref 31.5–35.7)
MCV RBC AUTO: 84.7 FL (ref 79–97)
PLATELET # BLD AUTO: 201 10*3/MM3 (ref 140–450)
PMV BLD AUTO: 9.4 FL (ref 6–12)
RBC # BLD AUTO: 4.58 10*6/MM3 (ref 4.14–5.8)
WBC NRBC COR # BLD AUTO: 9.1 10*3/MM3 (ref 3.4–10.8)

## 2025-06-15 PROCEDURE — 25010000002 DAPTOMYCIN PER 1 MG: Performed by: INTERNAL MEDICINE

## 2025-06-15 PROCEDURE — 85027 COMPLETE CBC AUTOMATED: CPT | Performed by: HOSPITALIST

## 2025-06-15 PROCEDURE — 25010000002 CEFAZOLIN PER 500 MG: Performed by: INTERNAL MEDICINE

## 2025-06-15 PROCEDURE — 99232 SBSQ HOSP IP/OBS MODERATE 35: CPT | Performed by: INTERNAL MEDICINE

## 2025-06-15 PROCEDURE — 93306 TTE W/DOPPLER COMPLETE: CPT

## 2025-06-15 PROCEDURE — 82948 REAGENT STRIP/BLOOD GLUCOSE: CPT

## 2025-06-15 PROCEDURE — 63710000001 INSULIN LISPRO (HUMAN) PER 5 UNITS: Performed by: HOSPITALIST

## 2025-06-15 PROCEDURE — 63710000001 INSULIN GLARGINE PER 5 UNITS: Performed by: INTERNAL MEDICINE

## 2025-06-15 PROCEDURE — 93306 TTE W/DOPPLER COMPLETE: CPT | Performed by: INTERNAL MEDICINE

## 2025-06-15 PROCEDURE — 25010000002 SULFUR HEXAFLUORIDE MICROSPH 60.7-25 MG RECONSTITUTED SUSPENSION: Performed by: INTERNAL MEDICINE

## 2025-06-15 RX ADMIN — SODIUM CHLORIDE 2000 MG: 900 INJECTION INTRAVENOUS at 13:17

## 2025-06-15 RX ADMIN — EMPAGLIFLOZIN 10 MG: 10 TABLET, FILM COATED ORAL at 10:28

## 2025-06-15 RX ADMIN — ASPIRIN 81 MG: 81 TABLET, COATED ORAL at 10:28

## 2025-06-15 RX ADMIN — Medication 10 MG/HR: at 08:04

## 2025-06-15 RX ADMIN — METOPROLOL TARTRATE 100 MG: 100 TABLET, FILM COATED ORAL at 10:29

## 2025-06-15 RX ADMIN — INSULIN GLARGINE 25 UNITS: 100 INJECTION, SOLUTION SUBCUTANEOUS at 10:28

## 2025-06-15 RX ADMIN — INSULIN LISPRO 8 UNITS: 100 INJECTION, SOLUTION INTRAVENOUS; SUBCUTANEOUS at 21:27

## 2025-06-15 RX ADMIN — PRAVASTATIN SODIUM 80 MG: 40 TABLET ORAL at 10:29

## 2025-06-15 RX ADMIN — APIXABAN 5 MG: 5 TABLET, FILM COATED ORAL at 10:29

## 2025-06-15 RX ADMIN — SODIUM CHLORIDE 2000 MG: 900 INJECTION INTRAVENOUS at 04:56

## 2025-06-15 RX ADMIN — METOPROLOL TARTRATE 100 MG: 100 TABLET, FILM COATED ORAL at 21:09

## 2025-06-15 RX ADMIN — APIXABAN 5 MG: 5 TABLET, FILM COATED ORAL at 21:09

## 2025-06-15 RX ADMIN — INSULIN LISPRO 4 UNITS: 100 INJECTION, SOLUTION INTRAVENOUS; SUBCUTANEOUS at 17:30

## 2025-06-15 RX ADMIN — CARBIDOPA AND LEVODOPA 1 TABLET: 25; 100 TABLET ORAL at 21:09

## 2025-06-15 RX ADMIN — INSULIN LISPRO 2 UNITS: 100 INJECTION, SOLUTION INTRAVENOUS; SUBCUTANEOUS at 10:27

## 2025-06-15 RX ADMIN — FOLIC ACID 1 MG: 1 TABLET ORAL at 10:29

## 2025-06-15 RX ADMIN — DAPTOMYCIN 450 MG: 500 INJECTION, POWDER, LYOPHILIZED, FOR SOLUTION INTRAVENOUS at 13:17

## 2025-06-15 RX ADMIN — SULFUR HEXAFLUORIDE 2 ML: KIT at 09:16

## 2025-06-15 RX ADMIN — Medication 10 ML: at 10:30

## 2025-06-15 RX ADMIN — FINASTERIDE 5 MG: 5 TABLET, FILM COATED ORAL at 10:28

## 2025-06-15 RX ADMIN — SODIUM CHLORIDE 2000 MG: 900 INJECTION INTRAVENOUS at 21:09

## 2025-06-15 RX ADMIN — INSULIN LISPRO 4 UNITS: 100 INJECTION, SOLUTION INTRAVENOUS; SUBCUTANEOUS at 13:16

## 2025-06-15 RX ADMIN — TAMSULOSIN HYDROCHLORIDE 0.4 MG: 0.4 CAPSULE ORAL at 10:28

## 2025-06-15 NOTE — PLAN OF CARE
Problem: Adult Inpatient Plan of Care  Goal: Plan of Care Review  Outcome: Progressing  Flowsheets (Taken 6/15/2025 1942)  Outcome Evaluation: VSS, RA when awake. Afib rate controlled on Cardizem gtt. BP wdl. Pt reports he feels L leg is less swollen than yesterday. Reports pain only when leg touch or ambulation. Up with 1 staff, gait belt and walker.  Goal: Patient-Specific Goal (Individualized)  Outcome: Progressing  Goal: Absence of Hospital-Acquired Illness or Injury  Outcome: Progressing  Intervention: Identify and Manage Fall Risk  Recent Flowsheet Documentation  Taken 6/15/2025 1800 by Neela Mueller RN  Safety Promotion/Fall Prevention:   activity supervised   fall prevention program maintained   nonskid shoes/slippers when out of bed   safety round/check completed  Taken 6/15/2025 1600 by Neela Mueller RN  Safety Promotion/Fall Prevention:   activity supervised   fall prevention program maintained   nonskid shoes/slippers when out of bed   safety round/check completed  Taken 6/15/2025 1400 by Neela Mueller RN  Safety Promotion/Fall Prevention:   activity supervised   fall prevention program maintained   nonskid shoes/slippers when out of bed   safety round/check completed  Taken 6/15/2025 1200 by Neela Mueller RN  Safety Promotion/Fall Prevention:   activity supervised   fall prevention program maintained   nonskid shoes/slippers when out of bed  Taken 6/15/2025 1000 by Neela Mueller RN  Safety Promotion/Fall Prevention:   activity supervised   fall prevention program maintained   nonskid shoes/slippers when out of bed   safety round/check completed  Taken 6/15/2025 0800 by Neela Mueller RN  Safety Promotion/Fall Prevention:   activity supervised   fall prevention program maintained   nonskid shoes/slippers when out of bed   safety round/check completed  Intervention: Prevent Skin Injury  Recent Flowsheet Documentation  Taken 6/15/2025 1800 by eNela Mueller RN  Body Position: position changed  independently  Taken 6/15/2025 1600 by Neela Mueller RN  Body Position: position changed independently  Taken 6/15/2025 1400 by Neela Mueller RN  Body Position: position changed independently  Taken 6/15/2025 1200 by Neela Mueller RN  Body Position: position changed independently  Taken 6/15/2025 1000 by Neela Mueller RN  Body Position: position changed independently  Taken 6/15/2025 0800 by Neela Mueller RN  Body Position: position changed independently  Intervention: Prevent and Manage VTE (Venous Thromboembolism) Risk  Recent Flowsheet Documentation  Taken 6/15/2025 0800 by Neela Mueller RN  VTE Prevention/Management: (eliquis) other (see comments)  Goal: Optimal Comfort and Wellbeing  Outcome: Progressing  Intervention: Monitor Pain and Promote Comfort  Recent Flowsheet Documentation  Taken 6/15/2025 1400 by Neela Mueller RN  Pain Management Interventions: pain management plan reviewed with patient/caregiver  Taken 6/15/2025 1200 by Neela Mueller RN  Pain Management Interventions: pain management plan reviewed with patient/caregiver  Taken 6/15/2025 1000 by Neela Mueller RN  Pain Management Interventions: pain management plan reviewed with patient/caregiver  Taken 6/15/2025 0800 by Neela Mueller RN  Pain Management Interventions: pain management plan reviewed with patient/caregiver  Intervention: Provide Person-Centered Care  Recent Flowsheet Documentation  Taken 6/15/2025 0800 by Neela Mueller RN  Trust Relationship/Rapport:   care explained   thoughts/feelings acknowledged  Goal: Readiness for Transition of Care  Outcome: Progressing   Goal Outcome Evaluation:              Outcome Evaluation: VSS, RA when awake. Afib rate controlled on Cardizem gtt. BP wdl. Pt reports he feels L leg is less swollen than yesterday. Reports pain only when leg touch or ambulation. Up with 1 staff, gait belt and walker.

## 2025-06-15 NOTE — PROGRESS NOTES
"  INFECTIOUS DISEASES  INPATIENT PROGRESS NOTE  6/15/2025      PATIENT NAME: Dale Alas  :  1955  MRN:  [id number]  Date of Admission:  2025      Antimicrobials:  ceFAZolin 2000 mg IVPB in 100 mL NS (MBP)  DAPTOmycin (CUBICIN)  IV      MAR reviewed.     Reason for consultation: Left leg cellulitis    Interval history: Patient reports improved swelling in leg but ongoing pressure in calf and ankle.  No drainage.  No fevers.  No pain in knee.  Tolerating abx.  Remains in Afib on diltiazem drip.  Getting TTE this morning.    ROS:  No fevers/chills overnight.  No new rashes.  No SOB or chest pain.  No nausea/vomiting/diarrhea.  Denies side effects from antimicrobials.     Objective:  Temp (24hrs), Av °F (37.2 °C), Min:98.4 °F (36.9 °C), Max:100 °F (37.8 °C)    /75   Pulse 114   Temp 98.4 °F (36.9 °C) (Axillary)   Resp 18   Ht 189.2 cm (74.49\")   Wt (!) 149 kg (328 lb 7.8 oz)   SpO2 95%   BMI 41.62 kg/m²     Physical Examination:  GENERAL: Improved appearing male.  Awake and alert, in no acute distress. Obese.  HEENT: Normocephalic, atraumatic.  LUNGS: Normal respiratory effort.  ABDOMEN: Soft, nontender, nondistended.  No rebound or guarding.  Obese  EXT:  +left leg edema - improving  MSK: Good movement of left knee without pain.  SKIN: No lesions of the left foot.  Ongoing improvement of redness and warmth of the left lower extremity up to the left mid knee.  Scabbed lesions of the left knee and the left anterior shin are healing.  No drainage.  NEURO: A&Ox4. No focal deficits.  Face symmetric.  Speech fluent.  Moves all extremities well.   PSYCHIATRIC: Normal insight and judgement.  Cooperative.  Normal affect.    Laboratory Data:    Results from last 7 days   Lab Units 25  1158 25  0744   WBC 10*3/mm3 9.40 10.24  9.87   HEMOGLOBIN g/dL 11.4* 11.9*  11.9*   HEMATOCRIT % 35.5* 36.2*  35.7*   PLATELETS 10*3/mm3 192 190  182     Results from last 7 days   Lab Units " 06/14/25  1703   SODIUM mmol/L 136   POTASSIUM mmol/L 4.5   CHLORIDE mmol/L 102   CO2 mmol/L 24.0   BUN mg/dL 13.1   CREATININE mg/dL 1.10   GLUCOSE mg/dL 217*   CALCIUM mg/dL 8.9     Results from last 7 days   Lab Units 06/14/25  1703   ALK PHOS U/L 84   BILIRUBIN mg/dL 0.3   ALT (SGPT) U/L 29   AST (SGOT) U/L 32         Results from last 7 days   Lab Units 06/12/25  0744   CRP mg/dL 6.09*     Estimated Creatinine Clearance: 100 mL/min (by C-G formula based on SCr of 1.1 mg/dL).      Results from last 7 days   Lab Units 06/14/25  1703   CK TOTAL U/L 90               Microbiology:    Microbiology Results (last 10 days)       Procedure Component Value - Date/Time    Wound Culture - Wound, Leg, Left [[id number]] Collected: 06/13/25 1153    Lab Status: Final result Specimen: Wound from Leg, Left Updated: 06/15/25 0803     Wound Culture Moderate growth (3+) Normal Skin Cortney     Gram Stain Few (2+) WBCs seen      Moderate (3+) Gram positive cocci in pairs    Blood Culture - Blood, Hand, Right [[id number]]  (Normal) Collected: 06/12/25 0750    Lab Status: Preliminary result Specimen: Blood from Hand, Right Updated: 06/15/25 0816     Blood Culture No growth at 3 days    Narrative:      Less than seven (7) mL's of blood was collected.  Insufficient quantity may yield false negative results.    Blood Culture - Blood, Hand, Left [[id number]]  (Normal) Collected: 06/12/25 0744    Lab Status: Preliminary result Specimen: Blood from Hand, Left Updated: 06/15/25 0816     Blood Culture No growth at 3 days    Narrative:      Less than seven (7) mL's of blood was collected.  Insufficient quantity may yield false negative results.    MRSA Screen, PCR (Inpatient) - Swab, Nares [[id number]]  (Normal) Collected: 06/12/25 0724    Lab Status: Final result Specimen: Swab from Nares Updated: 06/12/25 0858     MRSA PCR Negative    Narrative:      The negative predictive value of this diagnostic test is high and should only be used to consider  de-escalating anti-MRSA therapy. A positive result may indicate colonization with MRSA and must be correlated clinically.  MRSA Negative              Radiology:  CT Lower Extremity Left Without Contrast  Addendum Date: 6/13/2025  ADDENDUM #1 Upon further review and comparison with subsequent radiograph, there appears to be some cortical irregularity along the posterior lateral proximal tibia (sagittal series image 83) suggestive of a subacute to chronic periprosthetic fracture. This may be resulted from knee arthroplasty revision surgery. Electronically Signed: Reagan Carson MD  6/13/2025 5:08 PM EDT  Workstation ID: [id number] ORIGINAL REPORT: CT LOWER EXTREMITY LEFT WO CONTRAST Date of Exam: 6/13/2025 1:27 PM EDT Indication: left leg to knee celluitis, h/o knee arthroplasty. Comparison: Knee radiographs 4/21/2014 Technique: Axial CT images were obtained of the left lower extremity without contrast administration.  Reconstructed coronal and sagittal images were also obtained. Automated exposure control and iterative construction methods were used. Findings: Bones: Postoperative changes of total knee arthroplasty with long stem tibial and femoral components. No evidence of hardware complication. No evidence of fracture. No suspicious osseous lesions and no periosteal reaction or cortical deformity. Evaluation of bowel is limited due to streak artifact from the hardware. Soft tissues: Diffuse subcutaneous edema seen predominantly along the lower leg though there is a small amount in the thigh as well. No soft tissue mass or fluid collection seen. Atrophy of the gastrocnemius muscles. Otherwise muscles and tendons appear grossly intact. Impression: 1.Diffuse subcutaneous edema predominantly along the lower leg as well as a small amount in the thigh as well. No soft tissue mass or fluid collection seen. 2.Postoperative changes of total knee arthroplasty with long stem tibial and femoral components. No evidence of  hardware complication. Electronically Signed: Reagan Carson MD  6/13/2025 2:17 PM EDT  Workstation ID: [id number]    Result Date: 6/13/2025  Impression: 1.Diffuse subcutaneous edema predominantly along the lower leg as well as a small amount in the thigh as well. No soft tissue mass or fluid collection seen. 2.Postoperative changes of total knee arthroplasty with long stem tibial and femoral components. No evidence of hardware complication. Electronically Signed: Reagan Carson MD  6/13/2025 2:17 PM EDT  Workstation ID: [id number]    XR Knee 1 or 2 View Left  Result Date: 6/13/2025  Impression: Postoperative changes of knee arthroplasty with longstem tibial and femoral components. There is some mild irregularity seen along the posterolateral proximal tibia which may reflect a nondisplaced fracture. Electronically Signed: Reagan Carson MD  6/13/2025 5:07 PM EDT  Workstation ID: [id number]          DISCUSSION:  69 y.o. male with history of obesity, uncontrolled diabetes, and remote left knee arthroplasty admitted with left leg cellulitis.      PROBLEM LIST:   -- Left leg cellulitis.  Seems to have started after abrasion on the shin.  Small mild purulence able to be expressed from that wound.  Likely staphylococcal or streptococcal infection.  Wound culture with normal skin nathanael.  Risk to develop left knee periprosthetic joint infection but no effusion or pain.  No fluid collection on CT.  -- History of remote left knee arthroplasty, last in 2013 by Dr. Matias at [facility].  He reports he has had 3 total surgeries on his left knee, the first arthroplasty followed by 2 revisions due to mechanical issues and not infection.  Orthopedics evaluated and low suspicion for PJI as no pain or effusion; avoid aspiration with overlying cellulitis.  -- History of left lower leg orthopedic hardware, status post removal several years ago.  Details unknown.  -- History of left leg vein procedure.  Duplex this  admission negative for DVT.  -- Atrial fibrillation, new onset.  Exacerbated by current infection.  Failed HELLEN, inability to pass probe.  -- Uncontrolled diabetes mellitus type 2.  Contributing to propensity for infection and poor wound healing.    PLAN:  -- Continue daptomycin and cefazolin  -- Follow exam.  Continues to improve.  Low suspicion for PJI but continue to monitor knee.  -- Keep leg elevated  -- Expect around 2 more days IV antibiotics then plan to transition to oral linezolid at discharge.    Plan discussed with patient.    Carlos Dumont MD  6/15/2025  09:40 EDT

## 2025-06-15 NOTE — PROGRESS NOTES
[facility] Medicine Services  PROGRESS NOTE    Patient Name: Dale Alas  : 1955  MRN: [id number]    Date of Admission: 2025  Primary Care Physician: Guillermo Enciso MD    Subjective   Subjective     CC: LLE pain    HPI:    leg is a little better, still swollen, less red.  No new issues.       Objective   Objective     Vital Signs:   Temp:  [98.6 °F (37 °C)-100 °F (37.8 °C)] 99.5 °F (37.5 °C)  Heart Rate:  [67-99] 77  Resp:  [18] 18  BP: (113-164)/() 150/103     Gen:  in bed, NAD and alert   Neuro: alert and oriented, clear speech, follows commands, grossly nonfocal  HEENT:  NC/AT   Neck:  Supple, no LAD  Heart   irreg irreg.  Tele shows Afib/flutter, rate controlled   Abd:  Soft, nontender  Extrem:  No c/c.  LLE 3+ edema, redness, mod tender at ankle .  Sl improved from yest       Results Reviewed:  LAB RESULTS:      Lab 25  1158 25  0744 25  2342   WBC 9.40 10.24  9.87  --    HEMOGLOBIN 11.4* 11.9*  11.9*  --    HEMATOCRIT 35.5* 36.2*  35.7*  --    PLATELETS 192 190  182  --    NEUTROS ABS 7.07* 7.44*  7.16*  --    IMMATURE GRANS (ABS) 0.05 0.05  0.05  --    LYMPHS ABS 1.59 1.89  1.88  --    MONOS ABS 0.57 0.74  0.63  --    EOS ABS 0.09 0.09  0.09  --    MCV 85.5 84.8  83.8  --    CRP  --  6.09*  --    PROCALCITONIN  --  0.71*  --    PROTIME  --   --  9.4   APTT  --  64.0  --    HEPARIN ANTI-XA  --  0.57  --          Lab 25  1703 25  1158 25  0744 25  2342   SODIUM 136 134* 135*  --    POTASSIUM 4.5 4.3 4.0  --    CHLORIDE 102 102 102  --    CO2 24.0 24.0 23.0  --    ANION GAP 10.0 8.0 10.0  --    BUN 13.1 16.6 11.1  --    CREATININE 1.10 1.07 0.82  --    EGFR 73.1 75.6 95.7  --    GLUCOSE 217* 346* 295*  --    CALCIUM 8.9 8.6 8.2*  --    MAGNESIUM  --   --   --  1.7*   HEMOGLOBIN A1C  --   --  8.90*  --          Lab 25  1703 25  0744   TOTAL PROTEIN 7.1 6.5   ALBUMIN 3.2* 3.3*   GLOBULIN 3.9 3.2    ALT (SGPT) 29 16   AST (SGOT) 32 21   BILIRUBIN 0.3 0.3   ALK PHOS 84 73         Lab 06/11/25  2342   PROTIME 9.4   INR 0.96                 Brief Urine Lab Results  (Last result in the past 365 days)        Color   Clarity   Blood   Leuk Est   Nitrite   Protein   CREAT   Urine HCG        04/14/25 1301 Yellow   Clear   Trace   Negative   Negative   Negative                   Microbiology Results Abnormal       None            CT Lower Extremity Left Without Contrast  Addendum Date: 6/13/2025  ADDENDUM #1 Upon further review and comparison with subsequent radiograph, there appears to be some cortical irregularity along the posterior lateral proximal tibia (sagittal series image 83) suggestive of a subacute to chronic periprosthetic fracture. This may be resulted from knee arthroplasty revision surgery. Electronically Signed: Reagan Carson MD  6/13/2025 5:08 PM EDT  Workstation ID: [id number] ORIGINAL REPORT: CT LOWER EXTREMITY LEFT WO CONTRAST Date of Exam: 6/13/2025 1:27 PM EDT Indication: left leg to knee celluitis, h/o knee arthroplasty. Comparison: Knee radiographs 4/21/2014 Technique: Axial CT images were obtained of the left lower extremity without contrast administration.  Reconstructed coronal and sagittal images were also obtained. Automated exposure control and iterative construction methods were used. Findings: Bones: Postoperative changes of total knee arthroplasty with long stem tibial and femoral components. No evidence of hardware complication. No evidence of fracture. No suspicious osseous lesions and no periosteal reaction or cortical deformity. Evaluation of bowel is limited due to streak artifact from the hardware. Soft tissues: Diffuse subcutaneous edema seen predominantly along the lower leg though there is a small amount in the thigh as well. No soft tissue mass or fluid collection seen. Atrophy of the gastrocnemius muscles. Otherwise muscles and tendons appear grossly intact. Impression:  1.Diffuse subcutaneous edema predominantly along the lower leg as well as a small amount in the thigh as well. No soft tissue mass or fluid collection seen. 2.Postoperative changes of total knee arthroplasty with long stem tibial and femoral components. No evidence of hardware complication. Electronically Signed: Reagan Carson MD  6/13/2025 2:17 PM EDT  Workstation ID: [id number]    Result Date: 6/13/2025  CT LOWER EXTREMITY LEFT WO CONTRAST Date of Exam: 6/13/2025 1:27 PM EDT Indication: left leg to knee celluitis, h/o knee arthroplasty. Comparison: Knee radiographs 4/21/2014 Technique: Axial CT images were obtained of the left lower extremity without contrast administration.  Reconstructed coronal and sagittal images were also obtained. Automated exposure control and iterative construction methods were used. Findings: Bones: Postoperative changes of total knee arthroplasty with long stem tibial and femoral components. No evidence of hardware complication. No evidence of fracture. No suspicious osseous lesions and no periosteal reaction or cortical deformity. Evaluation of bowel is limited due to streak artifact from the hardware. Soft tissues: Diffuse subcutaneous edema seen predominantly along the lower leg though there is a small amount in the thigh as well. No soft tissue mass or fluid collection seen. Atrophy of the gastrocnemius muscles. Otherwise muscles and tendons appear grossly intact.     Impression: Impression: 1.Diffuse subcutaneous edema predominantly along the lower leg as well as a small amount in the thigh as well. No soft tissue mass or fluid collection seen. 2.Postoperative changes of total knee arthroplasty with long stem tibial and femoral components. No evidence of hardware complication. Electronically Signed: Reagan Carson MD  6/13/2025 2:17 PM EDT  Workstation ID: [id number]    XR Knee 1 or 2 View Left  Result Date: 6/13/2025  XR KNEE 1 OR 2 VW LEFT Date of Exam: 6/13/2025 4:29 PM EDT  Indication: evaluate total knee arthroplasty Comparison: Same day CT lower extremity Findings: Postoperative changes of knee arthroplasty with longstem tibial and femoral component. There is some mild irregularity seen along the posterolateral proximal tibia. Alignment appears intact otherwise. No substantial joint effusion. No periprosthetic lucency.     Impression: Impression: Postoperative changes of knee arthroplasty with longstem tibial and femoral components. There is some mild irregularity seen along the posterolateral proximal tibia which may reflect a nondisplaced fracture. Electronically Signed: Reagan Carson MD  6/13/2025 5:07 PM EDT  Workstation ID: [id number]      Results for orders placed during the hospital encounter of 06/12/25    Adult Transesophageal Echo (HELLEN) W/ Cont if Necessary Per Protocol    Interpretation Summary    HELLEN was attempted and was unsuccessful due to failure to advance the probe.      Current medications:  Scheduled Meds:apixaban, 5 mg, Oral, Q12H  aspirin, 81 mg, Oral, Daily  carbidopa-levodopa, 1 tablet, Oral, Nightly  ceFAZolin, 2,000 mg, Intravenous, Q8H  DAPTOmycin, 4 mg/kg (Adjusted), Intravenous, Q24H  empagliflozin, 10 mg, Oral, Daily  finasteride, 5 mg, Oral, Daily  folic acid, 1 mg, Oral, Daily  insulin glargine, 25 Units, Subcutaneous, Daily  insulin lispro, 2-9 Units, Subcutaneous, 4x Daily AC & at Bedtime  metoprolol tartrate, 100 mg, Oral, BID  pravastatin, 80 mg, Oral, Daily  sodium chloride, 10 mL, Intravenous, Q12H  tamsulosin, 0.4 mg, Oral, Daily      Continuous Infusions:dilTIAZem, 5-15 mg/hr, Last Rate: 5 mg/hr (06/14/25 1143)      PRN Meds:.  senna-docusate sodium **AND** polyethylene glycol **AND** bisacodyl **AND** bisacodyl    Calcium Replacement - Follow Nurse / BPA Driven Protocol    dextrose    dextrose    etomidate    etomidate    fentaNYL citrate (PF)    flumazenil    glucagon (human recombinant)    Magnesium Standard Dose Replacement - Follow Nurse  / BPA Driven Protocol    midazolam    naloxone    nitroglycerin    ondansetron ODT **OR** ondansetron    Phosphorus Replacement - Follow Nurse / BPA Driven Protocol    Potassium Replacement - Follow Nurse / BPA Driven Protocol    sodium chloride    sodium chloride    Assessment & Plan   Assessment & Plan     Active Hospital Problems    Diagnosis  POA    **Atrial fibrillation [I48.91]  Yes    Primary hypertension [I10]  Unknown    Prediabetes [R73.03]  Unknown    Other hyperlipidemia [E78.49]  Unknown    RLS (restless legs syndrome) [G25.81]  Unknown    Cellulitis of left lower extremity [L03.116]  Unknown      Resolved Hospital Problems   No resolved problems to display.        Brief Hospital Course to date:  Dale Alas is a 69 y.o. male w history of HTN MAREN, here with chills and dyspnea for a week, newly diagnosed Afib, and new leg swelling and LLE cellulitis    Dyspnea  New onset atrial fibrillation  -no PE on CTA  -on Eliquis  -rate control with BB per cardiology  -ECHO pending  - FU with Dr Camacho in 6 weeks     LLE cellulitis  In setting of prosthetic knee   -MRSA PCR negative   -Rocephin  -ID consult:  IV abx ongoing   - Dr Houston does not think arthrocentesis necessary    DM A1C 8.9   -SSI  - on metformin alone at home.  Added glargine; likely DC on basal insulin  - add SGLT2i    RLS  -on sinemet    HTN  -monitor        Expected Discharge Location and Transportation: Home  Expected Discharge TBD  Expected discharge date/ time has not been documented.     VTE Prophylaxis:  Pharmacologic & mechanical VTE prophylaxis orders are present.         AM-PAC 6 Clicks Score (PT): 19 (06/14/25 2000)    CODE STATUS:   Code Status and Medical Interventions: CPR (Attempt to Resuscitate); Full Support   Ordered at: 06/12/25 0654     Code Status (Patient has no pulse and is not breathing):    CPR (Attempt to Resuscitate)     Medical Interventions (Patient has pulse or is breathing):    Full Support       Gerri Greenfield  MD  06/15/25

## 2025-06-15 NOTE — PLAN OF CARE
Problem: Adult Inpatient Plan of Care  Goal: Absence of Hospital-Acquired Illness or Injury  Intervention: Identify and Manage Fall Risk  Recent Flowsheet Documentation  Taken 6/15/2025 0000 by Carlos Traylor RN  Safety Promotion/Fall Prevention:   nonskid shoes/slippers when out of bed   safety round/check completed  Taken 6/14/2025 2200 by Carlos Traylor RN  Safety Promotion/Fall Prevention:   nonskid shoes/slippers when out of bed   safety round/check completed  Taken 6/14/2025 2000 by Carlos Traylor RN  Safety Promotion/Fall Prevention:   activity supervised   assistive device/personal items within reach   clutter free environment maintained   fall prevention program maintained   lighting adjusted   nonskid shoes/slippers when out of bed   room organization consistent   safety round/check completed  Intervention: Prevent Skin Injury  Recent Flowsheet Documentation  Taken 6/14/2025 2000 by Carlos Traylor RN  Body Position: position changed independently  Intervention: Prevent and Manage VTE (Venous Thromboembolism) Risk  Recent Flowsheet Documentation  Taken 6/14/2025 2000 by Carlos Traylor RN  VTE Prevention/Management: other (see comments)  Intervention: Prevent Infection  Recent Flowsheet Documentation  Taken 6/14/2025 2000 by Carlos Traylor RN  Infection Prevention:   environmental surveillance performed   rest/sleep promoted   single patient room provided   Goal Outcome Evaluation:

## 2025-06-16 LAB
GLUCOSE BLDC GLUCOMTR-MCNC: 123 MG/DL (ref 70–130)
GLUCOSE BLDC GLUCOMTR-MCNC: 169 MG/DL (ref 70–130)
GLUCOSE BLDC GLUCOMTR-MCNC: 212 MG/DL (ref 70–130)
GLUCOSE BLDC GLUCOMTR-MCNC: 247 MG/DL (ref 70–130)

## 2025-06-16 PROCEDURE — 99232 SBSQ HOSP IP/OBS MODERATE 35: CPT | Performed by: INTERNAL MEDICINE

## 2025-06-16 PROCEDURE — 25010000002 CEFAZOLIN PER 500 MG: Performed by: INTERNAL MEDICINE

## 2025-06-16 PROCEDURE — 99232 SBSQ HOSP IP/OBS MODERATE 35: CPT | Performed by: NURSE PRACTITIONER

## 2025-06-16 PROCEDURE — 63710000001 INSULIN GLARGINE PER 5 UNITS: Performed by: INTERNAL MEDICINE

## 2025-06-16 PROCEDURE — 82948 REAGENT STRIP/BLOOD GLUCOSE: CPT

## 2025-06-16 PROCEDURE — 63710000001 INSULIN LISPRO (HUMAN) PER 5 UNITS: Performed by: HOSPITALIST

## 2025-06-16 RX ORDER — HYDROCODONE BITARTRATE AND ACETAMINOPHEN 5; 325 MG/1; MG/1
1 TABLET ORAL EVERY 4 HOURS PRN
Refills: 0 | Status: DISCONTINUED | OUTPATIENT
Start: 2025-06-16 | End: 2025-06-19 | Stop reason: HOSPADM

## 2025-06-16 RX ORDER — OXYCODONE HYDROCHLORIDE 10 MG/1
10 TABLET ORAL EVERY 6 HOURS PRN
Refills: 0 | Status: DISCONTINUED | OUTPATIENT
Start: 2025-06-16 | End: 2025-06-19 | Stop reason: HOSPADM

## 2025-06-16 RX ORDER — LINEZOLID 600 MG/1
600 TABLET, FILM COATED ORAL EVERY 12 HOURS SCHEDULED
Status: DISCONTINUED | OUTPATIENT
Start: 2025-06-16 | End: 2025-06-19 | Stop reason: HOSPADM

## 2025-06-16 RX ADMIN — METOPROLOL TARTRATE 100 MG: 100 TABLET, FILM COATED ORAL at 08:47

## 2025-06-16 RX ADMIN — INSULIN LISPRO 4 UNITS: 100 INJECTION, SOLUTION INTRAVENOUS; SUBCUTANEOUS at 11:20

## 2025-06-16 RX ADMIN — LINEZOLID 600 MG: 600 TABLET, FILM COATED ORAL at 21:13

## 2025-06-16 RX ADMIN — INSULIN LISPRO 4 UNITS: 100 INJECTION, SOLUTION INTRAVENOUS; SUBCUTANEOUS at 21:13

## 2025-06-16 RX ADMIN — CARBIDOPA AND LEVODOPA 1 TABLET: 25; 100 TABLET ORAL at 21:13

## 2025-06-16 RX ADMIN — APIXABAN 5 MG: 5 TABLET, FILM COATED ORAL at 21:13

## 2025-06-16 RX ADMIN — APIXABAN 5 MG: 5 TABLET, FILM COATED ORAL at 08:47

## 2025-06-16 RX ADMIN — SODIUM CHLORIDE 2000 MG: 900 INJECTION INTRAVENOUS at 05:26

## 2025-06-16 RX ADMIN — SODIUM CHLORIDE 2000 MG: 900 INJECTION INTRAVENOUS at 21:14

## 2025-06-16 RX ADMIN — SODIUM CHLORIDE 2000 MG: 900 INJECTION INTRAVENOUS at 12:39

## 2025-06-16 RX ADMIN — Medication 10 ML: at 08:48

## 2025-06-16 RX ADMIN — ASPIRIN 81 MG: 81 TABLET, COATED ORAL at 08:47

## 2025-06-16 RX ADMIN — FOLIC ACID 1 MG: 1 TABLET ORAL at 08:47

## 2025-06-16 RX ADMIN — INSULIN LISPRO 2 UNITS: 100 INJECTION, SOLUTION INTRAVENOUS; SUBCUTANEOUS at 16:33

## 2025-06-16 RX ADMIN — TAMSULOSIN HYDROCHLORIDE 0.4 MG: 0.4 CAPSULE ORAL at 08:46

## 2025-06-16 RX ADMIN — INSULIN GLARGINE 25 UNITS: 100 INJECTION, SOLUTION SUBCUTANEOUS at 08:47

## 2025-06-16 RX ADMIN — LINEZOLID 600 MG: 600 TABLET, FILM COATED ORAL at 12:37

## 2025-06-16 RX ADMIN — PRAVASTATIN SODIUM 80 MG: 40 TABLET ORAL at 08:47

## 2025-06-16 RX ADMIN — FINASTERIDE 5 MG: 5 TABLET, FILM COATED ORAL at 08:47

## 2025-06-16 RX ADMIN — METOPROLOL TARTRATE 100 MG: 100 TABLET, FILM COATED ORAL at 21:13

## 2025-06-16 RX ADMIN — EMPAGLIFLOZIN 10 MG: 10 TABLET, FILM COATED ORAL at 08:47

## 2025-06-16 RX ADMIN — Medication 5 MG/HR: at 12:39

## 2025-06-16 NOTE — CONSULTS
"Diabetes Education    Patient Name:  Dale Alas  YOB: 1955  MRN: [id number]  Admit Date:  6/12/2025      Consult for diabetes education received for insulin teaching. Chart reviewed. Pt was seen at bedside today. Permission given for visit.       Pt reports he is aware of having prediabetes and states his medicine \"keeps it under control\". We talked extensively about his current A1c of 8.9, ADA diagnostic criteria for diabetes, and how insulin works in the body. He reports that his current regimen is as follows:    31 u toujeo insulin every morning  Mounjaro weekly (uncertain of current dose)  Metformin xr 500 mg BID    Discussed and taught Mr. Alas about type 2 diabetes self-management, risk factors, and importance of blood glucose control to reduce complications. Target blood glucose readings and A1c goals per ADA were reviewed.     Reviewed the following ADA survival skill concepts with pt:     Meal planning: Discussed pts current eating pattern and potential strategies to help improve it.  Suggested “the plate method” as a potential healthy eating plan and reviewed this with pt. Pt denies food insecurity. Discussed typical eating pattern with him and soruces of carbs/portion sizes.     Safe medication administration: Reviewed pts current medication regimen. Encouraged pt to take medications as prescribed and contact provider or pharmacist if they are experiencing side effects.      Monitoring (timing and technique): Encouraged pt to monitor blood sugar at home 2-3 times per day and to call PCP if blood sugar is trending high. Discussed benefits of SMBG/CGMS to help guide pt and provider decision making. Encouraged to keep record of blood glucose readings to take to follow up appointment with PCP. Pt states that he does not have a meter and has not ever used one before. Discussed with pt how it is dangerous to take insulin without SMBG and why it is vital that he get a meter and begin to SMBG " "regularly and with any s/s high or low blood sugar. Sent Dr. Greenfield epic message requesting RX for meter and testing supplies at discharge.      Prevention and treatment of hypoglycemia and hyperglycemia: Signs, symptoms, and treatment of hypoglycemia and hyperglycemia discussed with pt. Reviewed prevention strategies such as consistent eating pattern and taking medications as directed.  Pt is able to teach back appropriate treatment of hypoglycemia using the rule of 15s after receiving instruction.      Sick day management: Reviewed general sick day guidelines with pt, including drinking plenty of water, keeping simple carbs handy such as jell-o or popsicles, checking blood glucose more often (every 2-4 hours or as directed by provider), and if/when ketone testing is appropriate. Encouraged pt to make a sick-day kit at home.      Physical activity: Reviewed benefits of physical activity for diabetes management and overall health. Encouraged pt to begin in 10 min increments to build up to recommended 150 minute per week after speaking with doctor about which activities may be right for them.      Follow-up care: Reviewed importance of ongoing follow-up with provider. Reviewed importance of annual physical exams, annual eye exams, regular dental exams, daily foot examination, and encouraged patient to discuss immunization needs with provider. Pt encouraged to attend scheduled appointments. Provided information about outpatient diabetes education classes at [facility].      Provided patient with copy of [facility]'s \"Life with Diabetes\" handout.       Thank you for this consult.      Total time spent reviewing chart, preparing education/materials, providing education at bedside, and coordinating care approx 30 minutes.       Will plan for follow up at a later time prior to discharge for meter teach.     Electronically signed by:  Yudi Black RN, [facility]  06/16/25 12:24 EDT  "

## 2025-06-16 NOTE — CONSULTS
Diabetes Education    Patient Name:  Dale Alas  YOB: 1955  MRN: [id number]  Admit Date:  6/12/2025    Patient was instructed on how to use our demo One Touch Verio Meter. Instructed to check expiration date and safe storage of glucose test strips, prepare lancing device, obtain a sample, perform test, and safe disposal of lancets in a sharps container or other plastic container with secure lid.  Testing frequency per MD instructions, provided general guidelines on target blood glucose, advised patient to discuss personal targets for glucose at their next visit.  Record keeping discussed. Discussed how to interpret test results. Encouraged notification to provider if results indicate trends for hypoglycemia or hyperglycemia, or questions about results occur.      Pt was able to return demonstration using the teach back method. Discussed and demonstrated how to log test results.  Urged to call [phone] on back of meter for questions on use, complete the warranty card and mail. Reviewed hypoglycemia and hyperglycemia signs and symptoms with patient and importance of testing. All questions and concerns addressed.       Education handouts provided ADA’s “Using a blood glucose meter” questions answered and pt was advised to call with any other questions or concerns.      Thank you for this consult.       Total time spent reviewing chart, preparing education/materials, providing education at bedside, and coordinating care approx 25 minutes.     Electronically signed by:  Yudi Black RN, [facility]  06/16/25 14:39 EDT

## 2025-06-16 NOTE — PROGRESS NOTES
"[facility] at [facility]  IP Progress Note      Chief Complaint: Follow-up of atrial fibrillation.    Subjective   Resting in bed, reports feeling a bit better.  Still has cellulitis, remains on IV antibiotics.  Ambulating in the room and hallway    Objective     Blood pressure 158/82, pulse 83, temperature 99 °F (37.2 °C), temperature source Axillary, resp. rate 18, height 189.2 cm (74.49\"), weight (!) 149 kg (328 lb 7.8 oz), SpO2 96%.     Intake/Output Summary (Last 24 hours) at 6/16/2025 0858  Last data filed at 6/16/2025 0700  Gross per 24 hour   Intake --   Output 1900 ml   Net -1900 ml         Physical Exam:  General: No apparent distress.  Neck: no JVD.  Chest:No respiratory distress, breath sounds are normal. No wheezes,  rhonchi or rales.  Cardiovascular: Normal S1 and S2, irregular/distant.  Extremities: Left leg cellulitis with trace edema.      Results Review:     I reviewed the patient's new clinical results.    Results from last 7 days   Lab Units 06/15/25  2036   WBC 10*3/mm3 9.10   HEMOGLOBIN g/dL 12.8*   HEMATOCRIT % 38.8   PLATELETS 10*3/mm3 201     Results from last 7 days   Lab Units 06/14/25  1703   SODIUM mmol/L 136   POTASSIUM mmol/L 4.5   CHLORIDE mmol/L 102   CO2 mmol/L 24.0   BUN mg/dL 13.1   CREATININE mg/dL 1.10   CALCIUM mg/dL 8.9   BILIRUBIN mg/dL 0.3   ALK PHOS U/L 84   ALT (SGPT) U/L 29   AST (SGOT) U/L 32   GLUCOSE mg/dL 217*     Results from last 7 days   Lab Units 06/14/25  1703   SODIUM mmol/L 136   POTASSIUM mmol/L 4.5   CHLORIDE mmol/L 102   CO2 mmol/L 24.0   BUN mg/dL 13.1   CREATININE mg/dL 1.10   GLUCOSE mg/dL 217*   CALCIUM mg/dL 8.9     Results from last 7 days   Lab Units 06/11/25  2342   INR  0.96     apixaban, 5 mg, Oral, Q12H  aspirin, 81 mg, Oral, Daily  carbidopa-levodopa, 1 tablet, Oral, Nightly  ceFAZolin, 2,000 mg, Intravenous, Q8H  DAPTOmycin, 4 mg/kg (Adjusted), Intravenous, Q24H  empagliflozin, 10 mg, Oral, Daily  finasteride, 5 mg, Oral, " Daily  folic acid, 1 mg, Oral, Daily  insulin glargine, 25 Units, Subcutaneous, Daily  insulin lispro, 2-9 Units, Subcutaneous, 4x Daily AC & at Bedtime  metoprolol tartrate, 100 mg, Oral, BID  pravastatin, 80 mg, Oral, Daily  sodium chloride, 10 mL, Intravenous, Q12H  tamsulosin, 0.4 mg, Oral, Daily         Tele: Atrial Fib with Controlled Rate    Assessment:  Atrial fibrillation with variable rate control, unclear duration.  Normal ejection fraction by echocardiogram, borderline LA enlargement.  Hypertension.  Obesity  Left lower extremity cellulitis.  Plan:  Continue metoprolol for rate control and Eliquis for anticoagulation.  Management of medical conditions per hospitalist.  Not much more to add from cardiology standpoint, I will sign off.  Please reconsult if needed.    After discharge he can follow-up with me in 6 weeks.  At that time we will reevaluate for need/suitability of cardioversion.    Terry Camacho MD, FACC, [facility]

## 2025-06-16 NOTE — PROGRESS NOTES
[facility] Medicine Services  PROGRESS NOTE    Patient Name: Dale Alas  : 1955  MRN: [id number]    Date of Admission: 2025  Primary Care Physician: Guillermo Enciso MD    Subjective   Subjective     CC:  LLE pain     HPI:  Patient is resting in bed in NAD. He states left leg is painful today. Tolerating diet.       Objective   Objective     Vital Signs:   Temp:  [97.9 °F (36.6 °C)-99.4 °F (37.4 °C)] 99 °F (37.2 °C)  Heart Rate:  [] 100  Resp:  [18] 18  BP: (130-178)/(61-96) 141/81     Physical Exam:  Constitutional: No acute distress, awake, alert  HENT: NCAT, mucous membranes moist  Respiratory: Clear to auscultation bilaterally, respiratory effort normal room air 97%  Cardiovascular: RRR, no murmurs, rubs, or gallops  Gastrointestinal: Positive bowel sounds, soft, nontender, nondistended  Musculoskeletal: Lef LE edema and erythema   Psychiatric: Appropriate affect, cooperative  Neurologic: Oriented x 3, strength symmetric in all extremities, speech clear  Skin: No rashes      Results Reviewed:  LAB RESULTS:      Lab 06/15/25  2036 25  1158 25  0744 25  2342   WBC 9.10 9.40 10.24  9.87  --    HEMOGLOBIN 12.8* 11.4* 11.9*  11.9*  --    HEMATOCRIT 38.8 35.5* 36.2*  35.7*  --    PLATELETS 201 192 190  182  --    NEUTROS ABS  --  7.07* 7.44*  7.16*  --    IMMATURE GRANS (ABS)  --  0.05 0.05  0.05  --    LYMPHS ABS  --  1.59 1.89  1.88  --    MONOS ABS  --  0.57 0.74  0.63  --    EOS ABS  --  0.09 0.09  0.09  --    MCV 84.7 85.5 84.8  83.8  --    CRP  --   --  6.09*  --    PROCALCITONIN  --   --  0.71*  --    PROTIME  --   --   --  9.4   APTT  --   --  64.0  --    HEPARIN ANTI-XA  --   --  0.57  --          Lab 25  1703 25  1158 25  0744 25  2342   SODIUM 136 134* 135*  --    POTASSIUM 4.5 4.3 4.0  --    CHLORIDE 102 102 102  --    CO2 24.0 24.0 23.0  --    ANION GAP 10.0 8.0 10.0  --    BUN 13.1 16.6 11.1  --     CREATININE 1.10 1.07 0.82  --    EGFR 73.1 75.6 95.7  --    GLUCOSE 217* 346* 295*  --    CALCIUM 8.9 8.6 8.2*  --    MAGNESIUM  --   --   --  1.7*   HEMOGLOBIN A1C  --   --  8.90*  --          Lab 06/14/25  1703 06/12/25  0744   TOTAL PROTEIN 7.1 6.5   ALBUMIN 3.2* 3.3*   GLOBULIN 3.9 3.2   ALT (SGPT) 29 16   AST (SGOT) 32 21   BILIRUBIN 0.3 0.3   ALK PHOS 84 73         Lab 06/11/25  2342   PROTIME 9.4   INR 0.96                 Brief Urine Lab Results  (Last result in the past 365 days)        Color   Clarity   Blood   Leuk Est   Nitrite   Protein   CREAT   Urine HCG        04/14/25 1301 Yellow   Clear   Trace   Negative   Negative   Negative                   Microbiology Results Abnormal       None            No radiology results from the last 24 hrs    Results for orders placed during the hospital encounter of 06/12/25    Adult Transthoracic Echo Complete W/ Cont if Necessary Per Protocol    Interpretation Summary    Left ventricular systolic function is normal. Calculated left ventricular EF = 69.8% Normal left ventricular cavity size noted. Left ventricular wall thickness is consistent with mild concentric hypertrophy. All left ventricular wall segments contract normally. Normal left atrial pressure.    The left atrial cavity is borderline dilated. Left atrial volume is borderline increased.    The aortic valve is grossly normal in structure. No significant aortic valve regurgitation is present. No aortic valve stenosis is present.    The mitral valve is grossly normal in structure. Trace mitral valve regurgitation is present. No significant mitral valve stenosis is present.    Tricuspid valve not well visualized. Trace tricuspid valve regurgitation is present.      Current medications:  Scheduled Meds:apixaban, 5 mg, Oral, Q12H  aspirin, 81 mg, Oral, Daily  carbidopa-levodopa, 1 tablet, Oral, Nightly  ceFAZolin, 2,000 mg, Intravenous, Q8H  DAPTOmycin, 4 mg/kg (Adjusted), Intravenous, Q24H  empagliflozin,  10 mg, Oral, Daily  finasteride, 5 mg, Oral, Daily  folic acid, 1 mg, Oral, Daily  insulin glargine, 25 Units, Subcutaneous, Daily  insulin lispro, 2-9 Units, Subcutaneous, 4x Daily AC & at Bedtime  metoprolol tartrate, 100 mg, Oral, BID  pravastatin, 80 mg, Oral, Daily  sodium chloride, 10 mL, Intravenous, Q12H  tamsulosin, 0.4 mg, Oral, Daily      Continuous Infusions:dilTIAZem, 5-15 mg/hr, Last Rate: 10 mg/hr (06/15/25 0804)      PRN Meds:.  senna-docusate sodium **AND** polyethylene glycol **AND** bisacodyl **AND** bisacodyl    Calcium Replacement - Follow Nurse / BPA Driven Protocol    dextrose    dextrose    glucagon (human recombinant)    HYDROcodone-acetaminophen    Magnesium Standard Dose Replacement - Follow Nurse / BPA Driven Protocol    nitroglycerin    ondansetron ODT **OR** ondansetron    Phosphorus Replacement - Follow Nurse / BPA Driven Protocol    Potassium Replacement - Follow Nurse / BPA Driven Protocol    sodium chloride    sodium chloride    Assessment & Plan   Assessment & Plan     Active Hospital Problems    Diagnosis  POA    **Atrial fibrillation [I48.91]  Yes    Primary hypertension [I10]  Unknown    Prediabetes [R73.03]  Unknown    Other hyperlipidemia [E78.49]  Unknown    RLS (restless legs syndrome) [G25.81]  Unknown    Cellulitis of left lower extremity [L03.116]  Unknown      Resolved Hospital Problems   No resolved problems to display.        Brief Hospital Course to date:  Dale Alas is a 69 y.o. male  w history of HTN MAREN, here with chills and dyspnea for a week, newly diagnosed Afib, and new leg swelling and LLE cellulitis    Plan was partially entered by my partner and I have reviewed and updated as appropriate on 6/16/25     Dyspnea  New onset atrial fibrillation  -no PE on CTA  -on Eliquis  -rate control with BB per cardiology  -ECHO normal EF borderline LA enlargement  - FU with Dr Camacho in 6 weeks      LLE cellulitis  In setting of prosthetic knee   -MRSA PCR negative    -Rocephin  -ID consult:  IV abx ongoing with Cefazolin and daptomycin  - Dr Houston ortho evaluated knee due to hx of arthroplasty does not think arthrocentesis necessary  -- blood cultures NGTD     DM A1C 8.9   -SSI  - on metformin alone at home.  Added glargine; likely DC on basal insulin  - add SGLT2i     RLS  -on sinemet     HTN  -monitor    Expected Discharge Location and Transportation: home   Expected Discharge   Expected Discharge Date: 6/18/2025; Expected Discharge Time:      VTE Prophylaxis:  Pharmacologic & mechanical VTE prophylaxis orders are present.         AM-PAC 6 Clicks Score (PT): 20 (06/16/25 0900)    CODE STATUS:   Code Status and Medical Interventions: CPR (Attempt to Resuscitate); Full Support   Ordered at: 06/12/25 0654     Code Status (Patient has no pulse and is not breathing):    CPR (Attempt to Resuscitate)     Medical Interventions (Patient has pulse or is breathing):    Full Support       Bonny Cheema, YOHANNES  06/16/25

## 2025-06-16 NOTE — PROGRESS NOTES
"  INFECTIOUS DISEASES  INPATIENT PROGRESS NOTE  2025      PATIENT NAME: Dale Alas  :  1955  MRN:  [id number]  Date of Admission:  2025      Antimicrobials:  ceFAZolin 2000 mg IVPB in 100 mL NS (MBP)  DAPTOmycin (CUBICIN)  IV      MAR reviewed.     Reason for consultation: Left leg cellulitis    Interval history: No pain in the knee or ankle.  Swelling around the knee is better.  Ongoing pain and pressure about the left ankle.  He thought he had some drainage earlier but none currently.  No fevers.  Tolerating antibiotics without side effects.  Up ambulating with walker.    ROS:  No fevers/chills overnight.  No new rashes.  No SOB or chest pain.  No nausea/vomiting/diarrhea.  Denies side effects from antimicrobials.     Objective:  Temp (24hrs), Av.6 °F (37 °C), Min:97.9 °F (36.6 °C), Max:99.4 °F (37.4 °C)    /69 (BP Location: Left arm, Patient Position: Lying)   Pulse 95   Temp 98.8 °F (37.1 °C) (Axillary)   Resp 18   Ht 189.2 cm (74.49\")   Wt (!) 149 kg (328 lb 7.8 oz)   SpO2 94%   BMI 41.62 kg/m²     Physical Examination:  GENERAL: Improved appearing male.  Awake and alert, in no acute distress. Obese.  HEENT: Normocephalic, atraumatic.  LUNGS: Normal respiratory effort.  ABDOMEN: Soft, nontender, nondistended.  No rebound or guarding.  Obese  EXT:  +left leg edema - improved around knee   MSK: Good movement of left knee and ankle without pain.  SKIN: No lesions of the left foot.  Ongoing improvement of redness and warmth of the left ankle and posterior calf; better more superiorly near knee.  Scabbed lesions of the left knee and the left anterior shin are healing.  No drainage.  NEURO: A&Ox4. No focal deficits.  Face symmetric.  Speech fluent.  Moves all extremities well.   PSYCHIATRIC: Normal insight and judgement.  Cooperative.  Normal affect.    Laboratory Data:    Results from last 7 days   Lab Units 06/15/25  2036 06/13/25  1158 25  0744   WBC 10*3/mm3 9.10 9.40 " 10.24  9.87   HEMOGLOBIN g/dL 12.8* 11.4* 11.9*  11.9*   HEMATOCRIT % 38.8 35.5* 36.2*  35.7*   PLATELETS 10*3/mm3 201 192 190  182     Results from last 7 days   Lab Units 06/14/25  1703   SODIUM mmol/L 136   POTASSIUM mmol/L 4.5   CHLORIDE mmol/L 102   CO2 mmol/L 24.0   BUN mg/dL 13.1   CREATININE mg/dL 1.10   GLUCOSE mg/dL 217*   CALCIUM mg/dL 8.9     Results from last 7 days   Lab Units 06/14/25  1703   ALK PHOS U/L 84   BILIRUBIN mg/dL 0.3   ALT (SGPT) U/L 29   AST (SGOT) U/L 32         Results from last 7 days   Lab Units 06/12/25  0744   CRP mg/dL 6.09*     Estimated Creatinine Clearance: 100 mL/min (by C-G formula based on SCr of 1.1 mg/dL).      Results from last 7 days   Lab Units 06/14/25  1703   CK TOTAL U/L 90               Microbiology:    Microbiology Results (last 10 days)       Procedure Component Value - Date/Time    Wound Culture - Wound, Leg, Left [[id number]] Collected: 06/13/25 1153    Lab Status: Final result Specimen: Wound from Leg, Left Updated: 06/15/25 0803     Wound Culture Moderate growth (3+) Normal Skin Cortney     Gram Stain Few (2+) WBCs seen      Moderate (3+) Gram positive cocci in pairs    Blood Culture - Blood, Hand, Right [[id number]]  (Normal) Collected: 06/12/25 0750    Lab Status: Preliminary result Specimen: Blood from Hand, Right Updated: 06/16/25 0815     Blood Culture No growth at 4 days    Narrative:      Less than seven (7) mL's of blood was collected.  Insufficient quantity may yield false negative results.    Blood Culture - Blood, Hand, Left [[id number]]  (Normal) Collected: 06/12/25 0744    Lab Status: Preliminary result Specimen: Blood from Hand, Left Updated: 06/16/25 0815     Blood Culture No growth at 4 days    Narrative:      Less than seven (7) mL's of blood was collected.  Insufficient quantity may yield false negative results.    MRSA Screen, PCR (Inpatient) - Swab, Nares [[id number]]  (Normal) Collected: 06/12/25 0716    Lab Status: Final result Specimen:  Swab from Nares Updated: 06/12/25 0858     MRSA PCR Negative    Narrative:      The negative predictive value of this diagnostic test is high and should only be used to consider de-escalating anti-MRSA therapy. A positive result may indicate colonization with MRSA and must be correlated clinically.  MRSA Negative              Radiology:  No radiology results for the last day          DISCUSSION:  69 y.o. male with history of obesity, uncontrolled diabetes, and remote left knee arthroplasty admitted with left leg cellulitis.      PROBLEM LIST:   -- Left leg cellulitis.  Seems to have started after abrasion on the shin.  Small mild purulence able to be expressed from that wound.  Likely staphylococcal or streptococcal infection.  Wound culture with normal skin nathanael.  Risk to develop left knee periprosthetic joint infection but no effusion or pain.  No fluid collection on CT.  -- History of remote left knee arthroplasty, last in 2013 by Dr. Matias at [facility].  He reports he has had 3 total surgeries on his left knee, the first arthroplasty followed by 2 revisions due to mechanical issues and not infection.  Orthopedics evaluated and low suspicion for PJI as no pain or effusion; avoid aspiration with overlying cellulitis.  -- History of left lower leg orthopedic hardware, status post removal several years ago.  Details unknown.  -- History of left leg vein procedure.  Duplex this admission negative for DVT.  -- Atrial fibrillation, new onset.  Exacerbated by current infection.  Failed HELLEN, inability to pass probe.  -- Uncontrolled diabetes mellitus type 2.  Contributing to propensity for infection and poor wound healing.    PLAN:  -- Continue cefazolin 2 g iv q8h  -- Change daptomycin to oral linezolid to help with Strep toxin production and empiric MRSA coverage  -- Follow exam.  Slow improvement.    -- Low suspicion for PJI but continue to monitor knee.  -- Keep leg elevated  -- Probably still  needs a couple of more days in hospital    Plan discussed with patient.    This visit included the following complex service elements:  Complex medical decision-making associated with antimicrobial prescribing.  In-depth chart review with high level synthesis for complex diagnoses.      Carlos Dumont MD  6/16/2025  11:51 EDT

## 2025-06-16 NOTE — PLAN OF CARE
Problem: Adult Inpatient Plan of Care  Goal: Absence of Hospital-Acquired Illness or Injury  Intervention: Identify and Manage Fall Risk  Recent Flowsheet Documentation  Taken 6/16/2025 0200 by Carlos Traylor RN  Safety Promotion/Fall Prevention:   nonskid shoes/slippers when out of bed   safety round/check completed  Taken 6/16/2025 0000 by Carlos Traylor RN  Safety Promotion/Fall Prevention:   nonskid shoes/slippers when out of bed   safety round/check completed  Taken 6/15/2025 2200 by Carlos Traylor RN  Safety Promotion/Fall Prevention:   nonskid shoes/slippers when out of bed   safety round/check completed  Taken 6/15/2025 2000 by Carlos Traylor RN  Safety Promotion/Fall Prevention:   activity supervised   assistive device/personal items within reach   clutter free environment maintained   fall prevention program maintained   lighting adjusted   nonskid shoes/slippers when out of bed   room organization consistent   safety round/check completed  Intervention: Prevent Skin Injury  Recent Flowsheet Documentation  Taken 6/15/2025 2000 by Carlos Traylor RN  Body Position: position changed independently  Intervention: Prevent Infection  Recent Flowsheet Documentation  Taken 6/15/2025 2000 by Carlos Traylor RN  Infection Prevention:   environmental surveillance performed   rest/sleep promoted   single patient room provided     Problem: Adult Inpatient Plan of Care  Goal: Absence of Hospital-Acquired Illness or Injury  Intervention: Prevent Skin Injury  Recent Flowsheet Documentation  Taken 6/15/2025 2000 by Carlos Traylor RN  Body Position: position changed independently   Goal Outcome Evaluation:

## 2025-06-16 NOTE — CASE MANAGEMENT/SOCIAL WORK
Continued Stay Note   [facility]     Patient Name: Dale Alas  MRN: [id number]  Today's Date: 6/16/2025    Admit Date: 6/12/2025    Plan: Home   Discharge Plan       Row Name 06/16/25 1549       Plan    Plan Home    Patient/Family in Agreement with Plan yes    Plan Comments SW met with pt at bedside.  Pt stated he's still having some leg pain, but the redness is improving.  Pt is using the walker to get up and use the restroom.  Pt is likely to discharge home.  He said he lives with his son and his son will provide transportation for him.    Final Discharge Disposition Code 01 - home or self-care                   Discharge Codes    No documentation.                 Expected Discharge Date and Time       Expected Discharge Date Expected Discharge Time    Jun 18, 2025               AN Valencia

## 2025-06-16 NOTE — PLAN OF CARE
Problem: Adult Inpatient Plan of Care  Goal: Plan of Care Review  Outcome: Progressing  Flowsheets (Taken 6/16/2025 1423)  Outcome Evaluation: VSS, on RA when awake. On Cardizem gtt for afib. Pt only complains of pain in left leg when ambulating or being touched. Pt reports left leg is less swollen then yesterday. Pt has no other complaints at this time.  Goal: Patient-Specific Goal (Individualized)  Outcome: Progressing  Goal: Absence of Hospital-Acquired Illness or Injury  Outcome: Progressing  Intervention: Identify and Manage Fall Risk  Recent Flowsheet Documentation  Taken 6/16/2025 1400 by Rory Pinon RN  Safety Promotion/Fall Prevention:   activity supervised   assistive device/personal items within reach  Taken 6/16/2025 1200 by Rory Pinon RN  Safety Promotion/Fall Prevention:   activity supervised   clutter free environment maintained   safety round/check completed  Taken 6/16/2025 1000 by Rory Pinon RN  Safety Promotion/Fall Prevention:   activity supervised   safety round/check completed  Intervention: Prevent Skin Injury  Recent Flowsheet Documentation  Taken 6/16/2025 1400 by Rory Pinon RN  Body Position: position changed independently  Skin Protection: transparent dressing maintained  Taken 6/16/2025 1200 by Rory Pinon RN  Body Position: position changed independently  Skin Protection: transparent dressing maintained  Taken 6/16/2025 1000 by Rory Pinon RN  Body Position: position changed independently  Skin Protection: transparent dressing maintained  Taken 6/16/2025 0900 by Rory Pinon RN  Skin Protection: transparent dressing maintained  Intervention: Prevent Infection  Recent Flowsheet Documentation  Taken 6/16/2025 1400 by Rory Pinon RN  Infection Prevention: environmental surveillance performed  Taken 6/16/2025 1200 by Rory Pinon RN  Infection Prevention: environmental surveillance performed  Taken 6/16/2025 1000 by Rory Pinon  RN  Infection Prevention: environmental surveillance performed  Goal: Optimal Comfort and Wellbeing  Outcome: Progressing  Intervention: Provide Person-Centered Care  Recent Flowsheet Documentation  Taken 6/16/2025 0900 by Rory Pinon RN  Trust Relationship/Rapport: care explained  Goal: Readiness for Transition of Care  Outcome: Progressing     Problem: Comorbidity Management  Goal: Blood Glucose Level Within Target Range  Outcome: Progressing  Goal: Blood Pressure in Desired Range  Outcome: Progressing     Problem: Skin Injury Risk Increased  Goal: Skin Health and Integrity  Outcome: Progressing  Intervention: Optimize Skin Protection  Recent Flowsheet Documentation  Taken 6/16/2025 1400 by Rory Pinon RN  Activity Management: activity encouraged  Pressure Reduction Techniques: frequent weight shift encouraged  Head of Bed (HOB) Positioning: HOB elevated  Pressure Reduction Devices: pressure-redistributing mattress utilized  Skin Protection: transparent dressing maintained  Taken 6/16/2025 1200 by Rory Pinon RN  Activity Management: activity encouraged  Pressure Reduction Techniques: frequent weight shift encouraged  Head of Bed (HOB) Positioning: HOB elevated  Pressure Reduction Devices: pressure-redistributing mattress utilized  Skin Protection: transparent dressing maintained  Taken 6/16/2025 1000 by Rory Pinon RN  Activity Management: activity encouraged  Pressure Reduction Techniques: frequent weight shift encouraged  Head of Bed (HOB) Positioning: HOB elevated  Pressure Reduction Devices: pressure-redistributing mattress utilized  Skin Protection: transparent dressing maintained  Taken 6/16/2025 0900 by Rory Pinon RN  Pressure Reduction Techniques:   weight shift assistance provided   pressure points protected  Pressure Reduction Devices: pressure-redistributing mattress utilized  Skin Protection: transparent dressing maintained   Goal Outcome Evaluation:              Outcome  Evaluation: VSS, on RA when awake. On Cardizem gtt for afib. Pt only complains of pain in left leg when ambulating or being touched. Pt reports left leg is less swollen then yesterday. Pt has no other complaints at this time.

## 2025-06-17 LAB
BACTERIA SPEC AEROBE CULT: NORMAL
BACTERIA SPEC AEROBE CULT: NORMAL
GLUCOSE BLDC GLUCOMTR-MCNC: 126 MG/DL (ref 70–130)
GLUCOSE BLDC GLUCOMTR-MCNC: 177 MG/DL (ref 70–130)
GLUCOSE BLDC GLUCOMTR-MCNC: 183 MG/DL (ref 70–130)
GLUCOSE BLDC GLUCOMTR-MCNC: 220 MG/DL (ref 70–130)

## 2025-06-17 PROCEDURE — 63710000001 INSULIN LISPRO (HUMAN) PER 5 UNITS: Performed by: HOSPITALIST

## 2025-06-17 PROCEDURE — 25010000002 CEFAZOLIN PER 500 MG: Performed by: INTERNAL MEDICINE

## 2025-06-17 PROCEDURE — 63710000001 INSULIN GLARGINE PER 5 UNITS: Performed by: INTERNAL MEDICINE

## 2025-06-17 PROCEDURE — 99232 SBSQ HOSP IP/OBS MODERATE 35: CPT | Performed by: NURSE PRACTITIONER

## 2025-06-17 PROCEDURE — 82948 REAGENT STRIP/BLOOD GLUCOSE: CPT

## 2025-06-17 RX ADMIN — APIXABAN 5 MG: 5 TABLET, FILM COATED ORAL at 20:53

## 2025-06-17 RX ADMIN — CARBIDOPA AND LEVODOPA 1 TABLET: 25; 100 TABLET ORAL at 20:53

## 2025-06-17 RX ADMIN — INSULIN GLARGINE 25 UNITS: 100 INJECTION, SOLUTION SUBCUTANEOUS at 09:08

## 2025-06-17 RX ADMIN — APIXABAN 5 MG: 5 TABLET, FILM COATED ORAL at 09:08

## 2025-06-17 RX ADMIN — FINASTERIDE 5 MG: 5 TABLET, FILM COATED ORAL at 09:08

## 2025-06-17 RX ADMIN — SODIUM CHLORIDE 2000 MG: 900 INJECTION INTRAVENOUS at 15:43

## 2025-06-17 RX ADMIN — EMPAGLIFLOZIN 10 MG: 10 TABLET, FILM COATED ORAL at 09:07

## 2025-06-17 RX ADMIN — LINEZOLID 600 MG: 600 TABLET, FILM COATED ORAL at 20:53

## 2025-06-17 RX ADMIN — Medication 10 ML: at 09:08

## 2025-06-17 RX ADMIN — METOPROLOL TARTRATE 100 MG: 100 TABLET, FILM COATED ORAL at 20:53

## 2025-06-17 RX ADMIN — HYDROCODONE BITARTRATE AND ACETAMINOPHEN 1 TABLET: 5; 325 TABLET ORAL at 17:30

## 2025-06-17 RX ADMIN — SODIUM CHLORIDE 2000 MG: 900 INJECTION INTRAVENOUS at 05:39

## 2025-06-17 RX ADMIN — INSULIN LISPRO 3 UNITS: 100 INJECTION, SOLUTION INTRAVENOUS; SUBCUTANEOUS at 17:30

## 2025-06-17 RX ADMIN — PRAVASTATIN SODIUM 80 MG: 40 TABLET ORAL at 09:07

## 2025-06-17 RX ADMIN — SODIUM CHLORIDE 2000 MG: 900 INJECTION INTRAVENOUS at 23:19

## 2025-06-17 RX ADMIN — HYDROCODONE BITARTRATE AND ACETAMINOPHEN 1 TABLET: 5; 325 TABLET ORAL at 11:19

## 2025-06-17 RX ADMIN — OXYCODONE HYDROCHLORIDE 10 MG: 10 TABLET ORAL at 02:21

## 2025-06-17 RX ADMIN — FOLIC ACID 1 MG: 1 TABLET ORAL at 09:07

## 2025-06-17 RX ADMIN — METOPROLOL TARTRATE 100 MG: 100 TABLET, FILM COATED ORAL at 09:08

## 2025-06-17 RX ADMIN — ASPIRIN 81 MG: 81 TABLET, COATED ORAL at 09:07

## 2025-06-17 RX ADMIN — TAMSULOSIN HYDROCHLORIDE 0.4 MG: 0.4 CAPSULE ORAL at 09:08

## 2025-06-17 RX ADMIN — LINEZOLID 600 MG: 600 TABLET, FILM COATED ORAL at 09:07

## 2025-06-17 NOTE — PROGRESS NOTES
[facility] Medicine Services  PROGRESS NOTE    Patient Name: Dale Alas  : 1955  MRN: [id number]    Date of Admission: 2025  Primary Care Physician: Guillermo Enciso MD    Subjective   Subjective     CC:  LLE pain     HPI:  Increased pain overnight in left calf.        Objective   Objective     Vital Signs:   Temp:  [97.8 °F (36.6 °C)-99.3 °F (37.4 °C)] 97.8 °F (36.6 °C)  Heart Rate:  [72-99] 73  Resp:  [18-20] 20  BP: (108-159)/() 135/51     Physical Exam:  Constitutional: No acute distress, awake, alert  HENT: NCAT, mucous membranes moist  Respiratory: Clear to auscultation bilaterally, respiratory effort normal   Cardiovascular: irregular-afib on tele, no murmurs, rubs, or gallops  Gastrointestinal: Positive bowel sounds, soft, nontender, nondistended, obese  Musculoskeletal: Trace bilateral ankle edema  Psychiatric: Appropriate affect, cooperative  Neurologic: Oriented x 3, ALEXIS, speech clear  Skin: No rashes noted.  Redness of LLE slightly improved with visual recession from marked line        Results Reviewed:  LAB RESULTS:      Lab 06/15/25  2036 25  1158 25  0744 25  2342   WBC 9.10 9.40 10.24  9.87  --    HEMOGLOBIN 12.8* 11.4* 11.9*  11.9*  --    HEMATOCRIT 38.8 35.5* 36.2*  35.7*  --    PLATELETS 201 192 190  182  --    NEUTROS ABS  --  7.07* 7.44*  7.16*  --    IMMATURE GRANS (ABS)  --  0.05 0.05  0.05  --    LYMPHS ABS  --  1.59 1.89  1.88  --    MONOS ABS  --  0.57 0.74  0.63  --    EOS ABS  --  0.09 0.09  0.09  --    MCV 84.7 85.5 84.8  83.8  --    CRP  --   --  6.09*  --    PROCALCITONIN  --   --  0.71*  --    PROTIME  --   --   --  9.4   APTT  --   --  64.0  --    HEPARIN ANTI-XA  --   --  0.57  --          Lab 25  1703 25  1158 25  0744 25  2342   SODIUM 136 134* 135*  --    POTASSIUM 4.5 4.3 4.0  --    CHLORIDE 102 102 102  --    CO2 24.0 24.0 23.0  --    ANION GAP 10.0 8.0 10.0  --    BUN  13.1 16.6 11.1  --    CREATININE 1.10 1.07 0.82  --    EGFR 73.1 75.6 95.7  --    GLUCOSE 217* 346* 295*  --    CALCIUM 8.9 8.6 8.2*  --    MAGNESIUM  --   --   --  1.7*   HEMOGLOBIN A1C  --   --  8.90*  --          Lab 06/14/25  1703 06/12/25  0744   TOTAL PROTEIN 7.1 6.5   ALBUMIN 3.2* 3.3*   GLOBULIN 3.9 3.2   ALT (SGPT) 29 16   AST (SGOT) 32 21   BILIRUBIN 0.3 0.3   ALK PHOS 84 73         Lab 06/11/25  2342   PROTIME 9.4   INR 0.96                 Brief Urine Lab Results  (Last result in the past 365 days)        Color   Clarity   Blood   Leuk Est   Nitrite   Protein   CREAT   Urine HCG        04/14/25 1301 Yellow   Clear   Trace   Negative   Negative   Negative                   Microbiology Results Abnormal       None            No radiology results from the last 24 hrs    Results for orders placed during the hospital encounter of 06/12/25    Adult Transthoracic Echo Complete W/ Cont if Necessary Per Protocol    Interpretation Summary    Left ventricular systolic function is normal. Calculated left ventricular EF = 69.8% Normal left ventricular cavity size noted. Left ventricular wall thickness is consistent with mild concentric hypertrophy. All left ventricular wall segments contract normally. Normal left atrial pressure.    The left atrial cavity is borderline dilated. Left atrial volume is borderline increased.    The aortic valve is grossly normal in structure. No significant aortic valve regurgitation is present. No aortic valve stenosis is present.    The mitral valve is grossly normal in structure. Trace mitral valve regurgitation is present. No significant mitral valve stenosis is present.    Tricuspid valve not well visualized. Trace tricuspid valve regurgitation is present.      Current medications:  Scheduled Meds:apixaban, 5 mg, Oral, Q12H  aspirin, 81 mg, Oral, Daily  carbidopa-levodopa, 1 tablet, Oral, Nightly  ceFAZolin, 2,000 mg, Intravenous, Q8H  empagliflozin, 10 mg, Oral, Daily  finasteride,  5 mg, Oral, Daily  folic acid, 1 mg, Oral, Daily  insulin glargine, 25 Units, Subcutaneous, Daily  insulin lispro, 2-9 Units, Subcutaneous, 4x Daily AC & at Bedtime  linezolid, 600 mg, Oral, Q12H  metoprolol tartrate, 100 mg, Oral, BID  pravastatin, 80 mg, Oral, Daily  sodium chloride, 10 mL, Intravenous, Q12H  tamsulosin, 0.4 mg, Oral, Daily      Continuous Infusions:dilTIAZem, 5-15 mg/hr, Last Rate: 5 mg/hr (06/16/25 1239)      PRN Meds:.  senna-docusate sodium **AND** polyethylene glycol **AND** bisacodyl **AND** bisacodyl    Calcium Replacement - Follow Nurse / BPA Driven Protocol    dextrose    dextrose    glucagon (human recombinant)    HYDROcodone-acetaminophen    Magnesium Standard Dose Replacement - Follow Nurse / BPA Driven Protocol    nitroglycerin    ondansetron ODT **OR** ondansetron    oxyCODONE    Phosphorus Replacement - Follow Nurse / BPA Driven Protocol    Potassium Replacement - Follow Nurse / BPA Driven Protocol    sodium chloride    sodium chloride    Assessment & Plan   Assessment & Plan     Active Hospital Problems    Diagnosis  POA    **Atrial fibrillation [I48.91]  Yes    Primary hypertension [I10]  Unknown    Prediabetes [R73.03]  Unknown    Other hyperlipidemia [E78.49]  Unknown    RLS (restless legs syndrome) [G25.81]  Unknown    Cellulitis of left lower extremity [L03.116]  Unknown      Resolved Hospital Problems   No resolved problems to display.        Brief Hospital Course to date:  Dale Alas is a 69 y.o. male  w history of HTN MAREN, here with chills and dyspnea for a week, newly diagnosed Afib, and new leg swelling and LLE cellulitis    Plan was partially entered by my partner and I have reviewed and updated as appropriate on 6/17/25     Dyspnea  New onset atrial fibrillation  -no PE on CTA  -on Eliquis  -rate control with BB per cardiology  -ECHO normal EF borderline LA enlargement  -FU with Dr Camacho in 6 weeks      LLE cellulitis  LLE pain  History of prosthetic knee   -MRSA PCR  negative   -Rocephin initially, ID consulted, abs changed to Cefazolin and daptomycin (changed to oral linezolid)  -Dr Araujo ortho evaluated knee due to hx of arthroplasty does not think arthrocentesis necessary  -blood cultures NGTD  -BLE duplex negative on admission     DM A1C 8.9   -SSI  -on metformin alone at home.  Added glargine; likely DC on basal insulin  -add SGLT2i    Latest Reference Range & Units 06/16/25 11:15 06/16/25 16:03 06/16/25 20:10 06/17/25 06:59   Glucose 70 - 130 mg/dL 212 (H) 169 (H) 247 (H) 126     RLS  -on sinemet     HTN  -stable, monitor    Expected Discharge Location and Transportation: home   Expected Discharge   Expected Discharge Date: 6/18/2025; Expected Discharge Time:      VTE Prophylaxis:  Pharmacologic & mechanical VTE prophylaxis orders are present.         AM-PAC 6 Clicks Score (PT): 18 (06/17/25 0759)    CODE STATUS:   Code Status and Medical Interventions: CPR (Attempt to Resuscitate); Full Support   Ordered at: 06/12/25 0654     Code Status (Patient has no pulse and is not breathing):    CPR (Attempt to Resuscitate)     Medical Interventions (Patient has pulse or is breathing):    Full Support       YOHANNES Thomas  06/17/25

## 2025-06-17 NOTE — PLAN OF CARE
Goal Outcome Evaluation:           Problem: Adult Inpatient Plan of Care  Goal: Plan of Care Review  Outcome: Progressing  Flowsheets (Taken 6/13/2025 1749 by Yesi Arboleda RN)  Progress: improving  Plan of Care Reviewed With: patient  Goal: Patient-Specific Goal (Individualized)  Outcome: Progressing  Goal: Absence of Hospital-Acquired Illness or Injury  Outcome: Progressing  Intervention: Identify and Manage Fall Risk  Recent Flowsheet Documentation  Taken 6/17/2025 1600 by Milagro Haddad RN  Safety Promotion/Fall Prevention:   safety round/check completed   nonskid shoes/slippers when out of bed  Taken 6/17/2025 1400 by Milagro Haddad RN  Safety Promotion/Fall Prevention:   safety round/check completed   nonskid shoes/slippers when out of bed  Taken 6/17/2025 1200 by Milagro Haddad RN  Safety Promotion/Fall Prevention:   safety round/check completed   nonskid shoes/slippers when out of bed  Taken 6/17/2025 1000 by Milagro Haddad RN  Safety Promotion/Fall Prevention:   safety round/check completed   nonskid shoes/slippers when out of bed  Taken 6/17/2025 0759 by Milagro Haddad RN  Safety Promotion/Fall Prevention:   safety round/check completed   nonskid shoes/slippers when out of bed  Intervention: Prevent Skin Injury  Recent Flowsheet Documentation  Taken 6/17/2025 1600 by Milagro Haddad RN  Body Position: weight shifting  Skin Protection:   transparent dressing maintained   skin sealant/moisture barrier applied  Taken 6/17/2025 1200 by Milagro Haddad RN  Body Position: position changed independently  Skin Protection:   transparent dressing maintained   skin sealant/moisture barrier applied   silicone foam dressing in place  Taken 6/17/2025 1000 by Milagro Haddad RN  Body Position:   weight shifting   position changed independently  Skin Protection:   transparent dressing maintained   skin sealant/moisture barrier applied  Taken 6/17/2025 0759 by Milagro Haddad RN  Body Position:   turned   position changed  independently   weight shifting  Skin Protection:   transparent dressing maintained   skin sealant/moisture barrier applied  Intervention: Prevent and Manage VTE (Venous Thromboembolism) Risk  Recent Flowsheet Documentation  Taken 6/17/2025 0759 by Milagro Haddad RN  VTE Prevention/Management: (eliquis) other (see comments)  Intervention: Prevent Infection  Recent Flowsheet Documentation  Taken 6/17/2025 1600 by Milagro Haddad RN  Infection Prevention:   cohorting utilized   environmental surveillance performed   equipment surfaces disinfected   hand hygiene promoted   personal protective equipment utilized   rest/sleep promoted   single patient room provided   visitors restricted/screened  Taken 6/17/2025 1400 by Milagro Haddad RN  Infection Prevention:   cohorting utilized   environmental surveillance performed   equipment surfaces disinfected   hand hygiene promoted   personal protective equipment utilized   rest/sleep promoted   single patient room provided   visitors restricted/screened  Taken 6/17/2025 1200 by Milagro Haddad RN  Infection Prevention:   cohorting utilized   environmental surveillance performed   equipment surfaces disinfected   hand hygiene promoted   personal protective equipment utilized   rest/sleep promoted   single patient room provided   visitors restricted/screened  Taken 6/17/2025 1000 by Milagro Haddad RN  Infection Prevention:   cohorting utilized   environmental surveillance performed   equipment surfaces disinfected   hand hygiene promoted   personal protective equipment utilized   rest/sleep promoted   single patient room provided   visitors restricted/screened  Taken 6/17/2025 0759 by Milagro Haddad RN  Infection Prevention:   cohorting utilized   environmental surveillance performed   equipment surfaces disinfected   hand hygiene promoted   personal protective equipment utilized   rest/sleep promoted   single patient room provided   visitors restricted/screened  Goal: Optimal  Comfort and Wellbeing  Outcome: Progressing  Intervention: Monitor Pain and Promote Comfort  Flowsheets (Taken 6/15/2025 1400 by Neela Mueller, RN)  Pain Management Interventions: pain management plan reviewed with patient/caregiver  Intervention: Provide Person-Centered Care  Flowsheets (Taken 6/17/2025 0759)  Trust Relationship/Rapport:   thoughts/feelings acknowledged   reassurance provided   questions encouraged   questions answered   empathic listening provided   emotional support provided   choices provided   care explained  Goal: Readiness for Transition of Care  Outcome: Progressing  Intervention: Mutually Develop Transition Plan  Flowsheets  Taken 6/12/2025 0918 by Jeanette Langley, RN  Equipment Currently Used at Home:   cpap   walker, rolling  Concerns to be Addressed:   basic needs   discharge planning  Readmission Within the Last 30 Days: no previous admission in last 30 days  Patient/Family Anticipates Transition to: home with family  Taken 6/12/2025 0625 by Ledy Jacobson, RN  Patient/Family Anticipated Services at Transition: none     Problem: Comorbidity Management  Goal: Blood Glucose Level Within Target Range  Outcome: Progressing  Intervention: Monitor and Manage Glycemia  Flowsheets (Taken 6/16/2025 2000 by Carlos Traylor, RN)  Medication Review/Management: medications reviewed  Goal: Blood Pressure in Desired Range  Outcome: Progressing  Intervention: Maintain Blood Pressure Management  Flowsheets (Taken 6/16/2025 2000 by Carlos Traylor, RN)  Medication Review/Management: medications reviewed     Problem: Skin Injury Risk Increased  Goal: Skin Health and Integrity  Outcome: Progressing  Intervention: Optimize Skin Protection  Flowsheets  Taken 6/17/2025 1700  Activity Management:   activity encouraged   ambulated outside room   ambulated in room   ambulated to bathroom   back to bed  Taken 6/17/2025 1600  Activity Management: activity minimized  Pressure Reduction Techniques:   frequent  weight shift encouraged   heels elevated off bed   positioned off wounds   pressure points protected  Head of Bed (HOB) Positioning: HOB elevated  Pressure Reduction Devices:   specialty bed utilized   pressure-redistributing mattress utilized   positioning supports utilized   heel offloading device utilized  Skin Protection:   transparent dressing maintained   skin sealant/moisture barrier applied  Taken 6/17/2025 1400  Activity Management: activity minimized  Head of Bed (HOB) Positioning: HOB elevated  Taken 6/17/2025 1200  Activity Management: activity minimized  Pressure Reduction Techniques:   frequent weight shift encouraged   heels elevated off bed   positioned off wounds   pressure points protected  Head of Bed (HOB) Positioning: HOB elevated  Pressure Reduction Devices:   specialty bed utilized   pressure-redistributing mattress utilized   positioning supports utilized  Skin Protection:   transparent dressing maintained   skin sealant/moisture barrier applied   silicone foam dressing in place  Taken 6/17/2025 1000  Activity Management: activity minimized  Pressure Reduction Techniques:   frequent weight shift encouraged   heels elevated off bed   positioned off wounds   pressure points protected   rest period provided between sit times  Head of Bed (HOB) Positioning: HOB elevated  Pressure Reduction Devices:   specialty bed utilized   pressure-redistributing mattress utilized   positioning supports utilized  Skin Protection:   transparent dressing maintained   skin sealant/moisture barrier applied  Taken 6/17/2025 0900  Activity Management: ambulated in room  Taken 6/17/2025 0759  Activity Management: activity minimized  Pressure Reduction Techniques:   frequent weight shift encouraged   heels elevated off bed   positioned off wounds   pressure points protected   rest period provided between sit times  Head of Bed (HOB) Positioning: HOB elevated  Pressure Reduction Devices:   specialty bed utilized    pressure-redistributing mattress utilized   positioning supports utilized   heel offloading device utilized  Skin Protection:   transparent dressing maintained   skin sealant/moisture barrier applied     Problem: Dysrhythmia  Goal: Normalized Cardiac Rhythm  Outcome: Progressing  Intervention: Monitor and Manage Cardiac Rhythm Effect  Recent Flowsheet Documentation  Taken 6/17/2025 3682 by Milagro Haddad RN  VTE Prevention/Management: (eliquis) other (see comments)

## 2025-06-17 NOTE — PLAN OF CARE
Problem: Adult Inpatient Plan of Care  Goal: Absence of Hospital-Acquired Illness or Injury  Intervention: Identify and Manage Fall Risk  Recent Flowsheet Documentation  Taken 6/16/2025 2200 by Carlos Traylor RN  Safety Promotion/Fall Prevention:   nonskid shoes/slippers when out of bed   safety round/check completed  Taken 6/16/2025 2000 by Carlos Traylor RN  Safety Promotion/Fall Prevention:   activity supervised   assistive device/personal items within reach   clutter free environment maintained   fall prevention program maintained   lighting adjusted   nonskid shoes/slippers when out of bed   room organization consistent   safety round/check completed  Intervention: Prevent Skin Injury  Recent Flowsheet Documentation  Taken 6/16/2025 2000 by Carlos Traylor RN  Body Position: position changed independently  Intervention: Prevent Infection  Recent Flowsheet Documentation  Taken 6/16/2025 2000 by Carlos Traylor, RN  Infection Prevention:   environmental surveillance performed   rest/sleep promoted   single patient room provided   Goal Outcome Evaluation:

## 2025-06-17 NOTE — PROGRESS NOTES
"  INFECTIOUS DISEASES  INPATIENT PROGRESS NOTE  2025      PATIENT NAME: Dale Alas  :  1955  MRN:  [id number]  Date of Admission:  2025      Antimicrobials:  ceFAZolin 2000 mg IVPB in 100 mL NS (MBP)  linezolid - 600 MG      MAR reviewed.     Reason for consultation: Left leg cellulitis    Interval history: Daptomycin changed to linezolid yesterday.  Tolerating fine.  No fevers.  Knee feels fine still.  Ankle and calf pain/swelling ongoing.  Back on diltiazem drip for Afib.    ROS:  No fevers/chills overnight.  No new rashes.  No SOB or chest pain.  No nausea/vomiting/diarrhea.  Denies side effects from antimicrobials.     Objective:  Temp (24hrs), Av.8 °F (37.1 °C), Min:97.8 °F (36.6 °C), Max:99.3 °F (37.4 °C)    /51 (BP Location: Left arm, Patient Position: Lying)   Pulse 73   Temp 97.8 °F (36.6 °C) (Oral)   Resp 20   Ht 189.2 cm (74.49\")   Wt (!) 149 kg (328 lb 7.8 oz)   SpO2 92%   BMI 41.62 kg/m²     Physical Examination:  GENERAL: Improved appearing male.  Awake and alert, in no acute distress. Obese.  HEENT: Normocephalic, atraumatic.  LUNGS: Normal respiratory effort.  ABDOMEN: Soft, nontender, nondistended.  No rebound or guarding.  Obese  EXT:  +left leg edema - ongoing improvement around knee   MSK: Good movement of left knee and ankle without pain.  SKIN: No lesions of the left foot.  Ongoing redness and warmth of the left ankle and posterior calf; better more superiorly near knee.  Scabbed lesions of the left knee and the left anterior shin are healing and stable.  No drainage.  NEURO: A&Ox4. No focal deficits.  Face symmetric.  Speech fluent.  Moves all extremities well.   PSYCHIATRIC: Normal insight and judgement.  Cooperative.  Normal affect.    Laboratory Data:    Results from last 7 days   Lab Units 06/15/25  2036 06/13/25  1158 25  0744   WBC 10*3/mm3 9.10 9.40 10.24  9.87   HEMOGLOBIN g/dL 12.8* 11.4* 11.9*  11.9*   HEMATOCRIT % 38.8 35.5* 36.2*  35.7* "   PLATELETS 10*3/mm3 201 192 190  182     Results from last 7 days   Lab Units 06/14/25  1703   SODIUM mmol/L 136   POTASSIUM mmol/L 4.5   CHLORIDE mmol/L 102   CO2 mmol/L 24.0   BUN mg/dL 13.1   CREATININE mg/dL 1.10   GLUCOSE mg/dL 217*   CALCIUM mg/dL 8.9     Results from last 7 days   Lab Units 06/14/25  1703   ALK PHOS U/L 84   BILIRUBIN mg/dL 0.3   ALT (SGPT) U/L 29   AST (SGOT) U/L 32         Results from last 7 days   Lab Units 06/12/25  0744   CRP mg/dL 6.09*     Estimated Creatinine Clearance: 100 mL/min (by C-G formula based on SCr of 1.1 mg/dL).      Results from last 7 days   Lab Units 06/14/25  1703   CK TOTAL U/L 90               Microbiology:    Microbiology Results (last 10 days)       Procedure Component Value - Date/Time    Wound Culture - Wound, Leg, Left [[id number]] Collected: 06/13/25 1153    Lab Status: Final result Specimen: Wound from Leg, Left Updated: 06/15/25 0803     Wound Culture Moderate growth (3+) Normal Skin Cortney     Gram Stain Few (2+) WBCs seen      Moderate (3+) Gram positive cocci in pairs    Blood Culture - Blood, Hand, Right [[id number]]  (Normal) Collected: 06/12/25 0750    Lab Status: Final result Specimen: Blood from Hand, Right Updated: 06/17/25 0815     Blood Culture No growth at 5 days    Narrative:      Less than seven (7) mL's of blood was collected.  Insufficient quantity may yield false negative results.    Blood Culture - Blood, Hand, Left [[id number]]  (Normal) Collected: 06/12/25 0744    Lab Status: Final result Specimen: Blood from Hand, Left Updated: 06/17/25 0815     Blood Culture No growth at 5 days    Narrative:      Less than seven (7) mL's of blood was collected.  Insufficient quantity may yield false negative results.    MRSA Screen, PCR (Inpatient) - Swab, Nares [[id number]]  (Normal) Collected: 06/12/25 0724    Lab Status: Final result Specimen: Swab from Nares Updated: 06/12/25 0858     MRSA PCR Negative    Narrative:      The negative predictive value  of this diagnostic test is high and should only be used to consider de-escalating anti-MRSA therapy. A positive result may indicate colonization with MRSA and must be correlated clinically.  MRSA Negative              Radiology:  No radiology results for the last day          DISCUSSION:  69 y.o. male with history of obesity, uncontrolled diabetes, and remote left knee arthroplasty admitted with left leg cellulitis.      PROBLEM LIST:   -- Left leg cellulitis.  Seems to have started after abrasion on the shin.  Small mild purulence able to be expressed from that wound.  Likely staphylococcal or streptococcal infection.  Wound culture with normal skin nathanael.  Risk to develop left knee periprosthetic joint infection but no effusion or pain.  No fluid collection on CT.  -- History of remote left knee arthroplasty, last in 2013 by Dr. Matias at [facility].  He reports he has had 3 total surgeries on his left knee, the first arthroplasty followed by 2 revisions due to mechanical issues and not infection.  Orthopedics evaluated and low suspicion for PJI as no pain or effusion; avoid aspiration with overlying cellulitis.  -- History of left lower leg orthopedic hardware, status post removal several years ago.  Details unknown.  -- History of left leg vein procedure.  Duplex this admission negative for DVT.  -- Atrial fibrillation, new onset.  Exacerbated by current infection.  Failed HELLEN, inability to pass probe.  -- Uncontrolled diabetes mellitus type 2.  Contributing to propensity for infection and poor wound healing.    PLAN:  -- Continue cefazolin 2 g iv q8h  -- Continue linezolid 600 mg po bid  -- Follow exam.  Slow improvement.    -- Low suspicion for PJI but continue to monitor knee.  -- Keep leg elevated  -- Plan 1-2 days more in hospital and discharge home with oral linezolid.    Plan discussed with patient.    This visit included the following complex service elements:  Complex medical  decision-making associated with antimicrobial prescribing.  In-depth chart review with high level synthesis for complex diagnoses.      Carlos Dumont MD  6/17/2025  11:15 EDT

## 2025-06-18 LAB
GLUCOSE BLDC GLUCOMTR-MCNC: 145 MG/DL (ref 70–130)
GLUCOSE BLDC GLUCOMTR-MCNC: 175 MG/DL (ref 70–130)
GLUCOSE BLDC GLUCOMTR-MCNC: 203 MG/DL (ref 70–130)
GLUCOSE BLDC GLUCOMTR-MCNC: 98 MG/DL (ref 70–130)

## 2025-06-18 PROCEDURE — 63710000001 INSULIN LISPRO (HUMAN) PER 5 UNITS: Performed by: HOSPITALIST

## 2025-06-18 PROCEDURE — 99232 SBSQ HOSP IP/OBS MODERATE 35: CPT | Performed by: INTERNAL MEDICINE

## 2025-06-18 PROCEDURE — 97110 THERAPEUTIC EXERCISES: CPT

## 2025-06-18 PROCEDURE — 63710000001 INSULIN GLARGINE PER 5 UNITS: Performed by: INTERNAL MEDICINE

## 2025-06-18 PROCEDURE — 97530 THERAPEUTIC ACTIVITIES: CPT

## 2025-06-18 PROCEDURE — 82948 REAGENT STRIP/BLOOD GLUCOSE: CPT

## 2025-06-18 PROCEDURE — 25010000002 CEFAZOLIN PER 500 MG: Performed by: INTERNAL MEDICINE

## 2025-06-18 RX ADMIN — LINEZOLID 600 MG: 600 TABLET, FILM COATED ORAL at 20:43

## 2025-06-18 RX ADMIN — HYDROCODONE BITARTRATE AND ACETAMINOPHEN 1 TABLET: 5; 325 TABLET ORAL at 10:22

## 2025-06-18 RX ADMIN — SODIUM CHLORIDE 2000 MG: 900 INJECTION INTRAVENOUS at 05:50

## 2025-06-18 RX ADMIN — FOLIC ACID 1 MG: 1 TABLET ORAL at 08:37

## 2025-06-18 RX ADMIN — LINEZOLID 600 MG: 600 TABLET, FILM COATED ORAL at 08:38

## 2025-06-18 RX ADMIN — HYDROCODONE BITARTRATE AND ACETAMINOPHEN 1 TABLET: 5; 325 TABLET ORAL at 00:11

## 2025-06-18 RX ADMIN — PRAVASTATIN SODIUM 80 MG: 40 TABLET ORAL at 08:39

## 2025-06-18 RX ADMIN — METOPROLOL TARTRATE 100 MG: 100 TABLET, FILM COATED ORAL at 20:43

## 2025-06-18 RX ADMIN — SODIUM CHLORIDE 2000 MG: 900 INJECTION INTRAVENOUS at 20:43

## 2025-06-18 RX ADMIN — APIXABAN 5 MG: 5 TABLET, FILM COATED ORAL at 08:39

## 2025-06-18 RX ADMIN — Medication 10 ML: at 08:44

## 2025-06-18 RX ADMIN — SODIUM CHLORIDE 2000 MG: 900 INJECTION INTRAVENOUS at 15:52

## 2025-06-18 RX ADMIN — METOPROLOL TARTRATE 100 MG: 100 TABLET, FILM COATED ORAL at 08:37

## 2025-06-18 RX ADMIN — APIXABAN 5 MG: 5 TABLET, FILM COATED ORAL at 20:43

## 2025-06-18 RX ADMIN — INSULIN LISPRO 4 UNITS: 100 INJECTION, SOLUTION INTRAVENOUS; SUBCUTANEOUS at 17:21

## 2025-06-18 RX ADMIN — CARBIDOPA AND LEVODOPA 1 TABLET: 25; 100 TABLET ORAL at 20:43

## 2025-06-18 RX ADMIN — TAMSULOSIN HYDROCHLORIDE 0.4 MG: 0.4 CAPSULE ORAL at 08:37

## 2025-06-18 RX ADMIN — ASPIRIN 81 MG: 81 TABLET, COATED ORAL at 08:44

## 2025-06-18 RX ADMIN — INSULIN GLARGINE 25 UNITS: 100 INJECTION, SOLUTION SUBCUTANEOUS at 08:39

## 2025-06-18 RX ADMIN — FINASTERIDE 5 MG: 5 TABLET, FILM COATED ORAL at 08:38

## 2025-06-18 RX ADMIN — EMPAGLIFLOZIN 10 MG: 10 TABLET, FILM COATED ORAL at 08:38

## 2025-06-18 RX ADMIN — INSULIN LISPRO 2 UNITS: 100 INJECTION, SOLUTION INTRAVENOUS; SUBCUTANEOUS at 20:43

## 2025-06-18 NOTE — PLAN OF CARE
Goal Outcome Evaluation:  Plan of Care Reviewed With: patient        Progress: declining  Outcome Evaluation: Pt required increased assistance with functional transfers and limited mobility due to LLE pain and tightness. Encouraged pt to continue to perform LLE ther-ex daily and mobilize as able with RN staff. Cont POC.    Anticipated Discharge Disposition (OT): inpatient rehabilitation facility

## 2025-06-18 NOTE — THERAPY TREATMENT NOTE
Patient Name: Dale Alas  : 1955    MRN: [id number]                              Today's Date: 2025       Admit Date: 2025    Visit Dx: No diagnosis found.  Patient Active Problem List   Diagnosis    Encounter for screening for malignant neoplasm of colon    Atrial fibrillation    Primary hypertension    Prediabetes    Other hyperlipidemia    RLS (restless legs syndrome)    Cellulitis of left lower extremity     Past Medical History:   Diagnosis Date    Arthritis     Diabetes     Hypercholesteremia     MI (myocardial infarction)     Sleep apnea     CPAP    Wears glasses      Past Surgical History:   Procedure Laterality Date    COLONOSCOPY      over 10 years ago    GALLBLADDER SURGERY N/A 2015    REPLACEMENT TOTAL KNEE Left       General Information       Row Name 25 1426          Physical Therapy Time and Intention    Document Type therapy note (daily note)  -KG     Mode of Treatment physical therapy  -KG       Row Name 25 142          General Information    Existing Precautions/Restrictions fall;other (see comments)  L LE cellulitis; painful L LE; minimal weight bearing on L LE  -KG       Row Name 25 1426          Cognition    Orientation Status (Cognition) oriented x 4  -KG       Row Name 25 1426          Safety Issues/Impairments Affecting Functional Mobility    Safety Issues Affecting Function (Mobility) awareness of need for assistance;insight into deficits/self-awareness;safety precaution awareness;safety precautions follow-through/compliance  -KG     Impairments Affecting Function (Mobility) balance;coordination;endurance/activity tolerance;pain;postural/trunk control;strength  -KG               User Key  (r) = Recorded By, (t) = Taken By, (c) = Cosigned By      Initials Name Provider Type    KG Kathy Cisneros, PT Physical Therapist                   Mobility       Row Name 25 1427          Bed Mobility    Bed Mobility supine-sit;sit-supine   -KG     Supine-Sit Drummond (Bed Mobility) standby assist;verbal cues  -KG     Sit-Supine Drummond (Bed Mobility) standby assist;verbal cues  -KG     Assistive Device (Bed Mobility) bed rails;head of bed elevated  -KG     Comment, (Bed Mobility) VC's for sequencing. Pt did not require any physical assistance.  -KG       Row Name 06/18/25 1427          Transfers    Comment, (Transfers) VC's for sequencing and safe hand placement. Pt very unsteady upon initial stand and returned to seated position at EOB. Upon second stand pt continued to demonstrate minimal weight bearing on L LE with very forward flexed posture. Required cues to correct.  -KG       Row Name 06/18/25 1427          Sit-Stand Transfer    Sit-Stand Drummond (Transfers) minimum assist (75% patient effort);verbal cues  -KG     Assistive Device (Sit-Stand Transfers) walker, front-wheeled  -KG       Row Name 06/18/25 1427          Gait/Stairs (Locomotion)    Drummond Level (Gait) minimum assist (75% patient effort);verbal cues  -KG     Assistive Device (Gait) walker, front-wheeled  -KG     Distance in Feet (Gait) 70  -KG     Deviations/Abnormal Patterns (Gait) left sided deviations;base of support, wide;robi decreased;stride length decreased  -KG     Bilateral Gait Deviations forward flexed posture  -KG     Left Sided Gait Deviations weight shift ability decreased  -KG     Comment, (Gait/Stairs) Pt demonstrated step through gait pattern with slow robi and wide DARRYN. Pt with very forward flexed posture and tendency to lean forward onto RW for increased weight bearing through B UEs. Consistent cues required to correct. Pt with minimal weight bearing on L LE throughout ambulation. Required one standing rest break. Ambulation distance limited by L LE pain.  -KG               User Key  (r) = Recorded By, (t) = Taken By, (c) = Cosigned By      Initials Name Provider Type    KG Kathy Cisneros, PT Physical Therapist                    Obj/Interventions       Row Name 06/18/25 1430          Balance    Balance Assessment sitting static balance;standing static balance;standing dynamic balance  -KG     Static Sitting Balance standby assist  -KG     Position, Sitting Balance unsupported;sitting edge of bed  -KG     Static Standing Balance contact guard  -KG     Dynamic Standing Balance minimal assist  -KG     Position/Device Used, Standing Balance supported;walker, rolling  -KG               User Key  (r) = Recorded By, (t) = Taken By, (c) = Cosigned By      Initials Name Provider Type    KG Kathy Cisneros, PT Physical Therapist                   Goals/Plan    No documentation.                  Clinical Impression       Row Name 06/18/25 1431          Pain    Pretreatment Pain Rating 3/10  -KG     Posttreatment Pain Rating 8/10  -KG     Pain Location extremity  -KG     Pain Side/Orientation left;lower  -KG     Pain Management Interventions exercise or physical activity utilized  -KG     Response to Pain Interventions activity participation with increased pain  -KG       Row Name 06/18/25 1431          Plan of Care Review    Plan of Care Reviewed With patient  -KG     Progress declining  -KG     Outcome Evaluation Pt required Shruthi for STS transfers and ambulated 70ft with RW and Shruthi 1+1. Pt very unsteady at times with wide DARRYN and very forward flexed posture. Minimal weight bearing on L LE throughout ambulation. Required consistent cues for proper hand placement and management of RW. Required one standing rest break. Ambulation distance limited by c/o increased L LE pain. Continue to recommend PT skilled care and D/C to IP rehab facility.  -KG       Row Name 06/18/25 1431          Vital Signs    Pre Systolic BP Rehab 164  -KG     Pre Treatment Diastolic BP 86  -KG     Pretreatment Heart Rate (beats/min) 71  -KG     Posttreatment Heart Rate (beats/min) 68  -KG     Pre SpO2 (%) 97  -KG     O2 Delivery Pre Treatment room air  -KG     Post SpO2 (%)  97  -KG     O2 Delivery Post Treatment room air  -KG     Pre Patient Position Supine  -KG     Intra Patient Position Standing  -KG     Post Patient Position Supine  -KG       Row Name 06/18/25 1431          Positioning and Restraints    Pre-Treatment Position in bed  -KG     Post Treatment Position bed  -KG     In Bed notified nsg;supine;call light within reach;encouraged to call for assist;exit alarm on;side rails up x2  -KG               User Key  (r) = Recorded By, (t) = Taken By, (c) = Cosigned By      Initials Name Provider Type    Kathy Bahena, PT Physical Therapist                   Outcome Measures       Row Name 06/18/25 1433 06/18/25 0732       How much help from another person do you currently need...    Turning from your back to your side while in flat bed without using bedrails? 3  -KG 4  -CM    Moving from lying on back to sitting on the side of a flat bed without bedrails? 3  -KG 4  -CM    Moving to and from a bed to a chair (including a wheelchair)? 3  -KG 4  -CM    Standing up from a chair using your arms (e.g., wheelchair, bedside chair)? 3  -KG 4  -CM    Climbing 3-5 steps with a railing? 2  -KG 4  -CM    To walk in hospital room? 3  -KG 4  -CM    AM-PAC 6 Clicks Score (PT) 17  -KG 24  -CM    Highest Level of Mobility Goal Stand (1 or More Minutes)-5  -KG Walk 250 Feet or More - 8  -CM      Row Name 06/18/25 1433 06/18/25 1153       Functional Assessment    Outcome Measure Options AM-PAC 6 Clicks Basic Mobility (PT)  -KG AM-PAC 6 Clicks Daily Activity (OT)  -AN              User Key  (r) = Recorded By, (t) = Taken By, (c) = Cosigned By      Initials Name Provider Type    Kathy Bahena, LACEY Physical Therapist    Lydia Smith OT Occupational Therapist    Velvet Carroll, RN Registered Nurse                                 Physical Therapy Education       Title: PT OT SLP Therapies (In Progress)       Topic: Physical Therapy (In Progress)       Point: Mobility  training (In Progress)       Learning Progress Summary            Patient Acceptance, E, NR by KG at 6/18/2025 1032    Acceptance, E, VU by EC at 6/17/2025 1316    Acceptance, E, NR by SUNSHINE at 6/13/2025 1337                      Point: Home exercise program (In Progress)       Learning Progress Summary            Patient Acceptance, E, NR by KG at 6/18/2025 1032    Acceptance, E, VU by EC at 6/17/2025 1316    Acceptance, E, NR by SUNSHINE at 6/13/2025 1337                      Point: Body mechanics (In Progress)       Learning Progress Summary            Patient Acceptance, E, NR by KG at 6/18/2025 1032    Acceptance, E, VU by EC at 6/17/2025 1316    Acceptance, E, NR by SUNSHINE at 6/13/2025 1337                      Point: Precautions (In Progress)       Learning Progress Summary            Patient Acceptance, E, NR by KG at 6/18/2025 1032    Acceptance, E, VU by EC at 6/17/2025 1316    Acceptance, E, NR by SUNSHINE at 6/13/2025 1337                                      User Key       Initials Effective Dates Name Provider Type Discipline    SUNSHINE 02/03/23 -  Marissa Costa, PT Physical Therapist PT    KG 05/22/20 -  Kathy Cisneros, PT Physical Therapist PT    EC 04/03/25 -  Milagro Haddad, DEBBI Registered Nurse Nurse                  PT Recommendation and Plan     Progress: declining  Outcome Evaluation: Pt required Shruthi for STS transfers and ambulated 70ft with RW and Shruthi 1+1. Pt very unsteady at times with wide DARRYN and very forward flexed posture. Minimal weight bearing on L LE throughout ambulation. Required consistent cues for proper hand placement and management of RW. Required one standing rest break. Ambulation distance limited by c/o increased L LE pain. Continue to recommend PT skilled care and D/C to IP rehab facility.     Time Calculation:         PT Charges       Row Name 06/18/25 1032             Time Calculation    Start Time 1032  -KG      PT Received On 06/18/25  -KG      PT Goal Re-Cert Due Date 06/23/25  -KG          Time Calculation- PT    Total Timed Code Minutes- PT 12 minute(s)  -KG         Timed Charges    87155 - PT Therapeutic Activity Minutes 12  -KG         Total Minutes    Timed Charges Total Minutes 12  -KG       Total Minutes 12  -KG                User Key  (r) = Recorded By, (t) = Taken By, (c) = Cosigned By      Initials Name Provider Type    KG Kathy Cisneros, PT Physical Therapist                  Therapy Charges for Today       Code Description Service Date Service Provider Modifiers Qty    98511130948 HC PT THERAPEUTIC ACT EA 15 MIN 6/18/2025 Kathy Cisneros, PT GP 1            PT G-Codes  Outcome Measure Options: AM-PAC 6 Clicks Basic Mobility (PT)  AM-PAC 6 Clicks Score (PT): 17  AM-PAC 6 Clicks Score (OT): 16  PT Discharge Summary  Anticipated Discharge Disposition (PT): inpatient rehabilitation facility    Rosanna Cisneros, LACEY  6/18/2025

## 2025-06-18 NOTE — CASE MANAGEMENT/SOCIAL WORK
Continued Stay Note  [facility]     Patient Name: Dale Alas  MRN: [id number]  Today's Date: 6/18/2025    Admit Date: 6/12/2025    Plan: home   Discharge Plan       Row Name 06/18/25 0841       Plan    Plan home    Patient/Family in Agreement with Plan yes    Plan Comments I met wiht this patient bedside. His plan remains home with his son to transport. He is still having pain in his leg and on IV antibiotics. He will hopefully go home on oral antibiotics. CM to follow.    Final Discharge Disposition Code 01 - home or self-care      Row Name 06/18/25 0836       Plan    Plan home    Patient/Family in Agreement with Plan yes    Plan Comments I met with this patient bedside. His plan remains home with his son to transport. He is still complaining of leg pain.    Final Discharge Disposition Code 01 - home or self-care                   Discharge Codes    No documentation.                 Expected Discharge Date and Time       Expected Discharge Date Expected Discharge Time    Jun 18, 2025               Jeanette Langley RN

## 2025-06-18 NOTE — PROGRESS NOTES
[facility] Medicine Services  PROGRESS NOTE    Patient Name: Dale Alas  : 1955  MRN: [id number]    Date of Admission: 2025  Primary Care Physician: Guillermo Enciso MD    Subjective   Subjective     CC:  LLE pain     HPI:   still with pain and swelling.        Objective   Objective     Vital Signs:   Temp:  [98 °F (36.7 °C)-99.1 °F (37.3 °C)] 98.5 °F (36.9 °C)  Heart Rate:  [62-99] 62  Resp:  [18] 18  BP: (138-164)/(65-86) 164/86     Physical Exam:  Constitutional: No acute distress, awake, alert  HENT: NCAT, mucous membranes moist  Respiratory: Clear to auscultation bilaterally, respiratory effort normal   Cardiovascular: irregular-afib on tele, no murmurs, rubs, or gallops  Gastrointestinal: Positive bowel sounds, soft, nontender, nondistended, obese  Musculoskeletal: signif erythema and edema persists LLE, tender.  Redness has regressed but slow to improve overall.    Psychiatric: Appropriate affect, cooperative  Neurologic: Oriented x 3, ALEXIS, speech clear      Results Reviewed:  LAB RESULTS:      Lab 06/15/25  2036 25  1158 25  0744 25  2342   WBC 9.10 9.40 10.24  9.87  --    HEMOGLOBIN 12.8* 11.4* 11.9*  11.9*  --    HEMATOCRIT 38.8 35.5* 36.2*  35.7*  --    PLATELETS 201 192 190  182  --    NEUTROS ABS  --  7.07* 7.44*  7.16*  --    IMMATURE GRANS (ABS)  --  0.05 0.05  0.05  --    LYMPHS ABS  --  1.59 1.89  1.88  --    MONOS ABS  --  0.57 0.74  0.63  --    EOS ABS  --  0.09 0.09  0.09  --    MCV 84.7 85.5 84.8  83.8  --    CRP  --   --  6.09*  --    PROCALCITONIN  --   --  0.71*  --    PROTIME  --   --   --  9.4   APTT  --   --  64.0  --    HEPARIN ANTI-XA  --   --  0.57  --          Lab 25  1703 25  1158 25  0744 25  2342   SODIUM 136 134* 135*  --    POTASSIUM 4.5 4.3 4.0  --    CHLORIDE 102 102 102  --    CO2 24.0 24.0 23.0  --    ANION GAP 10.0 8.0 10.0  --    BUN 13.1 16.6 11.1  --    CREATININE 1.10  1.07 0.82  --    EGFR 73.1 75.6 95.7  --    GLUCOSE 217* 346* 295*  --    CALCIUM 8.9 8.6 8.2*  --    MAGNESIUM  --   --   --  1.7*   HEMOGLOBIN A1C  --   --  8.90*  --          Lab 06/14/25  1703 06/12/25  0744   TOTAL PROTEIN 7.1 6.5   ALBUMIN 3.2* 3.3*   GLOBULIN 3.9 3.2   ALT (SGPT) 29 16   AST (SGOT) 32 21   BILIRUBIN 0.3 0.3   ALK PHOS 84 73         Lab 06/11/25  2342   PROTIME 9.4   INR 0.96                 Brief Urine Lab Results  (Last result in the past 365 days)        Color   Clarity   Blood   Leuk Est   Nitrite   Protein   CREAT   Urine HCG        04/14/25 1301 Yellow   Clear   Trace   Negative   Negative   Negative                   Microbiology Results Abnormal       None            No radiology results from the last 24 hrs    Results for orders placed during the hospital encounter of 06/12/25    Adult Transthoracic Echo Complete W/ Cont if Necessary Per Protocol    Interpretation Summary    Left ventricular systolic function is normal. Calculated left ventricular EF = 69.8% Normal left ventricular cavity size noted. Left ventricular wall thickness is consistent with mild concentric hypertrophy. All left ventricular wall segments contract normally. Normal left atrial pressure.    The left atrial cavity is borderline dilated. Left atrial volume is borderline increased.    The aortic valve is grossly normal in structure. No significant aortic valve regurgitation is present. No aortic valve stenosis is present.    The mitral valve is grossly normal in structure. Trace mitral valve regurgitation is present. No significant mitral valve stenosis is present.    Tricuspid valve not well visualized. Trace tricuspid valve regurgitation is present.      Current medications:  Scheduled Meds:apixaban, 5 mg, Oral, Q12H  aspirin, 81 mg, Oral, Daily  carbidopa-levodopa, 1 tablet, Oral, Nightly  ceFAZolin, 2,000 mg, Intravenous, Q8H  empagliflozin, 10 mg, Oral, Daily  finasteride, 5 mg, Oral, Daily  folic acid, 1 mg,  Oral, Daily  insulin glargine, 25 Units, Subcutaneous, Daily  insulin lispro, 2-9 Units, Subcutaneous, 4x Daily AC & at Bedtime  linezolid, 600 mg, Oral, Q12H  metoprolol tartrate, 100 mg, Oral, BID  pravastatin, 80 mg, Oral, Daily  sodium chloride, 10 mL, Intravenous, Q12H  tamsulosin, 0.4 mg, Oral, Daily      Continuous Infusions:dilTIAZem, 5-15 mg/hr, Last Rate: Stopped (06/17/25 1120)      PRN Meds:.  senna-docusate sodium **AND** polyethylene glycol **AND** bisacodyl **AND** bisacodyl    Calcium Replacement - Follow Nurse / BPA Driven Protocol    dextrose    dextrose    glucagon (human recombinant)    HYDROcodone-acetaminophen    Magnesium Standard Dose Replacement - Follow Nurse / BPA Driven Protocol    nitroglycerin    ondansetron ODT **OR** ondansetron    oxyCODONE    Phosphorus Replacement - Follow Nurse / BPA Driven Protocol    Potassium Replacement - Follow Nurse / BPA Driven Protocol    sodium chloride    sodium chloride    Assessment & Plan   Assessment & Plan     Active Hospital Problems    Diagnosis  POA    **Atrial fibrillation [I48.91]  Yes    Primary hypertension [I10]  Unknown    Prediabetes [R73.03]  Unknown    Other hyperlipidemia [E78.49]  Unknown    RLS (restless legs syndrome) [G25.81]  Unknown    Cellulitis of left lower extremity [L03.116]  Unknown      Resolved Hospital Problems   No resolved problems to display.        Brief Hospital Course to date:  Dale Alas is a 69 y.o. male  w history of HTN MAREN, here with chills and dyspnea for a week, newly diagnosed Afib, and new leg swelling and LLE cellulitis    Plan was partially entered by my partner and I have reviewed and updated as appropriate on 6/17/25     Dyspnea  New onset atrial fibrillation  -no PE on CTA  -on Eliquis  -rate control with BB per cardiology  -ECHO normal EF borderline LA enlargement  -FU with Dr Camacho in 6 weeks      LLE cellulitis  LLE pain  History of prosthetic knee   -MRSA PCR negative   -Rocephin initially, ID  consulted, abs changed to Cefazolin and daptomycin (changed to oral linezolid)  -Dr Gayle tobias evaluated knee due to hx of arthroplasty does not think arthrocentesis necessary  -blood cultures NGTD  -BLE duplex negative on admission     DM A1C 8.9   -SSI  -on metformin alone at home.  Added glargine; likely DC on basal insulin    RLS -on sinemet     HTN  -stable, monitor    Expected Discharge Location and Transportation: home   Expected Discharge   Expected Discharge Date: 6/18/2025; Expected Discharge Time:      VTE Prophylaxis:  Pharmacologic & mechanical VTE prophylaxis orders are present.         AM-PAC 6 Clicks Score (PT): 18 (06/17/25 7064)    CODE STATUS:   Code Status and Medical Interventions: CPR (Attempt to Resuscitate); Full Support   Ordered at: 06/12/25 0632     Code Status (Patient has no pulse and is not breathing):    CPR (Attempt to Resuscitate)     Medical Interventions (Patient has pulse or is breathing):    Full Support       Gerri Greenfield MD  06/18/25

## 2025-06-18 NOTE — PLAN OF CARE
Goal Outcome Evaluation:  Plan of Care Reviewed With: patient        Progress: declining  Outcome Evaluation: Pt required Shruthi for STS transfers and ambulated 70ft with RW and Shruthi 1+1. Pt very unsteady at times with wide DARRYN and very forward flexed posture. Minimal weight bearing on L LE throughout ambulation. Required consistent cues for proper hand placement and management of RW. Required one standing rest break. Ambulation distance limited by c/o increased L LE pain. Continue to recommend PT skilled care and D/C to IP rehab facility.    Anticipated Discharge Disposition (PT): inpatient rehabilitation facility                         no

## 2025-06-18 NOTE — THERAPY TREATMENT NOTE
Patient Name: Dlae Alas  : 1955    MRN: [id number]                              Today's Date: 2025       Admit Date: 2025    Visit Dx: No diagnosis found.  Patient Active Problem List   Diagnosis    Encounter for screening for malignant neoplasm of colon    Atrial fibrillation    Primary hypertension    Prediabetes    Other hyperlipidemia    RLS (restless legs syndrome)    Cellulitis of left lower extremity     Past Medical History:   Diagnosis Date    Arthritis     Diabetes     Hypercholesteremia     MI (myocardial infarction)     Sleep apnea     CPAP    Wears glasses      Past Surgical History:   Procedure Laterality Date    COLONOSCOPY      over 10 years ago    GALLBLADDER SURGERY N/A 2015    REPLACEMENT TOTAL KNEE Left       General Information       Row Name 25 1148          OT Time and Intention    Mode of Treatment occupational therapy  -AN       Row Name 25 1148          General Information    Patient Profile Reviewed yes  -AN     Existing Precautions/Restrictions fall  -AN     Barriers to Rehab medically complex  -AN       Row Name 25 1148          Cognition    Orientation Status (Cognition) oriented x 4  -AN       Row Name 25 1148          Safety Issues/Impairments Affecting Functional Mobility    Safety Issues Affecting Function (Mobility) safety precaution awareness;safety precautions follow-through/compliance;insight into deficits/self-awareness;positioning of assistive device  -AN     Impairments Affecting Function (Mobility) balance;endurance/activity tolerance;strength  -AN               User Key  (r) = Recorded By, (t) = Taken By, (c) = Cosigned By      Initials Name Provider Type    Lydai Smith OT Occupational Therapist                     Mobility/ADL's       Row Name 25 1148          Bed Mobility    Bed Mobility supine-sit-supine  -AN     Supine-Sit-Supine [place] (Bed Mobility) standby assist  -AN     Assistive Device (Bed  Mobility) head of bed elevated  -AN       Row Name 06/18/25 1148          Transfers    Transfers sit-stand transfer;stand-sit transfer  -AN       Row Name 06/18/25 1148          Sit-Stand Transfer    Sit-Stand [place] (Transfers) contact guard  -AN     Assistive Device (Sit-Stand Transfers) walker, front-wheeled  -AN       Row Name 06/18/25 1148          Stand-Sit Transfer    Stand-Sit [place] (Transfers) contact guard  -AN     Assistive Device (Stand-Sit Transfers) walker, front-wheeled  -AN       Row Name 06/18/25 1148          Functional Mobility    Functional Mobility- Ind. Level unable to perform  -AN     Functional Mobility- Comment limited by LLE pain this session  -AN               User Key  (r) = Recorded By, (t) = Taken By, (c) = Cosigned By      Initials Name Provider Type    Lydia Smith OT Occupational Therapist                   Obj/Interventions       Row Name 06/18/25 1149          Balance    Balance Assessment sitting static balance;sitting dynamic balance;sit to stand dynamic balance;standing static balance;standing dynamic balance  -AN     Static Sitting Balance standby assist  -AN     Dynamic Sitting Balance standby assist  -AN     Position, Sitting Balance sitting edge of bed  -AN     Sit to Stand Dynamic Balance verbal cues;contact guard  -AN     Static Standing Balance contact guard  -AN     Dynamic Standing Balance minimal assist;1-person assist  -AN     Position/Device Used, Standing Balance walker, rolling  -AN     Balance Interventions standing;sit to stand;supported;static;dynamic;minimal challenge;occupation based/functional task  -AN       Row Name 06/18/25 1149          Hip (Therapeutic Exercise)    Hip (Therapeutic Exercise) AAROM (active assistive range of motion)  -AN     Hip AAROM (Therapeutic Exercise) left;flexion;extension;supine;10 repetitions  -AN       Row Name 06/18/25 1149          Knee (Therapeutic Exercise)    Knee (Therapeutic Exercise) AAROM (active  assistive range of motion)  -AN     Knee AAROM (Therapeutic Exercise) left;flexion;extension;supine;10 repetitions  -AN       Row Name 06/18/25 1149          Ankle (Therapeutic Exercise)    Ankle (Therapeutic Exercise) AAROM (active assistive range of motion)  -AN     Ankle AAROM (Therapeutic Exercise) left;dorsiflexion;plantarflexion;supine;10 repetitions  -AN       Row Name 06/18/25 1149          OTHER    Stretching (Therapeutic Exercise) other (see comments)  calf stretches performed in supine and in standing with use of RW to reduce tightness and pain  -AN               User Key  (r) = Recorded By, (t) = Taken By, (c) = Cosigned By      Initials Name Provider Type    AN Lydia Sanchez OT Occupational Therapist                   Goals/Plan    No documentation.                  Clinical Impression       Row Name 06/18/25 1151          Pain Assessment    Pretreatment Pain Rating 7/10  -AN     Posttreatment Pain Rating 7/10  -AN     Pain Location extremity  -AN     Pain Side/Orientation left  -AN     Pain Management Interventions activity modification encouraged;cold applied  -AN     Response to Pain Interventions activity participation with tolerable pain  -AN       Row Name 06/18/25 1151          Plan of Care Review    Plan of Care Reviewed With patient  -AN     Progress declining  -AN     Outcome Evaluation Pt required increased assistance with functional transfers and limited mobility due to LLE pain and tightness. Encouraged pt to continue to perform LLE ther-ex daily and mobilize as able with RN staff. Cont POC.  -AN       Row Name 06/18/25 1151          Therapy Plan Review/Discharge Plan (OT)    Anticipated Discharge Disposition (OT) inpatient rehabilitation facility  -AN       Row Name 06/18/25 1151          Positioning and Restraints    Pre-Treatment Position in bed  -AN     Post Treatment Position bed  -AN     In Bed notified nsg;supine;call light within reach;encouraged to call for assist;patient  within staff view;exit alarm on;side rails up x2  -AN               User Key  (r) = Recorded By, (t) = Taken By, (c) = Cosigned By      Initials Name Provider Type    Lydia Smith OT Occupational Therapist                   Outcome Measures       Row Name 06/18/25 1153          How much help from another is currently needed...    Putting on and taking off regular lower body clothing? 2  -AN     Bathing (including washing, rinsing, and drying) 2  -AN     Toileting (which includes using toilet bed pan or urinal) 2  -AN     Putting on and taking off regular upper body clothing 3  -AN     Taking care of personal grooming (such as brushing teeth) 3  -AN     Eating meals 4  -AN     AM-PAC 6 Clicks Score (OT) 16  -AN       Row Name 06/18/25 0732          How much help from another person do you currently need...    Turning from your back to your side while in flat bed without using bedrails? 4  -CM     Moving from lying on back to sitting on the side of a flat bed without bedrails? 4  -CM     Moving to and from a bed to a chair (including a wheelchair)? 4  -CM     Standing up from a chair using your arms (e.g., wheelchair, bedside chair)? 4  -CM     Climbing 3-5 steps with a railing? 4  -CM     To walk in hospital room? 4  -CM     AM-PAC 6 Clicks Score (PT) 24  -CM       Row Name 06/18/25 1153          Functional Assessment    Outcome Measure Options AM-PAC 6 Clicks Daily Activity (OT)  -AN               User Key  (r) = Recorded By, (t) = Taken By, (c) = Cosigned By      Initials Name Provider Type    Lydia Smith OT Occupational Therapist    Velvet Carroll, RN Registered Nurse                    Occupational Therapy Education       Title: PT OT SLP Therapies (Done)       Topic: Occupational Therapy (Done)       Point: ADL training (Done)       Learning Progress Summary            Patient Acceptance, E, VU by LISA at 6/18/2025 1154                      Point: Home exercise program (Done)        Learning Progress Summary            Patient Acceptance, E, VU by AN at 6/18/2025 1154                      Point: Precautions (Done)       Learning Progress Summary            Patient Acceptance, E, VU by AN at 6/18/2025 1154                      Point: Body mechanics (Done)       Learning Progress Summary            Patient Acceptance, E, VU by AN at 6/18/2025 1154                                      User Key       Initials Effective Dates Name Provider Type Discipline    AN 09/21/21 -  Lydia Sanchez, OT Occupational Therapist OT                  OT Recommendation and Plan     Plan of Care Review  Plan of Care Reviewed With: patient  Progress: declining  Outcome Evaluation: Pt required increased assistance with functional transfers and limited mobility due to LLE pain and tightness. Encouraged pt to continue to perform LLE ther-ex daily and mobilize as able with RN staff. Cont POC.     Time Calculation:         Time Calculation- OT       Row Name 06/18/25 1154             Time Calculation- OT    OT Start Time 1155  -AN      OT Received On 06/18/25  -AN         Timed Charges    68514 - OT Therapeutic Exercise Minutes 15  -AN      67114 - OT Therapeutic Activity Minutes 8  -AN         Total Minutes    Timed Charges Total Minutes 23  -AN       Total Minutes 23  -AN                User Key  (r) = Recorded By, (t) = Taken By, (c) = Cosigned By      Initials Name Provider Type    AN Lydia Sanchez, OT Occupational Therapist                  Therapy Charges for Today       Code Description Service Date Service Provider Modifiers Qty    81664063717 HC OT THER PROC EA 15 MIN 6/18/2025 Lydia Sanchez, OT GO 1    92743385617 HC OT THERAPEUTIC ACT EA 15 MIN 6/18/2025 Lydia Sanchez, OT GO 1                 Lydia Laura, OT  6/18/2025

## 2025-06-18 NOTE — PLAN OF CARE
Problem: Adult Inpatient Plan of Care  Goal: Plan of Care Review  Outcome: Progressing  Goal: Patient-Specific Goal (Individualized)  Outcome: Progressing  Goal: Absence of Hospital-Acquired Illness or Injury  Outcome: Progressing  Intervention: Identify and Manage Fall Risk  Recent Flowsheet Documentation  Taken 6/18/2025 1249 by Velvet Mondragon RN  Safety Promotion/Fall Prevention:   activity supervised   assistive device/personal items within reach   clutter free environment maintained   safety round/check completed   room organization consistent   fall prevention program maintained   nonskid shoes/slippers when out of bed  Taken 6/18/2025 1022 by Velvet Mondragon RN  Safety Promotion/Fall Prevention:   activity supervised   assistive device/personal items within reach   fall prevention program maintained   gait belt   nonskid shoes/slippers when out of bed   room organization consistent   safety round/check completed  Taken 6/18/2025 0732 by Velvet Mondragon RN  Safety Promotion/Fall Prevention:   activity supervised   assistive device/personal items within reach   fall prevention program maintained   gait belt   nonskid shoes/slippers when out of bed   room organization consistent   safety round/check completed  Intervention: Prevent Skin Injury  Recent Flowsheet Documentation  Taken 6/18/2025 1249 by Velvet Mondragon RN  Body Position:   supine   position changed independently  Taken 6/18/2025 1022 by Velvet Mondragon RN  Body Position:   side-lying   left   position changed independently  Skin Protection: incontinence pads utilized  Taken 6/18/2025 0732 by Velvet Mondragon RN  Body Position:   side-lying   left  Skin Protection: incontinence pads utilized  Intervention: Prevent and Manage VTE (Venous Thromboembolism) Risk  Recent Flowsheet Documentation  Taken 6/18/2025 0732 by Velvet Mondragon RN  VTE Prevention/Management: (eliquis; see mar) other (see comments)  Goal: Optimal Comfort and  Wellbeing  Outcome: Progressing  Intervention: Monitor Pain and Promote Comfort  Recent Flowsheet Documentation  Taken 6/18/2025 1022 by Velvet Mondragon, RN  Pain Management Interventions: pain medication given  Goal: Readiness for Transition of Care  Outcome: Progressing     Problem: Comorbidity Management  Goal: Blood Glucose Level Within Target Range  Outcome: Progressing  Goal: Blood Pressure in Desired Range  Outcome: Progressing     Problem: Skin Injury Risk Increased  Goal: Skin Health and Integrity  Outcome: Progressing  Intervention: Optimize Skin Protection  Recent Flowsheet Documentation  Taken 6/18/2025 1249 by Velvet Mondragon, RN  Activity Management: activity encouraged  Taken 6/18/2025 1022 by Velvet Mondragon, RN  Activity Management: activity encouraged  Pressure Reduction Techniques: frequent weight shift encouraged  Pressure Reduction Devices: specialty bed utilized  Skin Protection: incontinence pads utilized  Taken 6/18/2025 0732 by Velvet Mondragon RN  Activity Management: activity encouraged  Pressure Reduction Techniques: frequent weight shift encouraged  Pressure Reduction Devices: specialty bed utilized  Skin Protection: incontinence pads utilized     Problem: Dysrhythmia  Goal: Normalized Cardiac Rhythm  Outcome: Progressing  Intervention: Monitor and Manage Cardiac Rhythm Effect  Recent Flowsheet Documentation  Taken 6/18/2025 0732 by Velvet Mondragon RN  VTE Prevention/Management: (eliquis; see mar) other (see comments)   Goal Outcome Evaluation: Left leg pain controlled today, redness improving. Ambulated in and around room today with walker and SBA, awaiting MRI to check for abscess or myositis in left leg, still giving IV ATB.

## 2025-06-18 NOTE — PLAN OF CARE
Problem: Adult Inpatient Plan of Care  Goal: Absence of Hospital-Acquired Illness or Injury  Intervention: Identify and Manage Fall Risk  Recent Flowsheet Documentation  Taken 6/18/2025 0200 by Carlos Traylor RN  Safety Promotion/Fall Prevention:   nonskid shoes/slippers when out of bed   safety round/check completed  Taken 6/18/2025 0000 by Carlos Traylor RN  Safety Promotion/Fall Prevention:   nonskid shoes/slippers when out of bed   safety round/check completed  Taken 6/17/2025 2200 by Carlos Traylor RN  Safety Promotion/Fall Prevention:   nonskid shoes/slippers when out of bed   safety round/check completed     Problem: Skin Injury Risk Increased  Goal: Skin Health and Integrity  Intervention: Optimize Skin Protection  Recent Flowsheet Documentation  Taken 6/18/2025 0200 by Carlos Traylor, RN  Head of Bed (HOB) Positioning: HOB elevated  Taken 6/18/2025 0000 by Carlos Traylor RN  Head of Bed (HOB) Positioning: HOB elevated  Taken 6/17/2025 2200 by Carlos Traylor RN  Head of Bed (HOB) Positioning: HOB elevated   Goal Outcome Evaluation:

## 2025-06-18 NOTE — DISCHARGE INSTR - APPOINTMENTS
Follow-up appointment: August 1, 2025 @ 9:15am  [facility] [address] [place]      [facility]  Friday, June 20 at 1030        Go to Registration first, then to 4th floor [facility]

## 2025-06-18 NOTE — PROGRESS NOTES
"  INFECTIOUS DISEASES  INPATIENT PROGRESS NOTE  2025      PATIENT NAME: Dale Alas  :  1955  MRN:  [id number]  Date of Admission:  2025      Antimicrobials:  ceFAZolin 2000 mg IVPB in 100 mL NS (MBP)  linezolid - 600 MG      MAR reviewed.     Reason for consultation: Left leg cellulitis    Interval history: Still having a lot of pain, redness, and swelling of calf.  Knee is fine.  No fevers.  Tolerating abx.    ROS:  No fevers/chills overnight.  No new rashes.  No SOB or chest pain.  No nausea/vomiting/diarrhea.  Denies side effects from antimicrobials.     Objective:  Temp (24hrs), Av.4 °F (36.9 °C), Min:98 °F (36.7 °C), Max:99.1 °F (37.3 °C)    /100   Pulse 72   Temp 98.5 °F (36.9 °C) (Oral)   Resp 18   Ht 189.2 cm (74.49\")   Wt (!) 149 kg (328 lb 7.8 oz)   SpO2 98%   BMI 41.62 kg/m²     Physical Examination:  GENERAL: Stable appearing male.  Awake and alert, in no acute distress. Obese.  HEENT: Normocephalic, atraumatic.  LUNGS: Normal respiratory effort.  ABDOMEN: Soft, nontender, nondistended.  No rebound or guarding.  Obese  EXT:  +left leg edema - ongoing improvement around knee, calf still with tight edema  MSK: Good movement of left knee and ankle without pain.  SKIN: No lesions of the left foot.  Ongoing redness and warmth of the left ankle and posterior calf; better more superiorly near knee.  Scabbed lesions of the left knee and the left anterior shin are healing and stable.  No drainage.  NEURO: A&Ox4. No focal deficits.  Face symmetric.  Speech fluent.  Moves all extremities well.   PSYCHIATRIC: Normal insight and judgement.  Cooperative.  Normal affect.    Laboratory Data:    Results from last 7 days   Lab Units 06/15/25  2036 06/13/25  1158 25  0744   WBC 10*3/mm3 9.10 9.40 10.24  9.87   HEMOGLOBIN g/dL 12.8* 11.4* 11.9*  11.9*   HEMATOCRIT % 38.8 35.5* 36.2*  35.7*   PLATELETS 10*3/mm3 201 192 190  182     Results from last 7 days   Lab Units " 06/14/25  1703   SODIUM mmol/L 136   POTASSIUM mmol/L 4.5   CHLORIDE mmol/L 102   CO2 mmol/L 24.0   BUN mg/dL 13.1   CREATININE mg/dL 1.10   GLUCOSE mg/dL 217*   CALCIUM mg/dL 8.9     Results from last 7 days   Lab Units 06/14/25  1703   ALK PHOS U/L 84   BILIRUBIN mg/dL 0.3   ALT (SGPT) U/L 29   AST (SGOT) U/L 32         Results from last 7 days   Lab Units 06/12/25  0744   CRP mg/dL 6.09*     Estimated Creatinine Clearance: 100 mL/min (by C-G formula based on SCr of 1.1 mg/dL).      Results from last 7 days   Lab Units 06/14/25  1703   CK TOTAL U/L 90               Microbiology:    Microbiology Results (last 10 days)       Procedure Component Value - Date/Time    Wound Culture - Wound, Leg, Left [[id number]] Collected: 06/13/25 1153    Lab Status: Final result Specimen: Wound from Leg, Left Updated: 06/15/25 0803     Wound Culture Moderate growth (3+) Normal Skin [place]     Gram Stain Few (2+) WBCs seen      Moderate (3+) Gram positive cocci in pairs    Blood Culture - Blood, Hand, Right [[id number]]  (Normal) Collected: 06/12/25 0750    Lab Status: Final result Specimen: Blood from Hand, Right Updated: 06/17/25 0815     Blood Culture No growth at 5 days    Narrative:      Less than seven (7) mL's of blood was collected.  Insufficient quantity may yield false negative results.    Blood Culture - Blood, Hand, Left [[id number]]  (Normal) Collected: 06/12/25 0744    Lab Status: Final result Specimen: Blood from Hand, Left Updated: 06/17/25 0815     Blood Culture No growth at 5 days    Narrative:      Less than seven (7) mL's of blood was collected.  Insufficient quantity may yield false negative results.    MRSA Screen, PCR (Inpatient) - Swab, Nares [[id number]]  (Normal) Collected: 06/12/25 0724    Lab Status: Final result Specimen: Swab from Nares Updated: 06/12/25 0858     MRSA PCR Negative    Narrative:      The negative predictive value of this diagnostic test is high and should only be used to consider de-escalating  anti-MRSA therapy. A positive result may indicate colonization with MRSA and must be correlated clinically.  MRSA Negative              Radiology:  No radiology results for the last day          DISCUSSION:  69 y.o. male with history of obesity, uncontrolled diabetes, and remote left knee arthroplasty admitted with left leg cellulitis.      PROBLEM LIST:   -- Left leg cellulitis.  Seems to have started after abrasion on the shin.  Small mild purulence able to be expressed from that wound.  Likely staphylococcal or streptococcal infection.  Wound culture with normal skin nathanael.  Risk to develop left knee periprosthetic joint infection but no effusion or pain.  No fluid collection on CT.  -- History of remote left knee arthroplasty, last in 2013 by Dr. Matias at [facility].  He reports he has had 3 total surgeries on his left knee, the first arthroplasty followed by 2 revisions due to mechanical issues and not infection.  Orthopedics evaluated and low suspicion for PJI as no pain or effusion; avoid aspiration with overlying cellulitis.  -- History of left lower leg orthopedic hardware, status post removal several years ago.  Details unknown.  -- History of left leg vein procedure.  Duplex this admission negative for DVT.  -- Atrial fibrillation, new onset.  Exacerbated by current infection.  Failed HELLEN, inability to pass probe.  -- Uncontrolled diabetes mellitus type 2.  Contributing to propensity for infection and poor wound healing.    PLAN:  -- Continue cefazolin 2 g iv q8h  -- Continue linezolid 600 mg po bid  -- Follow exam.  Slow improvement.  Still with a lot of erythema, pain, and swelling of calf - proceed with MRI to r/o myositis or abscess.  -- Low suspicion for PJI but continue to monitor knee.  -- Plan for oral linezolid at discharge, pending MRI results and ongoing improvement    Plan discussed with patient.    This visit included the following complex service elements:  Complex medical  decision-making associated with antimicrobial prescribing.  In-depth chart review with high level synthesis for complex diagnoses.      Carlos Dumont MD  6/18/2025  14:09 EDT

## 2025-06-19 ENCOUNTER — READMISSION MANAGEMENT (OUTPATIENT)
Dept: CALL CENTER | Facility: HOSPITAL | Age: 70
End: 2025-06-19
Payer: MEDICARE

## 2025-06-19 ENCOUNTER — APPOINTMENT (OUTPATIENT)
Dept: MRI IMAGING | Facility: HOSPITAL | Age: 70
End: 2025-06-19
Payer: MEDICARE

## 2025-06-19 VITALS
BODY MASS INDEX: 40.43 KG/M2 | TEMPERATURE: 98 F | SYSTOLIC BLOOD PRESSURE: 108 MMHG | RESPIRATION RATE: 18 BRPM | HEIGHT: 74 IN | HEART RATE: 94 BPM | OXYGEN SATURATION: 95 % | DIASTOLIC BLOOD PRESSURE: 97 MMHG | WEIGHT: 315 LBS

## 2025-06-19 LAB
GLUCOSE BLDC GLUCOMTR-MCNC: 107 MG/DL (ref 70–130)
GLUCOSE BLDC GLUCOMTR-MCNC: 190 MG/DL (ref 70–130)

## 2025-06-19 PROCEDURE — 63710000001 INSULIN LISPRO (HUMAN) PER 5 UNITS: Performed by: HOSPITALIST

## 2025-06-19 PROCEDURE — 63710000001 INSULIN GLARGINE PER 5 UNITS: Performed by: INTERNAL MEDICINE

## 2025-06-19 PROCEDURE — 25510000002 GADOBENATE DIMEGLUMINE 529 MG/ML SOLUTION: Performed by: INTERNAL MEDICINE

## 2025-06-19 PROCEDURE — 82948 REAGENT STRIP/BLOOD GLUCOSE: CPT

## 2025-06-19 PROCEDURE — 73720 MRI LWR EXTREMITY W/O&W/DYE: CPT

## 2025-06-19 PROCEDURE — 99239 HOSP IP/OBS DSCHRG MGMT >30: CPT | Performed by: INTERNAL MEDICINE

## 2025-06-19 PROCEDURE — 25010000002 CEFAZOLIN PER 500 MG: Performed by: INTERNAL MEDICINE

## 2025-06-19 PROCEDURE — A9577 INJ MULTIHANCE: HCPCS | Performed by: INTERNAL MEDICINE

## 2025-06-19 RX ORDER — HYDROCODONE BITARTRATE AND ACETAMINOPHEN 5; 325 MG/1; MG/1
1 TABLET ORAL EVERY 4 HOURS PRN
Qty: 12 TABLET | Refills: 0 | Status: SHIPPED | OUTPATIENT
Start: 2025-06-19

## 2025-06-19 RX ORDER — METOPROLOL TARTRATE 100 MG/1
100 TABLET ORAL 2 TIMES DAILY
Qty: 60 TABLET | Refills: 5 | Status: SHIPPED | OUTPATIENT
Start: 2025-06-19

## 2025-06-19 RX ADMIN — LINEZOLID 600 MG: 600 TABLET, FILM COATED ORAL at 09:42

## 2025-06-19 RX ADMIN — FINASTERIDE 5 MG: 5 TABLET, FILM COATED ORAL at 09:43

## 2025-06-19 RX ADMIN — INSULIN GLARGINE 25 UNITS: 100 INJECTION, SOLUTION SUBCUTANEOUS at 09:57

## 2025-06-19 RX ADMIN — GADOBENATE DIMEGLUMINE 20 ML: 529 INJECTION, SOLUTION INTRAVENOUS at 01:59

## 2025-06-19 RX ADMIN — FOLIC ACID 1 MG: 1 TABLET ORAL at 09:42

## 2025-06-19 RX ADMIN — ASPIRIN 81 MG: 81 TABLET, COATED ORAL at 09:42

## 2025-06-19 RX ADMIN — HYDROCODONE BITARTRATE AND ACETAMINOPHEN 1 TABLET: 5; 325 TABLET ORAL at 02:41

## 2025-06-19 RX ADMIN — APIXABAN 5 MG: 5 TABLET, FILM COATED ORAL at 09:42

## 2025-06-19 RX ADMIN — PRAVASTATIN SODIUM 80 MG: 40 TABLET ORAL at 09:43

## 2025-06-19 RX ADMIN — METOPROLOL TARTRATE 100 MG: 100 TABLET, FILM COATED ORAL at 09:42

## 2025-06-19 RX ADMIN — INSULIN LISPRO 2 UNITS: 100 INJECTION, SOLUTION INTRAVENOUS; SUBCUTANEOUS at 12:27

## 2025-06-19 RX ADMIN — Medication 10 ML: at 09:43

## 2025-06-19 RX ADMIN — EMPAGLIFLOZIN 10 MG: 10 TABLET, FILM COATED ORAL at 09:43

## 2025-06-19 RX ADMIN — SODIUM CHLORIDE 2000 MG: 900 INJECTION INTRAVENOUS at 04:59

## 2025-06-19 RX ADMIN — SODIUM CHLORIDE 2000 MG: 900 INJECTION INTRAVENOUS at 12:26

## 2025-06-19 RX ADMIN — TAMSULOSIN HYDROCHLORIDE 0.4 MG: 0.4 CAPSULE ORAL at 09:42

## 2025-06-19 RX ADMIN — HYDROCODONE BITARTRATE AND ACETAMINOPHEN 1 TABLET: 5; 325 TABLET ORAL at 09:42

## 2025-06-19 NOTE — CASE MANAGEMENT/SOCIAL WORK
Continued Stay Note   [place]     Patient Name: Dale Alas  MRN: [id number]  Today's Date: 6/19/2025    Admit Date: 6/12/2025    Plan: home   Discharge Plan       Row Name 06/19/25 1321       Plan    Plan home    Patient/Family in Agreement with Plan yes    Plan Comments I met with this patient regarding his discharge home today. He is in agreement. CM gave him an OPPT order. He will set up OPPT appointment when he arrives home. Dr Dumont placed the order for Dalbavancin IV x1 at [facility]. His appointment at the [facility] is for Friday, June 20 at 1030. The information was given to the patient. An Eliquis 30 day free trial coupon was given to the patient. His son can transport. He denies having any further discjharge planning needs. The IMM form was completed and noted in EPIC.    Final Discharge Disposition Code 01 - home or self-care                   Discharge Codes    No documentation.                 Expected Discharge Date and Time       Expected Discharge Date Expected Discharge Time    Jun 19, 2025               Jeanette Langley RN

## 2025-06-19 NOTE — PROGRESS NOTES
Nutrition Services    Patient Name:  Dale Alas  YOB: 1955  MRN: [id number]  Admit Date:  6/12/2025    Pt identified 2/2 LOS. Per RN, pt eating well without difficulty. No nutrition risk noted at this time. Please consult for intake avg <50% or significant weight change.        Electronically signed by:  Ana Palafox MS,RD,LD  06/19/25 08:53 EDT

## 2025-06-19 NOTE — PROGRESS NOTES
"  INFECTIOUS DISEASES  INPATIENT PROGRESS NOTE  2025      PATIENT NAME: Dale Alas  :  1955  MRN:  [id number]  Date of Admission:  2025      Antimicrobials:  ceFAZolin 2000 mg IVPB in 100 mL NS (MBP)  linezolid - 600 MG      MAR reviewed.     Reason for consultation: Left leg cellulitis    Interval history: Low grade fever transiently.  Edema continues to slowly improve.  More trouble walking due to pain from swelling in ankle.  MRI  yesterday did not show any bone/joint infection or abscess or myositis.    ROS:  No new rashes.  No SOB or chest pain.  No nausea/vomiting/diarrhea.  Denies side effects from antimicrobials.     Objective:  Temp (24hrs), Av.9 °F (37.2 °C), Min:98 °F (36.7 °C), Max:100.6 °F (38.1 °C)    /71 (BP Location: Left arm, Patient Position: Sitting)   Pulse 64   Temp 98 °F (36.7 °C) (Oral)   Resp 18   Ht 189.2 cm (74.49\")   Wt (!) 149 kg (328 lb 7.8 oz)   SpO2 95%   BMI 41.62 kg/m²     Physical Examination:  GENERAL: Stable appearing male.  Awake and alert, in no acute distress. Obese.  HEENT: Normocephalic, atraumatic.  LUNGS: Normal respiratory effort.  ABDOMEN: Soft, nontender, nondistended.  No rebound or guarding.  Obese  EXT:  +left leg edema - ongoing slow improvement around knee, calf less tight  MSK: Good movement of left knee and ankle without pain.  SKIN: No lesions of the left foot.  Slow improvement of redness and warmth of the left ankle and posterior calf; better more superiorly near knee.  Scabbed lesions of the left knee and the left anterior shin are healing and stable.  No drainage.  NEURO: A&Ox4. No focal deficits.  Face symmetric.  Speech fluent.  Moves all extremities well.   PSYCHIATRIC: Normal insight and judgement.  Cooperative.  Normal affect.    Laboratory Data:    Results from last 7 days   Lab Units 06/15/25  2036 06/13/25  1158   WBC 10*3/mm3 9.10 9.40   HEMOGLOBIN g/dL 12.8* 11.4*   HEMATOCRIT % 38.8 35.5*   PLATELETS 10*3/mm3 " 201 192     Results from last 7 days   Lab Units 06/14/25  1703   SODIUM mmol/L 136   POTASSIUM mmol/L 4.5   CHLORIDE mmol/L 102   CO2 mmol/L 24.0   BUN mg/dL 13.1   CREATININE mg/dL 1.10   GLUCOSE mg/dL 217*   CALCIUM mg/dL 8.9     Results from last 7 days   Lab Units 06/14/25  1703   ALK PHOS U/L 84   BILIRUBIN mg/dL 0.3   ALT (SGPT) U/L 29   AST (SGOT) U/L 32               Estimated Creatinine Clearance: 100 mL/min (by C-G formula based on SCr of 1.1 mg/dL).      Results from last 7 days   Lab Units 06/14/25  1703   CK TOTAL U/L 90               Microbiology:    Microbiology Results (last 10 days)       Procedure Component Value - Date/Time    Wound Culture - Wound, Leg, Left [[id number]] Collected: 06/13/25 1153    Lab Status: Final result Specimen: Wound from Leg, Left Updated: 06/15/25 0803     Wound Culture Moderate growth (3+) Normal Skin Cortney     Gram Stain Few (2+) WBCs seen      Moderate (3+) Gram positive cocci in pairs    Blood Culture - Blood, Hand, Right [[id number]]  (Normal) Collected: 06/12/25 0750    Lab Status: Final result Specimen: Blood from Hand, Right Updated: 06/17/25 0815     Blood Culture No growth at 5 days    Narrative:      Less than seven (7) mL's of blood was collected.  Insufficient quantity may yield false negative results.    Blood Culture - Blood, Hand, Left [[id number]]  (Normal) Collected: 06/12/25 0744    Lab Status: Final result Specimen: Blood from Hand, Left Updated: 06/17/25 0815     Blood Culture No growth at 5 days    Narrative:      Less than seven (7) mL's of blood was collected.  Insufficient quantity may yield false negative results.    MRSA Screen, PCR (Inpatient) - Swab, Nares [[id number]]  (Normal) Collected: 06/12/25 0724    Lab Status: Final result Specimen: Swab from Nares Updated: 06/12/25 0858     MRSA PCR Negative    Narrative:      The negative predictive value of this diagnostic test is high and should only be used to consider de-escalating anti-MRSA therapy.  A positive result may indicate colonization with MRSA and must be correlated clinically.  MRSA Negative              Radiology:  MRI Tibia Fibula Left With & Without Contrast  Result Date: 6/19/2025  Impression: 1.There is soft tissue edema of the lower leg most pronounced inferiorly. This is nonspecific. It could be seen with cellulitis. 2.There is mild increased signal intensity in the periphery of the medial gastrocnemius muscle distally. This could be seen with muscle strain or less likely myositis. 3.There is moderate atrophy of the gastrocnemius and soleus muscles. 4.There is a longstem total knee arthroplasty. 5.There is no abnormal marrow signal intensity to suggest osteomyelitis. Electronically Signed: Obdulia Barahona MD  6/19/2025 8:15 AM EDT  Workstation ID: [id number]            DISCUSSION:  69 y.o. male with history of obesity, uncontrolled diabetes, and remote left knee arthroplasty admitted with left leg cellulitis.      PROBLEM LIST:   -- Left leg cellulitis.  Seems to have started after abrasion on the shin.  Small mild purulence able to be expressed from that wound.  Likely staphylococcal or streptococcal infection.  Wound culture with normal skin nathanael.  Risk to develop left knee periprosthetic joint infection but no effusion or pain.  No fluid collection on CT.  MRI without bone/joint infection, abscess, or myositis.  Slow improvement on linezolid and cefazolin.  -- History of remote left knee arthroplasty, last in 2013 by Dr. Matias at [facility].  He reports he has had 3 total surgeries on his left knee, the first arthroplasty followed by 2 revisions due to mechanical issues and not infection.  Orthopedics evaluated and low suspicion for PJI as no pain or effusion; avoid aspiration with overlying cellulitis.  -- History of left lower leg orthopedic hardware, status post removal several years ago.  Details unknown.  -- History of left leg vein procedure.  Duplex this admission  negative for DVT.  -- Atrial fibrillation, new onset.  Exacerbated by current infection.  Failed HELLEN, inability to pass probe.  -- Uncontrolled diabetes mellitus type 2.  Contributing to propensity for infection and poor wound healing.    PLAN:  -- Continue cefazolin 2 g iv q8h  -- Continue linezolid 600 mg po bid  -- Patient prefers outpatient IV abx as opposed to oral linezolid at discharge.  He prefers not to have PICC.  Lives too far away to come to my office daily for outpatient infusion.  Pt would be agreeable to dalbavancin infusion on day after discharge at [facility].  Order to CM to see if insurance will cover - dalbavancin 1500 mg iv x 1 outpatient infusion - and arrange at [facility] on day after discharge.  -- I can see him back in office one week after discharge.    Plan discussed with patient, Dr. Greenfield, and CM.  Could be discharged today if dalbavancin infusion able to be arranged in [place] for tomorrow.    This visit included the following complex service elements:  Complex medical decision-making associated with antimicrobial prescribing.  In-depth chart review with high level synthesis for complex diagnoses.      Carlos Dumont MD  6/19/2025  11:04 EDT

## 2025-06-19 NOTE — PLAN OF CARE
Problem: Adult Inpatient Plan of Care  Goal: Plan of Care Review  Outcome: Progressing  Flowsheets (Taken 6/19/2025 0606)  Progress: no change  Outcome Evaluation: VSS. RA, CPAP overnight. NSR. Pain in left foot when up and moving. Mobility decreased from foot swelling and pain. Abx administered. PRN pain medication given once. Patient taken to MRI. Patient now resting comfortably. Safety and fall precautions in place. Will continue plan of care.  Plan of Care Reviewed With: patient  Goal: Patient-Specific Goal (Individualized)  Outcome: Progressing  Goal: Absence of Hospital-Acquired Illness or Injury  Outcome: Progressing  Intervention: Identify and Manage Fall Risk  Recent Flowsheet Documentation  Taken 6/19/2025 0400 by Gurdeep Carrasquillo RN  Safety Promotion/Fall Prevention:   activity supervised   assistive device/personal items within reach   clutter free environment maintained   fall prevention program maintained   nonskid shoes/slippers when out of bed   safety round/check completed  Taken 6/19/2025 0241 by Gurdeep Carrasquillo RN  Safety Promotion/Fall Prevention:   activity supervised   assistive device/personal items within reach   clutter free environment maintained   fall prevention program maintained   nonskid shoes/slippers when out of bed   safety round/check completed  Taken 6/19/2025 0000 by Gurdeep Carrasquillo RN  Safety Promotion/Fall Prevention:   activity supervised   assistive device/personal items within reach   clutter free environment maintained   fall prevention program maintained   nonskid shoes/slippers when out of bed   safety round/check completed  Taken 6/18/2025 2200 by Gurdeep Carrasquillo RN  Safety Promotion/Fall Prevention:   activity supervised   assistive device/personal items within reach   clutter free environment maintained   fall prevention program maintained   nonskid shoes/slippers when out of bed   safety round/check completed  Taken 6/18/2025 2000 by Gurdeep Carrasquillo  DEBBI GREGORY  Safety Promotion/Fall Prevention:   activity supervised   assistive device/personal items within reach   clutter free environment maintained   fall prevention program maintained   nonskid shoes/slippers when out of bed   safety round/check completed  Intervention: Prevent Skin Injury  Recent Flowsheet Documentation  Taken 6/19/2025 0400 by Guredep Carrasquillo RN  Body Position: position changed independently  Skin Protection:   transparent dressing maintained   incontinence pads utilized  Taken 6/19/2025 0241 by Gurdeep Carrasquillo RN  Body Position: position changed independently  Skin Protection:   transparent dressing maintained   incontinence pads utilized  Taken 6/19/2025 0100 by Gurdeep Carrasquillo RN  Skin Protection:   transparent dressing maintained   incontinence pads utilized  Taken 6/19/2025 0000 by Gurdeep Carrasquillo RN  Body Position: position changed independently  Skin Protection:   transparent dressing maintained   incontinence pads utilized  Taken 6/18/2025 2200 by Gurdeep Carrasquillo RN  Body Position: position changed independently  Skin Protection:   transparent dressing maintained   incontinence pads utilized  Taken 6/18/2025 2000 by Gurdeep Carrasquillo RN  Body Position: position changed independently  Skin Protection:   transparent dressing maintained   incontinence pads utilized  Intervention: Prevent and Manage VTE (Venous Thromboembolism) Risk  Recent Flowsheet Documentation  Taken 6/18/2025 2000 by Gurdeep Carrasquillo RN  VTE Prevention/Management: (Eliquis) other (see comments)  Intervention: Prevent Infection  Recent Flowsheet Documentation  Taken 6/19/2025 0400 by Gurdeep Carrasquillo RN  Infection Prevention:   environmental surveillance performed   hand hygiene promoted   rest/sleep promoted  Taken 6/19/2025 0241 by Gurdeep Carrasquillo RN  Infection Prevention:   environmental surveillance performed   rest/sleep promoted   hand hygiene promoted  Taken 6/19/2025 0000 by  Gurdeep Carrasquillo RN  Infection Prevention:   environmental surveillance performed   hand hygiene promoted   rest/sleep promoted  Taken 6/18/2025 2200 by Gurdeep Carrasquillo RN  Infection Prevention:   environmental surveillance performed   hand hygiene promoted   rest/sleep promoted  Taken 6/18/2025 2000 by Gurdeep Carrasquillo RN  Infection Prevention:   environmental surveillance performed   rest/sleep promoted   hand hygiene promoted  Goal: Optimal Comfort and Wellbeing  Outcome: Progressing  Intervention: Monitor Pain and Promote Comfort  Recent Flowsheet Documentation  Taken 6/19/2025 0241 by Gurdeep Carrasquillo RN  Pain Management Interventions: pain medication given  Intervention: Provide Person-Centered Care  Recent Flowsheet Documentation  Taken 6/18/2025 2000 by Gurdeep Carrasquillo RN  Trust Relationship/Rapport:   care explained   choices provided   thoughts/feelings acknowledged  Goal: Readiness for Transition of Care  Outcome: Progressing     Problem: Comorbidity Management  Goal: Blood Glucose Level Within Target Range  Outcome: Progressing  Intervention: Monitor and Manage Glycemia  Recent Flowsheet Documentation  Taken 6/19/2025 0400 by Gurdeep Carrasquillo RN  Medication Review/Management: medications reviewed  Taken 6/19/2025 0241 by Gurdeep Carrasquillo RN  Medication Review/Management: medications reviewed  Taken 6/19/2025 0100 by Gurdeep Carrasquillo RN  Medication Review/Management: medications reviewed  Taken 6/19/2025 0000 by Gurdeep Carrasquillo RN  Medication Review/Management: medications reviewed  Taken 6/18/2025 2200 by Gurdeep Carrasquillo RN  Medication Review/Management: medications reviewed  Taken 6/18/2025 2000 by Gurdeep Carrasquillo RN  Medication Review/Management: medications reviewed  Goal: Blood Pressure in Desired Range  Outcome: Progressing  Intervention: Maintain Blood Pressure Management  Recent Flowsheet Documentation  Taken 6/19/2025 0400 by Gurdeep Carrasquillo  RN  Medication Review/Management: medications reviewed  Taken 6/19/2025 0241 by Gurdeep Carrasquillo RN  Medication Review/Management: medications reviewed  Taken 6/19/2025 0100 by Gurdeep Carrasquillo RN  Medication Review/Management: medications reviewed  Taken 6/19/2025 0000 by Gurdeep Carrasquillo RN  Medication Review/Management: medications reviewed  Taken 6/18/2025 2200 by Gurdeep Carrasquillo RN  Medication Review/Management: medications reviewed  Taken 6/18/2025 2000 by Gurdeep Carrasquillo RN  Medication Review/Management: medications reviewed     Problem: Skin Injury Risk Increased  Goal: Skin Health and Integrity  Outcome: Progressing  Intervention: Optimize Skin Protection  Recent Flowsheet Documentation  Taken 6/19/2025 0400 by Gurdeep Carrasquillo RN  Activity Management: activity encouraged  Pressure Reduction Techniques:   frequent weight shift encouraged   weight shift assistance provided  Head of Bed (HOB) Positioning: HOB lowered  Pressure Reduction Devices:   pressure-redistributing mattress utilized   positioning supports utilized  Skin Protection:   transparent dressing maintained   incontinence pads utilized  Taken 6/19/2025 0241 by Gurdeep Carrasquillo RN  Activity Management: activity encouraged  Pressure Reduction Techniques:   frequent weight shift encouraged   weight shift assistance provided  Head of Bed (HOB) Positioning: HOB lowered  Pressure Reduction Devices:   pressure-redistributing mattress utilized   positioning supports utilized  Skin Protection:   transparent dressing maintained   incontinence pads utilized  Taken 6/19/2025 0100 by Gurdeep Carrasquillo RN  Pressure Reduction Techniques:   frequent weight shift encouraged   weight shift assistance provided  Pressure Reduction Devices:   pressure-redistributing mattress utilized   positioning supports utilized  Skin Protection:   transparent dressing maintained   incontinence pads utilized  Taken 6/19/2025 0000 by Gurdeep Carrasquillo  RN  Activity Management: activity encouraged  Pressure Reduction Techniques:   frequent weight shift encouraged   weight shift assistance provided  Head of Bed (HOB) Positioning: HOB lowered  Pressure Reduction Devices:   pressure-redistributing mattress utilized   positioning supports utilized  Skin Protection:   transparent dressing maintained   incontinence pads utilized  Taken 6/18/2025 2200 by Gurdeep Carrasquillo RN  Activity Management: activity encouraged  Pressure Reduction Techniques:   frequent weight shift encouraged   weight shift assistance provided  Head of Bed (HOB) Positioning: HOB lowered  Pressure Reduction Devices:   pressure-redistributing mattress utilized   positioning supports utilized  Skin Protection:   transparent dressing maintained   incontinence pads utilized  Taken 6/18/2025 2000 by Gurdeep Carrasquillo RN  Activity Management: activity encouraged  Pressure Reduction Techniques:   frequent weight shift encouraged   weight shift assistance provided  Head of Bed (HOB) Positioning: HOB lowered  Pressure Reduction Devices:   pressure-redistributing mattress utilized   positioning supports utilized  Skin Protection:   transparent dressing maintained   incontinence pads utilized     Problem: Dysrhythmia  Goal: Normalized Cardiac Rhythm  Outcome: Progressing  Intervention: Monitor and Manage Cardiac Rhythm Effect  Recent Flowsheet Documentation  Taken 6/18/2025 2000 by Gurdeep Carrasquillo RN  VTE Prevention/Management: (Eliquis) other (see comments)   Goal Outcome Evaluation:  Plan of Care Reviewed With: patient        Progress: no change  Outcome Evaluation: VSS. RA, CPAP overnight. NSR. Pain in left foot when up and moving. Mobility decreased from foot swelling and pain. Abx administered. PRN pain medication given once. Patient taken to MRI. Patient now resting comfortably. Safety and fall precautions in place. Will continue plan of care.

## 2025-06-19 NOTE — PROGRESS NOTES
[facility] Medicine Services  PROGRESS NOTE    Patient Name: Dale Alas  : 1955  MRN: [id number]    Date of Admission: 2025  Primary Care Physician: Guillermo Enciso MD    Subjective   Subjective     CC:  LLE pain     HPI:  He notes the LLE has generally improved, but also states it was more painful to walk on w PT yesterday than it was on admission.        Objective   Objective     Vital Signs:   Temp:  [98.1 °F (36.7 °C)-100.6 °F (38.1 °C)] 98.1 °F (36.7 °C)  Heart Rate:  [60-97] 60  Resp:  [18] 18  BP: (135-184)/() 154/76     Physical Exam:  Constitutional: No acute distress, awake, alert  HENT: NCAT, mucous membranes moist  Respiratory: Clear to auscultation bilaterally, respiratory effort normal   Cardiovascular: irregular-afib on tele, no murmurs, rubs, or gallops  Gastrointestinal: Positive bowel sounds, soft, nontender, nondistended, obese  Musculoskeletal: signif erythema and edema persists LLE, tender.  No apparent change from yest   Psychiatric: Appropriate affect, cooperative  Neurologic: Oriented x 3, ALEXIS, speech clear      Results Reviewed:  LAB RESULTS:      Lab 06/15/25  2036 06/13/25  1158   WBC 9.10 9.40   HEMOGLOBIN 12.8* 11.4*   HEMATOCRIT 38.8 35.5*   PLATELETS 201 192   NEUTROS ABS  --  7.07*   IMMATURE GRANS (ABS)  --  0.05   LYMPHS ABS  --  1.59   MONOS ABS  --  0.57   EOS ABS  --  0.09   MCV 84.7 85.5         Lab 25  1703 25  1158   SODIUM 136 134*   POTASSIUM 4.5 4.3   CHLORIDE 102 102   CO2 24.0 24.0   ANION GAP 10.0 8.0   BUN 13.1 16.6   CREATININE 1.10 1.07   EGFR 73.1 75.6   GLUCOSE 217* 346*   CALCIUM 8.9 8.6         Lab 25  1703   TOTAL PROTEIN 7.1   ALBUMIN 3.2*   GLOBULIN 3.9   ALT (SGPT) 29   AST (SGOT) 32   BILIRUBIN 0.3   ALK PHOS 84                       Brief Urine Lab Results  (Last result in the past 365 days)        Color   Clarity   Blood   Leuk Est   Nitrite   Protein   CREAT   Urine HCG         04/14/25 1301 Yellow   Clear   Trace   Negative   Negative   Negative                   Microbiology Results Abnormal       None            MRI Tibia Fibula Left With & Without Contrast  Result Date: 6/19/2025  MRI TIBIA FIBULA LEFT W WO CONTRAST Date of Exam: 6/19/2025 12:50 AM EDT Indication: pain, swelling, celluitis, assess for abscess or myositis. Previous CT described irregularity of the posterior lateral proximal tibia. Soft tissue swelling.  Comparison: Left knee radiograph 6/13/2025, CT of the left lower extremity 6/13/2025 Technique:  Routine multiplanar/multisequence images of the left tibia and fibula were obtained before and after the uneventful intravenous administration of IV Multihance.  Findings: There is a longstem total knee arthroplasty. There is susceptibly artifact related to the arthroplasty affecting evaluation of the knee. There is irregularity of the posterior medial tibial proximal diaphysis related to remote healed fracture. This is seen on radiograph as well. This is near the distal femoral stem. There are no acute fractures. There is no abnormal marrow signal intensity in field-of-view that would suggest osteomyelitis. There is soft tissue edema in the lower leg most pronounced inferiorly. There is mild increased signal intensity in the periphery of the medial gastrocnemius muscle distally which could be seen with muscle strain or less likely myositis. There is moderate atrophy of the gastrocnemius and soleus muscles. The Achilles tendon is intact. The other muscles and tendons in the field-of-view are normal. No significant tibiotalar joint effusion.     Impression: Impression: 1.There is soft tissue edema of the lower leg most pronounced inferiorly. This is nonspecific. It could be seen with cellulitis. 2.There is mild increased signal intensity in the periphery of the medial gastrocnemius muscle distally. This could be seen with muscle strain or less likely myositis. 3.There is  moderate atrophy of the gastrocnemius and soleus muscles. 4.There is a longstem total knee arthroplasty. 5.There is no abnormal marrow signal intensity to suggest osteomyelitis. Electronically Signed: Obdulia Barahona MD  6/19/2025 8:15 AM EDT  Workstation ID: [id number]      Results for orders placed during the hospital encounter of 06/12/25    Adult Transthoracic Echo Complete W/ Cont if Necessary Per Protocol    Interpretation Summary    Left ventricular systolic function is normal. Calculated left ventricular EF = 69.8% Normal left ventricular cavity size noted. Left ventricular wall thickness is consistent with mild concentric hypertrophy. All left ventricular wall segments contract normally. Normal left atrial pressure.    The left atrial cavity is borderline dilated. Left atrial volume is borderline increased.    The aortic valve is grossly normal in structure. No significant aortic valve regurgitation is present. No aortic valve stenosis is present.    The mitral valve is grossly normal in structure. Trace mitral valve regurgitation is present. No significant mitral valve stenosis is present.    Tricuspid valve not well visualized. Trace tricuspid valve regurgitation is present.      Current medications:  Scheduled Meds:apixaban, 5 mg, Oral, Q12H  aspirin, 81 mg, Oral, Daily  carbidopa-levodopa, 1 tablet, Oral, Nightly  ceFAZolin, 2,000 mg, Intravenous, Q8H  empagliflozin, 10 mg, Oral, Daily  finasteride, 5 mg, Oral, Daily  folic acid, 1 mg, Oral, Daily  insulin glargine, 25 Units, Subcutaneous, Daily  insulin lispro, 2-9 Units, Subcutaneous, 4x Daily AC & at Bedtime  linezolid, 600 mg, Oral, Q12H  metoprolol tartrate, 100 mg, Oral, BID  pravastatin, 80 mg, Oral, Daily  sodium chloride, 10 mL, Intravenous, Q12H  tamsulosin, 0.4 mg, Oral, Daily      Continuous Infusions:dilTIAZem, 5-15 mg/hr, Last Rate: Stopped (06/17/25 1120)      PRN Meds:.  senna-docusate sodium **AND** polyethylene glycol **AND** bisacodyl  **AND** bisacodyl    Calcium Replacement - Follow Nurse / BPA Driven Protocol    dextrose    dextrose    glucagon (human recombinant)    HYDROcodone-acetaminophen    Magnesium Standard Dose Replacement - Follow Nurse / BPA Driven Protocol    nitroglycerin    ondansetron ODT **OR** ondansetron    oxyCODONE    Phosphorus Replacement - Follow Nurse / BPA Driven Protocol    Potassium Replacement - Follow Nurse / BPA Driven Protocol    sodium chloride    sodium chloride    Assessment & Plan   Assessment & Plan     Active Hospital Problems    Diagnosis  POA    **Atrial fibrillation [I48.91]  Yes    Primary hypertension [I10]  Unknown    Prediabetes [R73.03]  Unknown    Other hyperlipidemia [E78.49]  Unknown    RLS (restless legs syndrome) [G25.81]  Unknown    Cellulitis of left lower extremity [L03.116]  Unknown      Resolved Hospital Problems   No resolved problems to display.        Brief Hospital Course to date:  Dale Alas is a 69 y.o. male  w history of HTN MAREN, here with chills and dyspnea for a week, newly diagnosed Afib, and new leg swelling and LLE cellulitis    Plan was partially entered by my partner and I have reviewed and updated as appropriate on 6/17/25     Dyspnea  New onset atrial fibrillation  -no PE on CTA  -on Eliquis  -rate control with BB per cardiology  -ECHO normal EF borderline LA enlargement  -FU with Dr Camacho in 6 weeks      LLE cellulitis  LLE pain  History of prosthetic knee   -MRSA PCR negative   -Rocephin initially, ID consulted, abs changed to Cefazolin and daptomycin (changed to oral linezolid)  -Dr Araujo ortho evaluated knee due to hx of arthroplasty does not think arthrocentesis necessary  -blood cultures NGTD  -BLE duplex negative on admission  - consider unna boot and outpt IV abx - will discuss w ID      DM A1C 8.9   -SSI  -on metformin alone at home.  Added glargine; likely DC on basal insulin    RLS -on sinemet     HTN  -stable, monitor    Expected Discharge Location and  Transportation: home   Expected Discharge   Expected Discharge Date: 6/18/2025; Expected Discharge Time:      VTE Prophylaxis:  Pharmacologic & mechanical VTE prophylaxis orders are present.         AM-PAC 6 Clicks Score (PT): 19 (06/19/25 9416)    CODE STATUS:   Code Status and Medical Interventions: CPR (Attempt to Resuscitate); Full Support   Ordered at: 06/12/25 0654     Code Status (Patient has no pulse and is not breathing):    CPR (Attempt to Resuscitate)     Medical Interventions (Patient has pulse or is breathing):    Full Support       Gerri Greenfield MD  06/19/25

## 2025-06-19 NOTE — DISCHARGE PLACEMENT REQUEST
"Dale Alas (69 y.o. Male)   [place]  [place]        Date of Birth   1955    Social Security Number       Address   [address] [place] [address]    Home Phone   [phone]    MRN   [id number]       Sikhism   None    Marital Status                               Admission Date   6/12/2025    Admission Type   Urgent    Admitting Provider   Gerri Greenfield MD    Attending Provider   Gerri Greenfield MD    Department, Room/Bed   [address], S210/1       Discharge Date       Discharge Disposition       Discharge Destination                                 Attending Provider: Gerri Greenfield MD    Allergies: Other    Isolation: None   Infection: None   Code Status: CPR    Ht: 189.2 cm (74.49\")   Wt: 149 kg (328 lb 7.8 oz)    Admission Cmt: None   Principal Problem: Atrial fibrillation [I48.91]                   Active Insurance as of 6/12/2025       Primary Coverage       Payor Plan Insurance Group Employer/Plan Group    MEDICARE MEDICARE A & B        Payor Plan Address Payor Plan Phone Number Payor Plan Fax Number Effective Dates    [address] [phone]  9/1/2010 - None Entered    [facility] [address]         Subscriber Name Subscriber Birth Date Member ID       DALE ALAS 1955 [id number]               Secondary Coverage       Payor Plan Insurance Group Employer/Plan Group    STANDARD LIFE ACCIDENT STANDARD LIFE AND ACCIDENT MC SUP        Payor Plan Address Payor Plan Phone Number Payor Plan Fax Number Effective Dates    [address] [address] [phone]  10/1/2021 - None Entered    [facility] [id number]         Subscriber Name Subscriber Birth Date Member ID       DALE ALAS 1955 [id number]                     Emergency Contacts        (Rel.) Home Phone Work Phone Mobile Phone    Jitendra Alas (Son) [phone] -- --    ulices sawyer (Friend) -- -- [phone]              Insurance Information                  " "MEDICARE/MEDICARE A & B Phone: [phone]    Subscriber: Dale Alas Subscriber#: [id number]    Group#: -- Precert#: --    Authorization#: -- Effective Date: --        STANDARD LIFE ACCIDENT/STANDARD LIFE AND ACCIDENT MC SUP Phone: [phone]    Subscriber: Dale Alas Subscriber#: [id number]    Group#: -- Precert#: --    Authorization#: -- Effective Date: --             History & Physical        Andres Zavaleta MD at 25 0659              [facility]  HISTORY AND PHYSICAL    Patient Name: Dale Alas  : 1955  MRN: [id number]  Primary Care Physician: Guillermo Enciso MD  Date of admission: 2025      Subjective  Subjective     Chief Complaint: Dyspnea/weakness    HPI:  Dale Alas is a 69 y.o. male with past medical history significant for prediabetes, HTN, HL, OA, MAREN, RLS, overactive bladder s/p botox, here with weakness. Noted chills/sweats 6- overnight with weakness/SOA following that. Felt tired/SOA/weak through -. Felt better then recurrent SOA  and weakness, noting no strength in his LE for 1-2 days prompting ED visit at [facility]. Found to have atrial fibrillation with RVR there. Notably, progressive BLE swelling/edema, markedly worse in LLE. Redness noted. Has noted \"scraping\" his leg last week.     Otherwise, denies fevers. Cough/congestion noted, but white sputum. No chest pain. No PND/orthopnea. Denies palpitations. Denies overt dizziness. No n/v/diarrhea. No blood in stool/urine. No dysphagia/dysarthria. No numbness, tingling, no focal weakness.       Personal History     Past Medical History:   Diagnosis Date    Arthritis     Diabetes     Hypercholesteremia     MI (myocardial infarction)     Sleep apnea     CPAP    Wears glasses            Past Surgical History:   Procedure Laterality Date    COLONOSCOPY      over 10 years ago    GALLBLADDER SURGERY N/A 2015    REPLACEMENT TOTAL KNEE Left 2013       Family " "History: family history includes Cancer (age of onset: 34) in his son; Kidney cancer in his maternal grandfather; Prostate cancer in his maternal grandfather.     Social History:  reports that he has quit smoking. His smoking use included cigarettes. He has a 1 pack-year smoking history. He has never used smokeless tobacco. He reports that he does not drink alcohol and does not use drugs.  Social History     Social History Narrative    Not on file       Medications:  Available home medication information reviewed.  Cyanocobalamin, Insulin Glargine (2 Unit Dial), Melatonin, Tart Cherry, Tirzepatide, aspirin, carbidopa-levodopa, finasteride, folic acid, lisinopril, meloxicam, metFORMIN ER, metoprolol tartrate, niacin, pravastatin, and tamsulosin    Allergies   Allergen Reactions    Other Hives     \"Some kind of shot\" cannot recall name       Objective  Objective     Vital Signs:   Temp:  [97.6 °F (36.4 °C)] 97.6 °F (36.4 °C)  BP: (145)/(84) 145/84       Physical Exam   NAD, alert and oriented  OP clear, dry MM  Neck supple  No LAD  Tachy  Decreased at bases, distant BS  +BS, soft  Morbidly obese  B LE edema, but LLE markedly edematous, erythematous, and warm, notably tibial below knee to ankle. TTP.   ALEXIS  Normal affect  Speech clear, face symmetric    Result Review:  I have personally reviewed the results from the time of this admission to 6/12/2025 07:08 EDT and agree with these findings:  []  Laboratory list / accordion  []  Microbiology  []  Radiology  []  EKG/Telemetry   []  Cardiology/Vascular   []  Pathology  []  Old records  []  Other:  Most notable findings include: EKG atrial fibrillation with RVR, CTA chest with no PE noted, no thoracic aneurysm, no infiltrates/PTX. Calcified R pleural plaque, chronic appearing R 5th rib fracture, hepatomegaly    WBC 7.5  Hgb 13.1  Platelets 190    Creatinine 1.09    LA 2.2      LAB RESULTS:      Lab 06/11/25  2342   PROTIME 9.4   INR 0.96         Lab 06/11/25  2342 "   MAGNESIUM 1.7*                         UA          2025    13:01 2025    01:57   Urinalysis   Squamous Epithelial Cells, UA  0-2       Ketones, UA Negative     Leukocytes, UA Negative     RBC, UA  None Seen       WBC, UA  None Seen          Details          This result is from an external source.               Microbiology Results (last 10 days)       ** No results found for the last 240 hours. **            No radiology results from the last 24 hrs        Assessment & Plan  Assessment & Plan       Atrial fibrillation    Primary hypertension    Prediabetes    Other hyperlipidemia    RLS (restless legs syndrome)    Cellulitis of left lower extremity    Dyspnea  New onset atrial fibrillation  -no PE on CTA  -on heparin  -ECHO pending  -repeat EKG  -cardiology consult    LLE cellulitis  -cannot rule out DVT, duplex pending  -rocephin/vancomycin  -notably with anterior tibial lacerations  -check MRSA PCR  -blood cultures    Prediabetes  -check A1C  -SSI    RLS  -continue home sinement    HTN/HL  -resume appropriate home meds    MAREN  -CPAP    VTE Prophylaxis:  Mechanical VTE prophylaxis orders are present.          CODE STATUS:    Code Status and Medical Interventions: CPR (Attempt to Resuscitate); Full Support   Ordered at: 25 0654     Code Status (Patient has no pulse and is not breathing):    CPR (Attempt to Resuscitate)     Medical Interventions (Patient has pulse or is breathing):    Full Support       Expected Discharge   Expected discharge date/ time has not been documented.     Andrse Zavaleta MD  25      Electronically signed by Andres Zavaleta MD at 25 0712          Physician Progress Notes (most recent note)        Carlos Dumont MD at 25 1409            INFECTIOUS DISEASES  INPATIENT PROGRESS NOTE  2025      PATIENT NAME: Dale Alas  :  1955  MRN:  [id number]  Date of Admission:  2025      Antimicrobials:  ceFAZolin 2000 mg IVPB in 100 mL NS  "(MBP)  linezolid - 600 MG      MAR reviewed.     Reason for consultation: Left leg cellulitis    Interval history: Still having a lot of pain, redness, and swelling of calf.  Knee is fine.  No fevers.  Tolerating abx.    ROS:  No fevers/chills overnight.  No new rashes.  No SOB or chest pain.  No nausea/vomiting/diarrhea.  Denies side effects from antimicrobials.     Objective:  Temp (24hrs), Av.4 °F (36.9 °C), Min:98 °F (36.7 °C), Max:99.1 °F (37.3 °C)    /100   Pulse 72   Temp 98.5 °F (36.9 °C) (Oral)   Resp 18   Ht 189.2 cm (74.49\")   Wt (!) 149 kg (328 lb 7.8 oz)   SpO2 98%   BMI 41.62 kg/m²     Physical Examination:  GENERAL: Stable appearing male.  Awake and alert, in no acute distress. Obese.  HEENT: Normocephalic, atraumatic.  LUNGS: Normal respiratory effort.  ABDOMEN: Soft, nontender, nondistended.  No rebound or guarding.  Obese  EXT:  +left leg edema - ongoing improvement around knee, calf still with tight edema  MSK: Good movement of left knee and ankle without pain.  SKIN: No lesions of the left foot.  Ongoing redness and warmth of the left ankle and posterior calf; better more superiorly near knee.  Scabbed lesions of the left knee and the left anterior shin are healing and stable.  No drainage.  NEURO: A&Ox4. No focal deficits.  Face symmetric.  Speech fluent.  Moves all extremities well.   PSYCHIATRIC: Normal insight and judgement.  Cooperative.  Normal affect.    Laboratory Data:    Results from last 7 days   Lab Units 06/15/25  2036 25  1158 25  0744   WBC 10*3/mm3 9.10 9.40 10.24  9.87   HEMOGLOBIN g/dL 12.8* 11.4* 11.9*  11.9*   HEMATOCRIT % 38.8 35.5* 36.2*  35.7*   PLATELETS 10*3/mm3 201 192 190  182     Results from last 7 days   Lab Units 25  1703   SODIUM mmol/L 136   POTASSIUM mmol/L 4.5   CHLORIDE mmol/L 102   CO2 mmol/L 24.0   BUN mg/dL 13.1   CREATININE mg/dL 1.10   GLUCOSE mg/dL 217*   CALCIUM mg/dL 8.9     Results from last 7 days   Lab Units " 06/14/25  1703   ALK PHOS U/L 84   BILIRUBIN mg/dL 0.3   ALT (SGPT) U/L 29   AST (SGOT) U/L 32         Results from last 7 days   Lab Units 06/12/25  0744   CRP mg/dL 6.09*     Estimated Creatinine Clearance: 100 mL/min (by C-G formula based on SCr of 1.1 mg/dL).      Results from last 7 days   Lab Units 06/14/25  1703   CK TOTAL U/L 90               Microbiology:    Microbiology Results (last 10 days)       Procedure Component Value - Date/Time    Wound Culture - Wound, Leg, Left [[id number]] Collected: 06/13/25 1153    Lab Status: Final result Specimen: Wound from Leg, Left Updated: 06/15/25 0803     Wound Culture Moderate growth (3+) Normal Skin Cortney     Gram Stain Few (2+) WBCs seen      Moderate (3+) Gram positive cocci in pairs    Blood Culture - Blood, Hand, Right [[id number]]  (Normal) Collected: 06/12/25 0750    Lab Status: Final result Specimen: Blood from Hand, Right Updated: 06/17/25 0815     Blood Culture No growth at 5 days    Narrative:      Less than seven (7) mL's of blood was collected.  Insufficient quantity may yield false negative results.    Blood Culture - Blood, Hand, Left [[id number]]  (Normal) Collected: 06/12/25 0744    Lab Status: Final result Specimen: Blood from Hand, Left Updated: 06/17/25 0815     Blood Culture No growth at 5 days    Narrative:      Less than seven (7) mL's of blood was collected.  Insufficient quantity may yield false negative results.    MRSA Screen, PCR (Inpatient) - Swab, Nares [[id number]]  (Normal) Collected: 06/12/25 0724    Lab Status: Final result Specimen: Swab from Nares Updated: 06/12/25 0858     MRSA PCR Negative    Narrative:      The negative predictive value of this diagnostic test is high and should only be used to consider de-escalating anti-MRSA therapy. A positive result may indicate colonization with MRSA and must be correlated clinically.  MRSA Negative              Radiology:  No radiology results for the last day          DISCUSSION:  69 y.o. male  with history of obesity, uncontrolled diabetes, and remote left knee arthroplasty admitted with left leg cellulitis.      PROBLEM LIST:   -- Left leg cellulitis.  Seems to have started after abrasion on the shin.  Small mild purulence able to be expressed from that wound.  Likely staphylococcal or streptococcal infection.  Wound culture with normal skin nathanael.  Risk to develop left knee periprosthetic joint infection but no effusion or pain.  No fluid collection on CT.  -- History of remote left knee arthroplasty, last in 2013 by Dr. Matias at [facility].  He reports he has had 3 total surgeries on his left knee, the first arthroplasty followed by 2 revisions due to mechanical issues and not infection.  Orthopedics evaluated and low suspicion for PJI as no pain or effusion; avoid aspiration with overlying cellulitis.  -- History of left lower leg orthopedic hardware, status post removal several years ago.  Details unknown.  -- History of left leg vein procedure.  Duplex this admission negative for DVT.  -- Atrial fibrillation, new onset.  Exacerbated by current infection.  Failed HELLEN, inability to pass probe.  -- Uncontrolled diabetes mellitus type 2.  Contributing to propensity for infection and poor wound healing.    PLAN:  -- Continue cefazolin 2 g iv q8h  -- Continue linezolid 600 mg po bid  -- Follow exam.  Slow improvement.  Still with a lot of erythema, pain, and swelling of calf - proceed with MRI to r/o myositis or abscess.  -- Low suspicion for PJI but continue to monitor knee.  -- Plan for oral linezolid at discharge, pending MRI results and ongoing improvement    Plan discussed with patient.    This visit included the following complex service elements:  Complex medical decision-making associated with antimicrobial prescribing.  In-depth chart review with high level synthesis for complex diagnoses.      Carlos Dumont MD  6/18/2025  14:09 EDT      Electronically signed by Carlos Dumont,  MD at 25 1412          Physical Therapy Notes (most recent note)        Kathy Cisneros, PT at 25 1032  Version 1 of 1         Patient Name: Dale Alas  : 1955    MRN: [id number]                              Today's Date: 2025       Admit Date: 2025    Visit Dx: No diagnosis found.  Patient Active Problem List   Diagnosis    Encounter for screening for malignant neoplasm of colon    Atrial fibrillation    Primary hypertension    Prediabetes    Other hyperlipidemia    RLS (restless legs syndrome)    Cellulitis of left lower extremity     Past Medical History:   Diagnosis Date    Arthritis     Diabetes     Hypercholesteremia     MI (myocardial infarction)     Sleep apnea     CPAP    Wears glasses      Past Surgical History:   Procedure Laterality Date    COLONOSCOPY      over 10 years ago    GALLBLADDER SURGERY N/A     REPLACEMENT TOTAL KNEE Left       General Information       Row Name 25 1426          Physical Therapy Time and Intention    Document Type therapy note (daily note)  -KG     Mode of Treatment physical therapy  -KG       Row Name 25 1426          General Information    Existing Precautions/Restrictions fall;other (see comments)  L LE cellulitis; painful L LE; minimal weight bearing on L LE  -KG       Row Name 25 1426          Cognition    Orientation Status (Cognition) oriented x 4  -KG       Row Name 25 1426          Safety Issues/Impairments Affecting Functional Mobility    Safety Issues Affecting Function (Mobility) awareness of need for assistance;insight into deficits/self-awareness;safety precaution awareness;safety precautions follow-through/compliance  -KG     Impairments Affecting Function (Mobility) balance;coordination;endurance/activity tolerance;pain;postural/trunk control;strength  -KG               User Key  (r) = Recorded By, (t) = Taken By, (c) = Cosigned By      Initials Name Provider Type    KG Blayne  Kathy NOONAN, PT Physical Therapist                   Mobility       Row Name 06/18/25 1427          Bed Mobility    Bed Mobility supine-sit;sit-supine  -KG     Supine-Sit [place] (Bed Mobility) standby assist;verbal cues  -KG     Sit-Supine [place] (Bed Mobility) standby assist;verbal cues  -KG     Assistive Device (Bed Mobility) bed rails;head of bed elevated  -KG     Comment, (Bed Mobility) VC's for sequencing. Pt did not require any physical assistance.  -KG       Row Name 06/18/25 1427          Transfers    Comment, (Transfers) VC's for sequencing and safe hand placement. Pt very unsteady upon initial stand and returned to seated position at EOB. Upon second stand pt continued to demonstrate minimal weight bearing on L LE with very forward flexed posture. Required cues to correct.  -KG       Row Name 06/18/25 1427          Sit-Stand Transfer    Sit-Stand [place] (Transfers) minimum assist (75% patient effort);verbal cues  -KG     Assistive Device (Sit-Stand Transfers) walker, front-wheeled  -KG       Row Name 06/18/25 1427          Gait/Stairs (Locomotion)    [place] Level (Gait) minimum assist (75% patient effort);verbal cues  -KG     Assistive Device (Gait) walker, front-wheeled  -KG     Distance in Feet (Gait) 70  -KG     Deviations/Abnormal Patterns (Gait) left sided deviations;base of support, wide;robi decreased;stride length decreased  -KG     Bilateral Gait Deviations forward flexed posture  -KG     Left Sided Gait Deviations weight shift ability decreased  -KG     Comment, (Gait/Stairs) Pt demonstrated step through gait pattern with slow robi and wide DARRYN. Pt with very forward flexed posture and tendency to lean forward onto RW for increased weight bearing through B UEs. Consistent cues required to correct. Pt with minimal weight bearing on L LE throughout ambulation. Required one standing rest break. Ambulation distance limited by L LE pain.  -KG               User Key  (r) =  Recorded By, (t) = Taken By, (c) = Cosigned By      Initials Name Provider Type    KG Kathy Cisneros, PT Physical Therapist                   Obj/Interventions       Row Name 06/18/25 1430          Balance    Balance Assessment sitting static balance;standing static balance;standing dynamic balance  -KG     Static Sitting Balance standby assist  -KG     Position, Sitting Balance unsupported;sitting edge of bed  -KG     Static Standing Balance contact guard  -KG     Dynamic Standing Balance minimal assist  -KG     Position/Device Used, Standing Balance supported;walker, rolling  -KG               User Key  (r) = Recorded By, (t) = Taken By, (c) = Cosigned By      Initials Name Provider Type    KG Kathy Cisneros, PT Physical Therapist                   Goals/Plan    No documentation.                  Clinical Impression       Row Name 06/18/25 1431          Pain    Pretreatment Pain Rating 3/10  -KG     Posttreatment Pain Rating 8/10  -KG     Pain Location extremity  -KG     Pain Side/Orientation left;lower  -KG     Pain Management Interventions exercise or physical activity utilized  -KG     Response to Pain Interventions activity participation with increased pain  -KG       Row Name 06/18/25 1431          Plan of Care Review    Plan of Care Reviewed With patient  -KG     Progress declining  -KG     Outcome Evaluation Pt required Shruthi for STS transfers and ambulated 70ft with RW and Shruthi 1+1. Pt very unsteady at times with wide DARRYN and very forward flexed posture. Minimal weight bearing on L LE throughout ambulation. Required consistent cues for proper hand placement and management of RW. Required one standing rest break. Ambulation distance limited by c/o increased L LE pain. Continue to recommend PT skilled care and D/C to IP rehab facility.  -KG       Row Name 06/18/25 1431          Vital Signs    Pre Systolic BP Rehab 164  -KG     Pre Treatment Diastolic BP 86  -KG     Pretreatment Heart Rate  (beats/min) 71  -KG     Posttreatment Heart Rate (beats/min) 68  -KG     Pre SpO2 (%) 97  -KG     O2 Delivery Pre Treatment room air  -KG     Post SpO2 (%) 97  -KG     O2 Delivery Post Treatment room air  -KG     Pre Patient Position Supine  -KG     Intra Patient Position Standing  -KG     Post Patient Position Supine  -KG       Row Name 06/18/25 1431          Positioning and Restraints    Pre-Treatment Position in bed  -KG     Post Treatment Position bed  -KG     In Bed notified nsg;supine;call light within reach;encouraged to call for assist;exit alarm on;side rails up x2  -KG               User Key  (r) = Recorded By, (t) = Taken By, (c) = Cosigned By      Initials Name Provider Type    Kathy Bahena, PT Physical Therapist                   Outcome Measures       Row Name 06/18/25 1433 06/18/25 0732       How much help from another person do you currently need...    Turning from your back to your side while in flat bed without using bedrails? 3  -KG 4  -CM    Moving from lying on back to sitting on the side of a flat bed without bedrails? 3  -KG 4  -CM    Moving to and from a bed to a chair (including a wheelchair)? 3  -KG 4  -CM    Standing up from a chair using your arms (e.g., wheelchair, bedside chair)? 3  -KG 4  -CM    Climbing 3-5 steps with a railing? 2  -KG 4  -CM    To walk in hospital room? 3  -KG 4  -CM    AM-PAC 6 Clicks Score (PT) 17  -KG 24  -CM    Highest Level of Mobility Goal Stand (1 or More Minutes)-5  -KG Walk 250 Feet or More - 8  -CM      Row Name 06/18/25 1433 06/18/25 1153       Functional Assessment    Outcome Measure Options AM-PAC 6 Clicks Basic Mobility (PT)  -KG AM-PAC 6 Clicks Daily Activity (OT)  -AN              User Key  (r) = Recorded By, (t) = Taken By, (c) = Cosigned By      Initials Name Provider Type    Kathy Bahena, PT Physical Therapist    Lydia Smith, OT Occupational Therapist    Velvet Carroll, RN Registered Nurse                                  Physical Therapy Education       Title: PT OT SLP Therapies (In Progress)       Topic: Physical Therapy (In Progress)       Point: Mobility training (In Progress)       Learning Progress Summary            Patient Acceptance, E, NR by KG at 6/18/2025 1032    Acceptance, E, VU by EC at 6/17/2025 1316    Acceptance, E, NR by SUNSHINE at 6/13/2025 1337                      Point: Home exercise program (In Progress)       Learning Progress Summary            Patient Acceptance, E, NR by KG at 6/18/2025 1032    Acceptance, E, VU by EC at 6/17/2025 1316    Acceptance, E, NR by SUNSHINE at 6/13/2025 1337                      Point: Body mechanics (In Progress)       Learning Progress Summary            Patient Acceptance, E, NR by KG at 6/18/2025 1032    Acceptance, E, VU by EC at 6/17/2025 1316    Acceptance, E, NR by SUNSHINE at 6/13/2025 1337                      Point: Precautions (In Progress)       Learning Progress Summary            Patient Acceptance, E, NR by KG at 6/18/2025 1032    Acceptance, E, VU by EC at 6/17/2025 1316    Acceptance, E, NR by SUNSHINE at 6/13/2025 1337                                      User Key       Initials Effective Dates Name Provider Type Discipline    SUNSHINE 02/03/23 -  Marissa Costa, PT Physical Therapist PT    KG 05/22/20 -  Kathy Cisneros, PT Physical Therapist PT    EC 04/03/25 -  Milagro Haddad, RN Registered Nurse Nurse                  PT Recommendation and Plan     Progress: declining  Outcome Evaluation: Pt required Shruthi for STS transfers and ambulated 70ft with RW and Shruthi 1+1. Pt very unsteady at times with wide DARRYN and very forward flexed posture. Minimal weight bearing on L LE throughout ambulation. Required consistent cues for proper hand placement and management of RW. Required one standing rest break. Ambulation distance limited by c/o increased L LE pain. Continue to recommend PT skilled care and D/C to IP rehab facility.     Time Calculation:         PT Charges       Row  Name 25 1032             Time Calculation    Start Time 1032  -KG      PT Received On 25  -KG      PT Goal Re-Cert Due Date 25  -KG         Time Calculation- PT    Total Timed Code Minutes- PT 12 minute(s)  -KG         Timed Charges    44720 - PT Therapeutic Activity Minutes 12  -KG         Total Minutes    Timed Charges Total Minutes 12  -KG       Total Minutes 12  -KG                User Key  (r) = Recorded By, (t) = Taken By, (c) = Cosigned By      Initials Name Provider Type    KG Kathy Cisneros, PT Physical Therapist                  Therapy Charges for Today       Code Description Service Date Service Provider Modifiers Qty    89348888968 HC PT THERAPEUTIC ACT EA 15 MIN 2025 Kathy Cisneros, PT GP 1            PT G-Codes  Outcome Measure Options: AM-PAC 6 Clicks Basic Mobility (PT)  AM-PAC 6 Clicks Score (PT): 17  AM-PAC 6 Clicks Score (OT): 16  PT Discharge Summary  Anticipated Discharge Disposition (PT): inpatient rehabilitation facility    Rosanna Cisneros PT  2025      Electronically signed by Kathy Cisneros, PT at 25 1434          Occupational Therapy Notes (most recent note)        Lydia Sanchez, OT at 25 1045          Patient Name: Dale Alas  : 1955    MRN: [id number]                              Today's Date: 2025       Admit Date: 2025    Visit Dx: No diagnosis found.  Patient Active Problem List   Diagnosis    Encounter for screening for malignant neoplasm of colon    Atrial fibrillation    Primary hypertension    Prediabetes    Other hyperlipidemia    RLS (restless legs syndrome)    Cellulitis of left lower extremity     Past Medical History:   Diagnosis Date    Arthritis     Diabetes     Hypercholesteremia     MI (myocardial infarction) 2015    Sleep apnea     CPAP    Wears glasses      Past Surgical History:   Procedure Laterality Date    COLONOSCOPY      over 10 years ago    GALLBLADDER SURGERY N/A      REPLACEMENT TOTAL KNEE Left 2013      General Information       Row Name 06/18/25 1148          OT Time and Intention    Mode of Treatment occupational therapy  -AN       Row Name 06/18/25 1148          General Information    Patient Profile Reviewed yes  -AN     Existing Precautions/Restrictions fall  -AN     Barriers to Rehab medically complex  -AN       Row Name 06/18/25 1148          Cognition    Orientation Status (Cognition) oriented x 4  -AN       Row Name 06/18/25 1148          Safety Issues/Impairments Affecting Functional Mobility    Safety Issues Affecting Function (Mobility) safety precaution awareness;safety precautions follow-through/compliance;insight into deficits/self-awareness;positioning of assistive device  -AN     Impairments Affecting Function (Mobility) balance;endurance/activity tolerance;strength  -AN               User Key  (r) = Recorded By, (t) = Taken By, (c) = Cosigned By      Initials Name Provider Type    AN Lydia Sanchez OT Occupational Therapist                     Mobility/ADL's       [facility] Name 06/18/25 1148          Bed Mobility    Bed Mobility supine-sit-supine  -AN     Supine-Sit-Supine Sierra (Bed Mobility) standby assist  -AN     Assistive Device (Bed Mobility) head of bed elevated  -AN       Row Name 06/18/25 1148          Transfers    Transfers sit-stand transfer;stand-sit transfer  -AN       Row Name 06/18/25 1148          Sit-Stand Transfer    Sit-Stand Sierra (Transfers) contact guard  -AN     Assistive Device (Sit-Stand Transfers) walker, front-wheeled  -AN       [facility] Name 06/18/25 1148          Stand-Sit Transfer    Stand-Sit Sierra (Transfers) contact guard  -AN     Assistive Device (Stand-Sit Transfers) walker, front-wheeled  -AN       Row Name 06/18/25 1148          Functional Mobility    Functional Mobility- Ind. Level unable to perform  -AN     Functional Mobility- Comment limited by LLE pain this session  -AN               User Key  (r) =  Recorded By, (t) = Taken By, (c) = Cosigned By      Initials Name Provider Type    Lydia Smith OT Occupational Therapist                   Obj/Interventions       Row Name 06/18/25 1149          Balance    Balance Assessment sitting static balance;sitting dynamic balance;sit to stand dynamic balance;standing static balance;standing dynamic balance  -AN     Static Sitting Balance standby assist  -AN     Dynamic Sitting Balance standby assist  -AN     Position, Sitting Balance sitting edge of bed  -AN     Sit to Stand Dynamic Balance verbal cues;contact guard  -AN     Static Standing Balance contact guard  -AN     Dynamic Standing Balance minimal assist;1-person assist  -AN     Position/Device Used, Standing Balance walker, rolling  -AN     Balance Interventions standing;sit to stand;supported;static;dynamic;minimal challenge;occupation based/functional task  -AN       Row Name 06/18/25 1149          Hip (Therapeutic Exercise)    Hip (Therapeutic Exercise) AAROM (active assistive range of motion)  -AN     Hip AAROM (Therapeutic Exercise) left;flexion;extension;supine;10 repetitions  -AN       Row Name 06/18/25 1149          Knee (Therapeutic Exercise)    Knee (Therapeutic Exercise) AAROM (active assistive range of motion)  -AN     Knee AAROM (Therapeutic Exercise) left;flexion;extension;supine;10 repetitions  -AN       Row Name 06/18/25 1149          Ankle (Therapeutic Exercise)    Ankle (Therapeutic Exercise) AAROM (active assistive range of motion)  -AN     Ankle AAROM (Therapeutic Exercise) left;dorsiflexion;plantarflexion;supine;10 repetitions  -AN       Row Name 06/18/25 1149          OTHER    Stretching (Therapeutic Exercise) other (see comments)  calf stretches performed in supine and in standing with use of RW to reduce tightness and pain  -AN               User Key  (r) = Recorded By, (t) = Taken By, (c) = Cosigned By      Initials Name Provider Type    Lydia Smith OT Occupational  Therapist                   Goals/Plan    No documentation.                  Clinical Impression       Row Name 06/18/25 1151          Pain Assessment    Pretreatment Pain Rating 7/10  -AN     Posttreatment Pain Rating 7/10  -AN     Pain Location extremity  -AN     Pain Side/Orientation left  -AN     Pain Management Interventions activity modification encouraged;cold applied  -AN     Response to Pain Interventions activity participation with tolerable pain  -AN       Row Name 06/18/25 1151          Plan of Care Review    Plan of Care Reviewed With patient  -AN     Progress declining  -AN     Outcome Evaluation Pt required increased assistance with functional transfers and limited mobility due to LLE pain and tightness. Encouraged pt to continue to perform LLE ther-ex daily and mobilize as able with RN staff. Cont POC.  -AN       Row Name 06/18/25 1151          Therapy Plan Review/Discharge Plan (OT)    Anticipated Discharge Disposition (OT) inpatient rehabilitation facility  -AN       Row Name 06/18/25 1151          Positioning and Restraints    Pre-Treatment Position in bed  -AN     Post Treatment Position bed  -AN     In Bed notified nsg;supine;call light within reach;encouraged to call for assist;patient within staff view;exit alarm on;side rails up x2  -AN               User Key  (r) = Recorded By, (t) = Taken By, (c) = Cosigned By      Initials Name Provider Type    Lydia Smith, ALEJANDRA Occupational Therapist                   Outcome Measures       Row Name 06/18/25 1153          How much help from another is currently needed...    Putting on and taking off regular lower body clothing? 2  -AN     Bathing (including washing, rinsing, and drying) 2  -AN     Toileting (which includes using toilet bed pan or urinal) 2  -AN     Putting on and taking off regular upper body clothing 3  -AN     Taking care of personal grooming (such as brushing teeth) 3  -AN     Eating meals 4  -AN     AM-PAC 6 Clicks Score (OT)  16  -AN       Row Name 06/18/25 0732          How much help from another person do you currently need...    Turning from your back to your side while in flat bed without using bedrails? 4  -CM     Moving from lying on back to sitting on the side of a flat bed without bedrails? 4  -CM     Moving to and from a bed to a chair (including a wheelchair)? 4  -CM     Standing up from a chair using your arms (e.g., wheelchair, bedside chair)? 4  -CM     Climbing 3-5 steps with a railing? 4  -CM     To walk in hospital room? 4  -CM     AM-PAC 6 Clicks Score (PT) 24  -CM       Row Name 06/18/25 1153          Functional Assessment    Outcome Measure Options AM-PAC 6 Clicks Daily Activity (OT)  -AN               User Key  (r) = Recorded By, (t) = Taken By, (c) = Cosigned By      Initials Name Provider Type    Lydia Smith OT Occupational Therapist    Velvet Carroll, RN Registered Nurse                    Occupational Therapy Education       Title: PT OT SLP Therapies (Done)       Topic: Occupational Therapy (Done)       Point: ADL training (Done)       Learning Progress Summary            Patient Acceptance, E, VU by AN at 6/18/2025 1154                      Point: Home exercise program (Done)       Learning Progress Summary            Patient Acceptance, E, VU by AN at 6/18/2025 1154                      Point: Precautions (Done)       Learning Progress Summary            Patient Acceptance, E, VU by AN at 6/18/2025 1154                      Point: Body mechanics (Done)       Learning Progress Summary            Patient Acceptance, E, VU by AN at 6/18/2025 1154                                      User Key       Initials Effective Dates Name Provider Type Discipline    LISA 09/21/21 -  Lydia Sanchez OT Occupational Therapist OT                  OT Recommendation and Plan     Plan of Care Review  Plan of Care Reviewed With: patient  Progress: declining  Outcome Evaluation: Pt required increased assistance with  functional transfers and limited mobility due to LLE pain and tightness. Encouraged pt to continue to perform LLE ther-ex daily and mobilize as able with RN staff. Cont POC.     Time Calculation:         Time Calculation- OT       Row Name 06/18/25 1154             Time Calculation- OT    OT Start Time 1155  -AN      OT Received On 06/18/25  -AN         Timed Charges    88999 - OT Therapeutic Exercise Minutes 15  -AN      40081 - OT Therapeutic Activity Minutes 8  -AN         Total Minutes    Timed Charges Total Minutes 23  -AN       Total Minutes 23  -AN                User Key  (r) = Recorded By, (t) = Taken By, (c) = Cosigned By      Initials Name Provider Type    AN Lydia Sanchez OT Occupational Therapist                  Therapy Charges for Today       Code Description Service Date Service Provider Modifiers Qty    [id number]  OT THER PROC EA 15 MIN 6/18/2025 Lydia Sanchez OT GO 1    [id number]  OT THERAPEUTIC ACT EA 15 MIN 6/18/2025 Lydia Sanchez OT GO 1                 Lydia Sanchez OT  6/18/2025    Electronically signed by Lydia Sanchez OT at 06/18/25 1155

## 2025-06-19 NOTE — CASE MANAGEMENT/SOCIAL WORK
Continued Stay Note  KENNEDY Sparks     Patient Name: Dale Alas  MRN: [id number]  Today's Date: 6/19/2025    Admit Date: 6/12/2025    Plan: rehab   Discharge Plan       Row Name 06/19/25 0756       Plan    Plan rehab    Patient/Family in Agreement with Plan yes    Plan Comments I met with this patient regarding therapy recommending rehab. He is in agreement, and he asked the CM to make referrals to [facility] and [facility], which I did. His son can transport. CM to follow.    Final Discharge Disposition Code 03 - skilled nursing facility (SNF)                   Discharge Codes    No documentation.                 Expected Discharge Date and Time       Expected Discharge Date Expected Discharge Time    Jun 18, 2025               Jeanette Langley RN

## 2025-06-20 NOTE — OUTREACH NOTE
Prep Survey      Flowsheet Row Responses   [facility] facility patient discharged from? [place]   Is LACE score < 7 ? No   Eligibility Readm Mgmt   Discharge diagnosis Atrial fibrillation   Does the patient have one of the following disease processes/diagnoses(primary or secondary)? Other   Does the patient have Home health ordered? No   Is there a DME ordered? No   Prep survey completed? Yes            Jayshree CARLOS - Registered Nurse

## 2025-06-25 ENCOUNTER — READMISSION MANAGEMENT (OUTPATIENT)
Dept: CALL CENTER | Facility: HOSPITAL | Age: 70
End: 2025-06-25
Payer: MEDICARE

## 2025-06-25 NOTE — OUTREACH NOTE
Medical Week 1 Survey      Flowsheet Row Responses   [facility] patient discharged from? [place]   Does the patient have one of the following disease processes/diagnoses(primary or secondary)? Other   Week 1 attempt successful? No   Unsuccessful attempts Attempt 1            JOHN SIERRA - Registered Nurse

## 2025-07-01 ENCOUNTER — READMISSION MANAGEMENT (OUTPATIENT)
Dept: CALL CENTER | Facility: HOSPITAL | Age: 70
End: 2025-07-01
Payer: MEDICARE

## 2025-07-01 NOTE — OUTREACH NOTE
Medical Week 2 Survey      Flowsheet Row Responses   [facility] patient discharged from? [place]   Does the patient have one of the following disease processes/diagnoses(primary or secondary)? Other   Week 2 attempt successful? No   Unsuccessful attempts Attempt 1            Lacie CARLOS - Registered Nurse

## 2025-07-09 ENCOUNTER — READMISSION MANAGEMENT (OUTPATIENT)
Dept: CALL CENTER | Facility: HOSPITAL | Age: 70
End: 2025-07-09
Payer: MEDICARE

## 2025-07-09 NOTE — OUTREACH NOTE
Medical Week 3 Survey      Flowsheet Row Responses   [facility] patient discharged from? [place]   Does the patient have one of the following disease processes/diagnoses(primary or secondary)? Other   Week 3 attempt successful? Yes   Call start time 1550   Call end time 1555   Discharge diagnosis Atrial fibrillation   Meds reviewed with patient/caregiver? Yes   Is the patient having any side effects they believe may be caused by any medication additions or changes? No   Is the patient taking all medications as directed (includes completed medication regime)? Yes   Medication comments started a new abx last week per PCP   Does the patient have a primary care provider?  Yes   Does the patient have an appointment with their PCP within 7 days of discharge? Yes   Has the patient kept scheduled appointments due by today? Yes   Has home health visited the patient within 72 hours of discharge? N/A   Home health comments outpatient PT twice a week   DME comments pt uses a cane for balance   Psychosocial issues? No   What is the patient's perception of their health status since discharge? Improving  [pt reports has some light headedness, LLE still some swelling, however has decreased, PCP stated LLE looks like could still have infection so pt was started on another abx]   Is the patient/caregiver able to teach back the hierarchy of who to call/visit for symptoms/problems? PCP, Specialist, Home health nurse, Urgent Care, ED, 911 Yes   Week 3 Call Completed? Yes   Graduated Yes   Is the patient interested in additional calls from an ambulatory ? No   Would this patient benefit from a Referral to Amb Social Work? No   Call end time 1555            Nicky CARLOS - Registered Nurse

## 2025-07-28 PROBLEM — E78.5 DYSLIPIDEMIA: Status: ACTIVE | Noted: 2025-07-28

## 2025-07-28 NOTE — PROGRESS NOTES
"[facility] Cardiology    Encounter Date: 2025    Patient ID: Dale Alas is a 70 y.o. male.  : 1955     PCP: Guillermo Enciso MD       Chief Complaint: Atrial Fibrillation (F/U)      PROBLEM LIST:  Atrial fibrillation  HELLEN, 2025: Attempted and was unsuccessful due to failure to advance the probe.   Echo, 06/15/2025: EF 69.8%. Mild concentric hypertrophy. Borderline increase LAV and borderline dilated LAC. Trace MR. Trace TR.   LLE edema  Venous duplex, 2025: S/p bilateral endovascular treatment of greater saphenous veins, which are occluded and not well visualized. Otherwise normal bilateral LLE.   Hypertension  Hyperlipidemia  Restless leg syndrome  Sleep apnea   Prediabetic     History of Present Illness  Patient presents today for a follow-up with a history of atrial fibrillation and cardiac risk factors. Since last visit, patient was admitted to the hospital with cellulitis and found with new onset atrial fibrillation with RVR.  HELLEN ECV was attempted but due to inability to advance the probe, procedure was canceled.  Patient ultimately converted to sinus rhythm, which he is maintaining today.  States that he has been feeling better.  Has completed his antibiotic for cellulitis.  Was able to afford Eliquis and has not been on anticoagulation.    Allergies   Allergen Reactions    Other Hives     \"Some kind of shot\" cannot recall name         Current Outpatient Medications:     Ascorbic Acid (VITAMIN C ER PO), Take  by mouth Daily. OTC, Disp: , Rfl:     carbidopa-levodopa (SINEMET)  MG per tablet, Take 1 tablet by mouth every night at bedtime., Disp: , Rfl:     ciprofloxacin (CIPRO) 750 MG tablet, Take 1 tablet by mouth Every 12 (Twelve) Hours., Disp: , Rfl:     Cyanocobalamin (VITAMIN B-12 PO), Take 1 tablet by mouth 2 (Two) Times a Day., Disp: , Rfl:     empagliflozin (JARDIANCE) 10 MG tablet tablet, Take 1 tablet by mouth Daily., Disp: 30 " tablet, Rfl: 5    finasteride (PROSCAR) 5 MG tablet, Take 1 tablet by mouth Daily., Disp: , Rfl:     folic acid (FOLVITE) 1 MG tablet, Take 1 tablet by mouth Daily., Disp: , Rfl:     Insulin Glargine, 2 Unit Dial, (Toujeo Max SoloStar) 300 UNIT/ML solution pen-injector injection, Inject 20 Units under the skin into the appropriate area as directed Daily., Disp: , Rfl:     lisinopril (PRINIVIL,ZESTRIL) 30 MG tablet, Take 1 tablet by mouth Every 12 (Twelve) Hours., Disp: , Rfl:     Melatonin 10 MG tablet, Take 1 tablet by mouth Daily., Disp: , Rfl:     metFORMIN ER (GLUCOPHAGE-XR) 500 MG 24 hr tablet, Take 1 tablet by mouth 2 (Two) Times a Day With Meals., Disp: , Rfl:     metoprolol tartrate (LOPRESSOR) 100 MG tablet, Take 1 tablet by mouth 2 (Two) Times a Day., Disp: 60 tablet, Rfl: 5    niacin (NIASPAN) 1000 MG CR tablet, Take 1 tablet by mouth 2 (Two) Times a Day., Disp: , Rfl:     POTASSIUM BICARBONATE PO, Take  by mouth Daily. OTC, Disp: , Rfl:     pravastatin (PRAVACHOL) 80 MG tablet, Take 1 tablet by mouth every night at bedtime., Disp: , Rfl:     tamsulosin (FLOMAX) 0.4 MG capsule 24 hr capsule, Take 1 capsule by mouth Daily., Disp: , Rfl:     TART CHERRY PO, Take 1 tablet by mouth Daily., Disp: , Rfl:     Tirzepatide (Mounjaro) 2.5 MG/0.5ML solution pen-injector pen, Inject 0.5 mL under the skin into the appropriate area as directed 1 (One) Time Per Week., Disp: , Rfl:     VITAMIN D PO, Take  by mouth Daily., Disp: , Rfl:     amLODIPine (NORVASC) 5 MG tablet, Take 1 tablet by mouth Daily., Disp: 90 tablet, Rfl: 3    rivaroxaban (XARELTO) 20 MG tablet, Take 1 tablet by mouth Daily., Disp: 90 tablet, Rfl: 3    The following portions of the patient's history were reviewed and updated as appropriate: allergies, current medications, past family history, past medical history, past social history, past surgical history and problem list.    ROS  Review of Systems   14 point ROS negative except for that listed in the  "HPI.         Objective:     /78 (BP Location: Right arm, Patient Position: Sitting, Cuff Size: Adult)   Pulse 72   Ht 182.9 cm (72\")   Wt (!) 142 kg (314 lb)   SpO2 97%   BMI 42.59 kg/m²      Physical Exam  Constitutional: Patient appears well-developed and well-nourished. .   Neck: Neck supple. No JVD present.   Cardiovascular: Normal rate, regular rhythm and normal heart sounds. No murmur heard.   2+ symmetric pulses.   Pulmonary/Chest: Breath sounds normal. Does not exhibit tenderness.   Musculoskeletal: LLE edema present. Erythema present. Cellulitis  Vitals reviewed.    Data Review:   Lab Results   Component Value Date    GLUCOSE 217 (H) 06/14/2025    BUN 13.1 06/14/2025    CREATININE 1.10 06/14/2025    EGFR 73.1 06/14/2025    BCR 11.9 06/14/2025     06/14/2025    K 4.5 06/14/2025    CO2 24.0 06/14/2025    CALCIUM 8.9 06/14/2025    ALBUMIN 3.2 (L) 06/14/2025    AST 32 06/14/2025    ALT 29 06/14/2025     Lab Results   Component Value Date    WBC 9.10 06/15/2025    RBC 4.58 06/15/2025    HGB 12.8 (L) 06/15/2025    HCT 38.8 06/15/2025    MCV 84.7 06/15/2025     06/15/2025     Lab Results   Component Value Date    HGBA1C 8.90 (H) 06/12/2025        Procedures       Advance Care Planning   ACP discussion was held with the patient during this visit. Patient does not have an advance directive, declines further assistance.           Assessment and plan:      Diagnosis Plan   1. Atrial fibrillation, unspecified type        2. Primary hypertension        3. Dyslipidemia          Continue Lopressor 100 mg twice daily for heart rate control.  Patient has not been on Eliquis due to financial constraints.  [id number] score 4.  Xarelto samples given today.  Prescription sent in.  Advised that if he is unable to afford Xarelto he should call our office for further recommendation on anticoagulation.  Extensive conversation regarding risk of stroke while off anticoagulation  Add amlodipine 5 mg daily for " better blood pressure control.  Continue pravastatin 80 mg daily for dyslipidemia.  Heart healthy diet and exercise.  Better control of diabetes.  Patient assistance for Jardiance being completed by PCP.  Advised to follow-up with PCP regarding left lower extremity due to concern for recurrent cellulitis.  Continue current medications.   FU in 3 MO, sooner as needed.  Thank you for allowing us to participate in the care of your patient.       Tatyana nAdrea PA-C      Please note that portions of this note may have been completed with a voice recognition program. Efforts were made to edit the dictations, but occasionally words are mistranscribed.

## 2025-08-01 ENCOUNTER — OFFICE VISIT (OUTPATIENT)
Dept: CARDIOLOGY | Facility: CLINIC | Age: 70
End: 2025-08-01
Payer: MEDICARE

## 2025-08-01 ENCOUNTER — TELEPHONE (OUTPATIENT)
Dept: CARDIOLOGY | Facility: CLINIC | Age: 70
End: 2025-08-01
Payer: MEDICARE

## 2025-08-01 VITALS
WEIGHT: 314 LBS | SYSTOLIC BLOOD PRESSURE: 160 MMHG | HEART RATE: 72 BPM | DIASTOLIC BLOOD PRESSURE: 78 MMHG | BODY MASS INDEX: 42.53 KG/M2 | OXYGEN SATURATION: 97 % | HEIGHT: 72 IN

## 2025-08-01 DIAGNOSIS — E78.5 DYSLIPIDEMIA: ICD-10-CM

## 2025-08-01 DIAGNOSIS — I48.91 ATRIAL FIBRILLATION, UNSPECIFIED TYPE: Primary | ICD-10-CM

## 2025-08-01 DIAGNOSIS — I10 PRIMARY HYPERTENSION: ICD-10-CM

## 2025-08-01 RX ORDER — CIPROFLOXACIN 750 MG/1
1 TABLET, FILM COATED ORAL EVERY 12 HOURS SCHEDULED
COMMUNITY
Start: 2025-07-02

## 2025-08-01 RX ORDER — MELOXICAM 15 MG/1
15 TABLET ORAL DAILY
COMMUNITY
Start: 2025-07-24 | End: 2025-08-01

## 2025-08-01 RX ORDER — LISINOPRIL 30 MG/1
1 TABLET ORAL EVERY 12 HOURS SCHEDULED
COMMUNITY
Start: 2025-06-06

## 2025-08-01 RX ORDER — AMLODIPINE BESYLATE 5 MG/1
5 TABLET ORAL DAILY
Qty: 90 TABLET | Refills: 3 | Status: SHIPPED | OUTPATIENT
Start: 2025-08-01

## 2025-08-01 NOTE — TELEPHONE ENCOUNTER
Received verbal order from Tatyana Andrea PA-C in clinic to give pt Xarelto 20 mg samples. 4 bottles of 10 mg samples were given and discussed with pt taking 2 pills daily for total of 20 mg. Advised I was giving him a 2 week supply. He voiced understanding.